# Patient Record
Sex: FEMALE | Race: WHITE | NOT HISPANIC OR LATINO | Employment: OTHER | URBAN - METROPOLITAN AREA
[De-identification: names, ages, dates, MRNs, and addresses within clinical notes are randomized per-mention and may not be internally consistent; named-entity substitution may affect disease eponyms.]

---

## 2017-01-20 DIAGNOSIS — I10 BENIGN ESSENTIAL HYPERTENSION: Primary | ICD-10-CM

## 2017-01-20 DIAGNOSIS — E78.1 HIGH TRIGLYCERIDES: ICD-10-CM

## 2017-01-20 RX ORDER — ROSUVASTATIN CALCIUM 10 MG/1
10 TABLET, COATED ORAL DAILY
Qty: 1 TABLET | Refills: 0 | Status: SHIPPED | OUTPATIENT
Start: 2017-01-20 | End: 2017-12-12 | Stop reason: DRUGHIGH

## 2017-01-20 RX ORDER — HYDROCHLOROTHIAZIDE 25 MG/1
25 TABLET ORAL EVERY MORNING
Qty: 1 TABLET | Refills: 0 | Status: SHIPPED | OUTPATIENT
Start: 2017-01-20 | End: 2017-10-16

## 2017-04-01 DIAGNOSIS — I10 BENIGN ESSENTIAL HYPERTENSION: ICD-10-CM

## 2017-04-03 RX ORDER — HYDROCHLOROTHIAZIDE 25 MG/1
TABLET ORAL
Qty: 90 TABLET | Refills: 1 | Status: SHIPPED | OUTPATIENT
Start: 2017-04-03 | End: 2017-10-13

## 2017-04-03 RX ORDER — LISINOPRIL 10 MG/1
TABLET ORAL
Qty: 90 TABLET | Refills: 1 | Status: SHIPPED | OUTPATIENT
Start: 2017-04-03 | End: 2017-10-13

## 2017-06-08 ENCOUNTER — OFFICE VISIT (OUTPATIENT)
Dept: FAMILY MEDICINE | Facility: CLINIC | Age: 67
End: 2017-06-08
Payer: MEDICARE

## 2017-06-08 VITALS
WEIGHT: 180 LBS | DIASTOLIC BLOOD PRESSURE: 61 MMHG | SYSTOLIC BLOOD PRESSURE: 115 MMHG | HEART RATE: 78 BPM | TEMPERATURE: 97 F | BODY MASS INDEX: 31.39 KG/M2 | OXYGEN SATURATION: 97 %

## 2017-06-08 DIAGNOSIS — E78.00 HIGH BLOOD CHOLESTEROL: ICD-10-CM

## 2017-06-08 DIAGNOSIS — I10 BENIGN ESSENTIAL HYPERTENSION: ICD-10-CM

## 2017-06-08 DIAGNOSIS — F33.0 MAJOR DEPRESSIVE DISORDER, RECURRENT EPISODE, MILD (H): ICD-10-CM

## 2017-06-08 DIAGNOSIS — F41.1 ANXIETY STATE: ICD-10-CM

## 2017-06-08 DIAGNOSIS — K92.1 BLOOD IN STOOL: Primary | ICD-10-CM

## 2017-06-08 DIAGNOSIS — E78.1 HIGH TRIGLYCERIDES: ICD-10-CM

## 2017-06-08 DIAGNOSIS — E03.9 HYPOTHYROIDISM, UNSPECIFIED TYPE: ICD-10-CM

## 2017-06-08 LAB
BASOPHILS # BLD AUTO: 0 10E9/L (ref 0–0.2)
BASOPHILS NFR BLD AUTO: 0.2 %
DIFFERENTIAL METHOD BLD: ABNORMAL
EOSINOPHIL # BLD AUTO: 0.2 10E9/L (ref 0–0.7)
EOSINOPHIL NFR BLD AUTO: 1.7 %
ERYTHROCYTE [DISTWIDTH] IN BLOOD BY AUTOMATED COUNT: 14.9 % (ref 10–15)
HCT VFR BLD AUTO: 40.2 % (ref 35–47)
HGB BLD-MCNC: 13.2 G/DL (ref 11.7–15.7)
LYMPHOCYTES # BLD AUTO: 2.8 10E9/L (ref 0.8–5.3)
LYMPHOCYTES NFR BLD AUTO: 20.7 %
MCH RBC QN AUTO: 27.7 PG (ref 26.5–33)
MCHC RBC AUTO-ENTMCNC: 32.8 G/DL (ref 31.5–36.5)
MCV RBC AUTO: 85 FL (ref 78–100)
MONOCYTES # BLD AUTO: 1.1 10E9/L (ref 0–1.3)
MONOCYTES NFR BLD AUTO: 8.2 %
NEUTROPHILS # BLD AUTO: 9.2 10E9/L (ref 1.6–8.3)
NEUTROPHILS NFR BLD AUTO: 69.2 %
PLATELET # BLD AUTO: 302 10E9/L (ref 150–450)
RBC # BLD AUTO: 4.76 10E12/L (ref 3.8–5.2)
WBC # BLD AUTO: 13.4 10E9/L (ref 4–11)

## 2017-06-08 PROCEDURE — 99213 OFFICE O/P EST LOW 20 MIN: CPT | Performed by: PHYSICIAN ASSISTANT

## 2017-06-08 PROCEDURE — 36415 COLL VENOUS BLD VENIPUNCTURE: CPT | Performed by: PHYSICIAN ASSISTANT

## 2017-06-08 PROCEDURE — 85025 COMPLETE CBC W/AUTO DIFF WBC: CPT | Performed by: PHYSICIAN ASSISTANT

## 2017-06-08 PROCEDURE — 80048 BASIC METABOLIC PNL TOTAL CA: CPT | Performed by: PHYSICIAN ASSISTANT

## 2017-06-08 RX ORDER — BRIMONIDINE TARTRATE, TIMOLOL MALEATE 2; 5 MG/ML; MG/ML
1 SOLUTION/ DROPS OPHTHALMIC 2 TIMES DAILY
COMMUNITY
Start: 2017-06-05 | End: 2018-07-19

## 2017-06-08 RX ORDER — LATANOPROST 50 UG/ML
1 SOLUTION/ DROPS OPHTHALMIC DAILY
COMMUNITY
Start: 2016-12-06 | End: 2017-12-12

## 2017-06-08 NOTE — Clinical Note
Please abstract the following data from this visit with this patient into the appropriate field in Epic:  Colonoscopy done on this date: 2015 (approximately), by this group: [facility], results were -fibroids were found, repeat in 5 years .

## 2017-06-08 NOTE — NURSING NOTE
"Chief Complaint   Patient presents with     Rectal Problem       Initial /61  Pulse 78  Temp 97  F (36.1  C) (Oral)  Wt 180 lb (81.6 kg)  SpO2 97%  BMI 31.39 kg/m2 Estimated body mass index is 31.39 kg/(m^2) as calculated from the following:    Height as of 7/8/15: 5' 3.5\" (1.613 m).    Weight as of this encounter: 180 lb (81.6 kg).  Medication Reconciliation: complete  Ave Monge CMA    "

## 2017-06-08 NOTE — PROGRESS NOTES
SUBJECTIVE:                                                    Molly Ospina is a 66 year old female who presents to clinic today for the following health issues:    Gastrointestinal symptoms      Duration: 2 -3 days    Description:           BLEEDING - bright red blood in the stool - running out of rectum per pt. Has happened with every bowel movement except for the last two    Intensity:  moderate    Accompanying signs and symptoms:  Light headed, abdominal cramps    History  Previous similar problem: no   Previous evaluation:  none    Aggravating factors: unsure    Alleviating factors: nothing    Other Therapies tried: None   Pt had colonoscopy at [facility] in 2015 - was found to have fibroids and repeat in 5 years  Was just in [place] visiting family. Diet changes while gone.     Hypertension Follow-up      Outpatient blood pressures are not being checked.    Low Salt Diet: no added salt        Problem list and histories reviewed & adjusted, as indicated.  Additional history: as documented    Patient Active Problem List   Diagnosis     High triglycerides     Anxiety state     Mild major depression (H)     CARDIOVASCULAR SCREENING; LDL GOAL LESS THAN 130     Hyperhidrosis of face     Advanced directives, counseling/discussion     Obesity     AK (actinic keratosis)     Hypothyroidism, unspecified type     Benign essential hypertension     High blood cholesterol     Major depressive disorder, recurrent episode, mild (H)     Past Surgical History:   Procedure Laterality Date     HYSTERECTOMY, SEGUNDO       LASIK       TONSILLECTOMY & ADENOIDECTOMY         Social History   Substance Use Topics     Smoking status: Former Smoker     Quit date: 1/1/1985     Smokeless tobacco: Former User     Alcohol use Yes      Comment: OCCASIONAL     Family History   Problem Relation Age of Onset     Breast Cancer Mother      HEART DISEASE Mother      Lipids Mother      OSTEOPOROSIS Mother      Hypertension Mother      C.A.D. Mother       CANCER Mother      melanoma on the leg     HEART DISEASE Father      Lipids Father      Thyroid Disease Father      Hypertension Father      C.A.D. Father      Cardiovascular Father      CANCER Father      arm skin cancer     Respiratory Father      pulmonary fibrosis - on O2     Breast Cancer Maternal Grandmother      Alzheimer Disease Maternal Grandmother      DIABETES Maternal Grandmother      HEART DISEASE Maternal Grandfather      Cancer - colorectal Paternal Grandmother      HEART DISEASE Paternal Grandfather      Lipids Son      Lipids Son      DIABETES Brother          Current Outpatient Prescriptions   Medication Sig Dispense Refill     latanoprost (XALATAN) 0.005 % ophthalmic solution Place 1 drop into both eyes daily        COMBIGAN 0.2-0.5 % ophthalmic solution Place 1 drop into both eyes 2 times daily        lisinopril (PRINIVIL/ZESTRIL) 10 MG tablet TAKE 1 TABLET DAILY 90 tablet 1     hydrochlorothiazide (HYDRODIURIL) 25 MG tablet TAKE 1 TABLET EVERY MORNING 90 tablet 1     levothyroxine (SYNTHROID/LEVOTHROID) 75 MCG tablet Take 1 tablet (75 mcg) by mouth daily 90 tablet 2     hydrochlorothiazide (HYDRODIURIL) 25 MG tablet Take 1 tablet (25 mg) by mouth every morning Disregard dispense one  You should have refill available from 10-13-16 1 tablet 0     rosuvastatin (CRESTOR) 10 MG tablet Take 1 tablet (10 mg) by mouth daily Disregard dispense one  You should have refill available from 10-13-16 1 tablet 0     metoprolol (TOPROL XL) 50 MG 24 hr tablet Take 1 tablet (50 mg) by mouth daily 90 tablet 1     venlafaxine (EFFEXOR XR) 75 MG 24 hr capsule Take 1 capsule (75 mg) by mouth daily 90 capsule 1     Multiple Vitamins-Minerals (MULTIVITAMIN PO)        acetaminophen (TYLENOL) 500 MG tablet Take 1-2 tablets by mouth every 6 hours as needed.       cycloSPORINE (RESTASIS) 0.05 % ophthalmic emulsion 1 drop every 12 hours.       aspirin 325 MG EC tablet Take 325 mg by mouth daily.       FISH OIL OR 1 tablet  daily       No Known Allergies  BP Readings from Last 3 Encounters:   06/08/17 115/61   10/13/16 114/59   12/18/15 136/62    Wt Readings from Last 3 Encounters:   06/08/17 180 lb (81.6 kg)   10/13/16 179 lb 12.8 oz (81.6 kg)   07/17/15 178 lb (80.7 kg)                  Labs reviewed in EPIC    ROS:  Constitutional, HEENT, cardiovascular, pulmonary, gi and gu systems are negative, except as otherwise noted.    OBJECTIVE:                                                    /61  Pulse 78  Temp 97  F (36.1  C) (Oral)  Wt 180 lb (81.6 kg)  SpO2 97%  BMI 31.39 kg/m2  Body mass index is 31.39 kg/(m^2).  GENERAL: healthy, alert and no distress  RESP: lungs clear to auscultation - no rales, rhonchi or wheezes  CV: regular rate and rhythm, normal S1 S2, no S3 or S4, no murmur, click or rub, no peripheral edema and peripheral pulses strong  ABDOMEN: soft, nontender, no hepatosplenomegaly, no masses and bowel sounds normal  RECTAL (female): deferred    Diagnostic Test Results:  none      ASSESSMENT/PLAN:                                                        ICD-10-CM    1. Blood in stool K92.1 CBC with platelets differential   2. Benign essential hypertension I10 Basic metabolic panel   3. Hypothyroidism, unspecified type E03.9    4. Major depressive disorder, recurrent episode, mild (H) F33.0    5. Anxiety state F41.1    6. High triglycerides E78.1    7. High blood cholesterol E78.00    1. Patient reassurance. Due to symptoms improving, may just wait and tx conservatively. warning signs discussed.  2. Labs pending. Recheck blood pressure in 6 months.     Gil Dumas PA-C  [facility]

## 2017-06-08 NOTE — MR AVS SNAPSHOT
After Visit Summary   6/8/2017    Molly Ospina    MRN: [id number]           Patient Information     Date Of Birth          1950        Visit Information        Provider Department      6/8/2017 5:30 PM Gil Dumas PA-C [facility]        Today's Diagnoses     Blood in stool    -  1    Benign essential hypertension        Hypothyroidism, unspecified type        Major depressive disorder, recurrent episode, mild (H)        Anxiety state        High triglycerides        High blood cholesterol           Follow-ups after your visit        Who to contact     If you have questions or need follow up information about today's clinic visit or your schedule please contact [facility] directly at [phone].  Normal or non-critical lab and imaging results will be communicated to you by MyChart, letter or phone within 4 business days after the clinic has received the results. If you do not hear from us within 7 days, please contact the clinic through [employer] or phone. If you have a critical or abnormal lab result, we will notify you by phone as soon as possible.  Submit refill requests through [employer] or call your pharmacy and they will forward the refill request to us. Please allow 3 business days for your refill to be completed.          Additional Information About Your Visit        MyChart Information     [employer] gives you secure access to your electronic health record. If you see a primary care provider, you can also send messages to your care team and make appointments. If you have questions, please call your primary care clinic.  If you do not have a primary care provider, please call [phone] and they will assist you.        Care EveryWhere ID     This is your Care EveryWhere ID. This could be used by other organizations to access your [facility] medical records  [id number]        Your Vitals Were     Pulse Temperature Pulse Oximetry BMI (Body Mass Index)           78 97  F (36.1  C) (Oral) 97% 31.39 kg/m2         Blood Pressure from Last 3 Encounters:   06/08/17 115/61   10/13/16 114/59   12/18/15 136/62    Weight from Last 3 Encounters:   06/08/17 180 lb (81.6 kg)   10/13/16 179 lb 12.8 oz (81.6 kg)   07/17/15 178 lb (80.7 kg)              We Performed the Following     Basic metabolic panel     CBC with platelets differential     DEPRESSION ACTION PLAN (DAP)        Primary Care Provider Office Phone # Fax #    Gil Dumas PA-C [phone]       [facility] [address] [facility] [address]        Thank you!     Thank you for choosing [facility]  for your care. Our goal is always to provide you with excellent care. Hearing back from our patients is one way we can continue to improve our services. Please take a few minutes to complete the written survey that you may receive in the mail after your visit with us. Thank you!             Your Updated Medication List - Protect others around you: Learn how to safely use, store and throw away your medicines at www.disposemymeds.org.          This list is accurate as of: 6/8/17  5:49 PM.  Always use your most recent med list.                   Brand Name Dispense Instructions for use    aspirin 325 MG EC tablet      Take 325 mg by mouth daily.       COMBIGAN 0.2-0.5 % ophthalmic solution   Generic drug:  brimonidine-timolol      Place 1 drop into both eyes 2 times daily       FISH OIL PO      1 tablet daily       * hydrochlorothiazide 25 MG tablet    HYDRODIURIL    1 tablet    Take 1 tablet (25 mg) by mouth every morning Disregard dispense one  You should have refill available from 10-13-16       * hydrochlorothiazide 25 MG tablet    HYDRODIURIL    90 tablet    TAKE 1 TABLET EVERY MORNING       latanoprost 0.005 % ophthalmic solution    XALATAN     Place 1 drop into both eyes daily       levothyroxine 75 MCG tablet    SYNTHROID/LEVOTHROID    90 tablet    Take 1 tablet (75  mcg) by mouth daily       lisinopril 10 MG tablet    PRINIVIL/ZESTRIL    90 tablet    TAKE 1 TABLET DAILY       metoprolol 50 MG 24 hr tablet    TOPROL XL    90 tablet    Take 1 tablet (50 mg) by mouth daily       MULTIVITAMIN PO          RESTASIS 0.05 % ophthalmic emulsion   Generic drug:  cycloSPORINE      1 drop every 12 hours.       rosuvastatin 10 MG tablet    CRESTOR    1 tablet    Take 1 tablet (10 mg) by mouth daily Disregard dispense one  You should have refill available from 10-13-16       TYLENOL 500 MG tablet   Generic drug:  acetaminophen      Take 1-2 tablets by mouth every 6 hours as needed.       venlafaxine 75 MG 24 hr capsule    EFFEXOR XR    90 capsule    Take 1 capsule (75 mg) by mouth daily       * Notice:  This list has 2 medication(s) that are the same as other medications prescribed for you. Read the directions carefully, and ask your doctor or other care provider to review them with you.

## 2017-06-08 NOTE — LETTER
[facility]  [address] [facility] [address] [phone]        June 9, 2017    Molly Ospina  [facility] [facility] [address]        Ms. Ospina,     Here is a copy of your labs. Lets recheck you blood pressure in 6 months.     Please contact the clinic if you have additional questions.  Thank you.     Sincerely,     Gil Dumas PA-Cks     Results for orders placed or performed in visit on 06/08/17   Basic metabolic panel   Result Value Ref Range    Sodium 133 133 - 144 mmol/L    Potassium 3.9 3.4 - 5.3 mmol/L    Chloride 96 94 - 109 mmol/L    Carbon Dioxide 29 20 - 32 mmol/L    Anion Gap 8 3 - 14 mmol/L    Glucose 111 (H) 70 - 99 mg/dL    Urea Nitrogen 12 7 - 30 mg/dL    Creatinine 0.74 0.52 - 1.04 mg/dL    GFR Estimate 79 >60 mL/min/1.7m2    GFR Estimate If Black >90   GFR Calc   >60 mL/min/1.7m2    Calcium 8.7 8.5 - 10.1 mg/dL   CBC with platelets differential   Result Value Ref Range    WBC 13.4 (H) 4.0 - 11.0 10e9/L    RBC Count 4.76 3.8 - 5.2 10e12/L    Hemoglobin 13.2 11.7 - 15.7 g/dL    Hematocrit 40.2 35.0 - 47.0 %    MCV 85 78 - 100 fl    MCH 27.7 26.5 - 33.0 pg    MCHC 32.8 31.5 - 36.5 g/dL    RDW 14.9 10.0 - 15.0 %    Platelet Count 302 150 - 450 10e9/L    Diff Method Automated Method     % Neutrophils 69.2 %    % Lymphocytes 20.7 %    % Monocytes 8.2 %    % Eosinophils 1.7 %    % Basophils 0.2 %    Absolute Neutrophil 9.2 (H) 1.6 - 8.3 10e9/L    Absolute Lymphocytes 2.8 0.8 - 5.3 10e9/L    Absolute Monocytes 1.1 0.0 - 1.3 10e9/L    Absolute Eosinophils 0.2 0.0 - 0.7 10e9/L    Absolute Basophils 0.0 0.0 - 0.2 10e9/L

## 2017-06-08 NOTE — LETTER
My Depression Action Plan  Name: Molly Ospina   Date of Birth 1950  Date: 6/8/2017    My doctor: Gil Dumas   My clinic: [facility]  [id number] [place] [address] [phone]          GREEN    ZONE   Good Control    What it looks like:     Things are going generally well. You have normal up s and down s. You may even feel depressed from time to time, but bad moods usually last less than a day.   What you need to do:  1. Continue to care for yourself (see self care plan)  2. Check your depression survival kit and update it as needed  3. Follow your physician s recommendations including any medication.  4. Do not stop taking medication unless you consult with your physician first.           YELLOW         ZONE Getting Worse    What it looks like:     Depression is starting to interfere with your life.     It may be hard to get out of bed; you may be starting to isolate yourself from others.    Symptoms of depression are starting to last most all day and this has happened for several days.     You may have suicidal thoughts but they are not constant.   What you need to do:     1. Call your care team, your response to treatment will improve if you keep your care team informed of your progress. Yellow periods are signs an adjustment may need to be made.     2. Continue your self-care, even if you have to fake it!    3. Talk to someone in your support network    4. Open up your depression survival kit           RED    ZONE Medical Alert - Get Help    What it looks like:     Depression is seriously interfering with your life.     You may experience these or other symptoms: You can t get out of bed most days, can t work or engage in other necessary activities, you have trouble taking care of basic hygiene, or basic responsibilities, thoughts of suicide or death that will not go away, self-injurious behavior.     What you need to do:  1. Call your care team and  request a same-day appointment. If they are not available (weekends or after hours) call your local crisis line, emergency room or 911.      Electronically signed by: Ave Perez, June 8, 2017    Depression Self Care Plan / Survival Kit    Self-Care for Depression  Here s the deal. Your body and mind are really not as separate as most people think.  What you do and think affects how you feel and how you feel influences what you do and think. This means if you do things that people who feel good do, it will help you feel better.  Sometimes this is all it takes.  There is also a place for medication and therapy depending on how severe your depression is, so be sure to consult with your medical provider and/ or Behavioral Health Consultant if your symptoms are worsening or not improving.     In order to better manage my stress, I will:    Exercise  Get some form of exercise, every day. This will help reduce pain and release endorphins, the  feel good  chemicals in your brain. This is almost as good as taking antidepressants!  This is not the same as joining a gym and then never going! (they count on that by the way ) It can be as simple as just going for a walk or doing some gardening, anything that will get you moving.      Hygiene   Maintain good hygiene (Get out of bed in the morning, Make your bed, Brush your teeth, Take a shower, and Get dressed like you were going to work, even if you are unemployed).  If your clothes don't fit try to get ones that do.    Diet  I will strive to eat foods that are good for me, drink plenty of water, and avoid excessive sugar, caffeine, alcohol, and other mood-altering substances.  Some foods that are helpful in depression are: complex carbohydrates, B vitamins, flaxseed, fish or fish oil, fresh fruits and vegetables.    Psychotherapy  I agree to participate in Individual Therapy (if recommended).    Medication  If prescribed medications, I agree to take them.  Missing doses  can result in serious side effects.  I understand that drinking alcohol, or other illicit drug use, may cause potential side effects.  I will not stop my medication abruptly without first discussing it with my provider.    Staying Connected With Others  I will stay in touch with my friends, family members, and my primary care provider/team.    Use your imagination  Be creative.  We all have a creative side; it doesn t matter if it s oil painting, sand castles, or mud pies! This will also kick up the endorphins.    Witness Beauty  (AKA stop and smell the roses) Take a look outside, even in mid-winter. Notice colors, textures. Watch the squirrels and birds.     Service to others  Be of service to others.  There is always someone else in need.  By helping others we can  get out of ourselves  and remember the really important things.  This also provides opportunities for practicing all the other parts of the program.    Humor  Laugh and be silly!  Adjust your TV habits for less news and crime-drama and more comedy.    Control your stress  Try breathing deep, massage therapy, biofeedback, and meditation. Find time to relax each day.     My support system    Clinic Contact:  Phone number:    Contact 1:  Phone number:    Contact 2:  Phone number:    Congregational/:  Phone number:    Therapist:  Phone number:    Local crisis center:    Phone number:    Other community support:  Phone number:

## 2017-06-09 ENCOUNTER — MYC MEDICAL ADVICE (OUTPATIENT)
Dept: FAMILY MEDICINE | Facility: CLINIC | Age: 67
End: 2017-06-09

## 2017-06-09 LAB
ANION GAP SERPL CALCULATED.3IONS-SCNC: 8 MMOL/L (ref 3–14)
BUN SERPL-MCNC: 12 MG/DL (ref 7–30)
CALCIUM SERPL-MCNC: 8.7 MG/DL (ref 8.5–10.1)
CHLORIDE SERPL-SCNC: 96 MMOL/L (ref 94–109)
CO2 SERPL-SCNC: 29 MMOL/L (ref 20–32)
CREAT SERPL-MCNC: 0.74 MG/DL (ref 0.52–1.04)
GFR SERPL CREATININE-BSD FRML MDRD: 79 ML/MIN/1.7M2
GLUCOSE SERPL-MCNC: 111 MG/DL (ref 70–99)
POTASSIUM SERPL-SCNC: 3.9 MMOL/L (ref 3.4–5.3)
SODIUM SERPL-SCNC: 133 MMOL/L (ref 133–144)

## 2017-06-09 ASSESSMENT — PATIENT HEALTH QUESTIONNAIRE - PHQ9: SUM OF ALL RESPONSES TO PHQ QUESTIONS 1-9: 5

## 2017-07-26 DIAGNOSIS — F33.0 MAJOR DEPRESSIVE DISORDER, RECURRENT EPISODE, MILD (H): ICD-10-CM

## 2017-07-26 DIAGNOSIS — I10 BENIGN ESSENTIAL HYPERTENSION: ICD-10-CM

## 2017-07-28 RX ORDER — METOPROLOL SUCCINATE 50 MG/1
TABLET, EXTENDED RELEASE ORAL
Qty: 90 TABLET | Refills: 1 | Status: SHIPPED | OUTPATIENT
Start: 2017-07-28 | End: 2017-12-12 | Stop reason: DRUGHIGH

## 2017-07-28 RX ORDER — VENLAFAXINE HYDROCHLORIDE 75 MG/1
CAPSULE, EXTENDED RELEASE ORAL
Qty: 90 CAPSULE | Refills: 1 | Status: SHIPPED | OUTPATIENT
Start: 2017-07-28 | End: 2017-12-12 | Stop reason: DRUGHIGH

## 2017-11-28 ENCOUNTER — MYC MEDICAL ADVICE (OUTPATIENT)
Dept: FAMILY MEDICINE | Facility: CLINIC | Age: 67
End: 2017-11-28

## 2017-11-28 DIAGNOSIS — I10 BENIGN ESSENTIAL HYPERTENSION: ICD-10-CM

## 2017-11-28 DIAGNOSIS — E03.9 HYPOTHYROIDISM, UNSPECIFIED TYPE: Primary | ICD-10-CM

## 2017-11-28 DIAGNOSIS — Z13.6 CARDIOVASCULAR SCREENING; LDL GOAL LESS THAN 130: ICD-10-CM

## 2017-11-30 NOTE — TELEPHONE ENCOUNTER
:;  Needs to be seen for depression, cholesterol, and thyroid.  Fasting labwork.  Amisha García RN

## 2017-11-30 NOTE — TELEPHONE ENCOUNTER
Need previsit labs-See pending orders and close encounter./Lucero Mendoza,         BP/Chol/Thyroid/Depression 12/12/17

## 2017-12-05 DIAGNOSIS — Z13.6 CARDIOVASCULAR SCREENING; LDL GOAL LESS THAN 130: ICD-10-CM

## 2017-12-05 DIAGNOSIS — I10 BENIGN ESSENTIAL HYPERTENSION: ICD-10-CM

## 2017-12-05 DIAGNOSIS — E03.9 HYPOTHYROIDISM, UNSPECIFIED TYPE: ICD-10-CM

## 2017-12-05 LAB
ANION GAP SERPL CALCULATED.3IONS-SCNC: 5 MMOL/L (ref 3–14)
BUN SERPL-MCNC: 14 MG/DL (ref 7–30)
CALCIUM SERPL-MCNC: 8.8 MG/DL (ref 8.5–10.1)
CHLORIDE SERPL-SCNC: 104 MMOL/L (ref 94–109)
CHOLEST SERPL-MCNC: 147 MG/DL
CO2 SERPL-SCNC: 29 MMOL/L (ref 20–32)
CREAT SERPL-MCNC: 0.7 MG/DL (ref 0.52–1.04)
GFR SERPL CREATININE-BSD FRML MDRD: 83 ML/MIN/1.7M2
GLUCOSE SERPL-MCNC: 109 MG/DL (ref 70–99)
HDLC SERPL-MCNC: 52 MG/DL
LDLC SERPL CALC-MCNC: 65 MG/DL
NONHDLC SERPL-MCNC: 95 MG/DL
POTASSIUM SERPL-SCNC: 4.1 MMOL/L (ref 3.4–5.3)
SODIUM SERPL-SCNC: 138 MMOL/L (ref 133–144)
TRIGL SERPL-MCNC: 151 MG/DL
TSH SERPL DL<=0.005 MIU/L-ACNC: 2.52 MU/L (ref 0.4–4)

## 2017-12-05 PROCEDURE — 84443 ASSAY THYROID STIM HORMONE: CPT | Performed by: PHYSICIAN ASSISTANT

## 2017-12-05 PROCEDURE — 80048 BASIC METABOLIC PNL TOTAL CA: CPT | Performed by: PHYSICIAN ASSISTANT

## 2017-12-05 PROCEDURE — 36415 COLL VENOUS BLD VENIPUNCTURE: CPT | Performed by: PHYSICIAN ASSISTANT

## 2017-12-05 PROCEDURE — 80061 LIPID PANEL: CPT | Performed by: PHYSICIAN ASSISTANT

## 2017-12-12 ENCOUNTER — OFFICE VISIT (OUTPATIENT)
Dept: FAMILY MEDICINE | Facility: CLINIC | Age: 67
End: 2017-12-12
Payer: MEDICARE

## 2017-12-12 VITALS
OXYGEN SATURATION: 98 % | SYSTOLIC BLOOD PRESSURE: 120 MMHG | HEART RATE: 72 BPM | BODY MASS INDEX: 31.56 KG/M2 | WEIGHT: 181 LBS | DIASTOLIC BLOOD PRESSURE: 60 MMHG

## 2017-12-12 DIAGNOSIS — E03.9 HYPOTHYROIDISM, UNSPECIFIED TYPE: ICD-10-CM

## 2017-12-12 DIAGNOSIS — E78.1 HIGH TRIGLYCERIDES: ICD-10-CM

## 2017-12-12 DIAGNOSIS — I10 BENIGN ESSENTIAL HYPERTENSION: Primary | ICD-10-CM

## 2017-12-12 DIAGNOSIS — F33.0 MAJOR DEPRESSIVE DISORDER, RECURRENT EPISODE, MILD (H): ICD-10-CM

## 2017-12-12 DIAGNOSIS — L82.1 SEBORRHEIC KERATOSES: ICD-10-CM

## 2017-12-12 PROCEDURE — 99214 OFFICE O/P EST MOD 30 MIN: CPT | Mod: 25 | Performed by: PHYSICIAN ASSISTANT

## 2017-12-12 PROCEDURE — 17000 DESTRUCT PREMALG LESION: CPT | Performed by: PHYSICIAN ASSISTANT

## 2017-12-12 RX ORDER — METOPROLOL SUCCINATE 50 MG/1
50 TABLET, EXTENDED RELEASE ORAL DAILY
Qty: 90 TABLET | Refills: 1 | Status: SHIPPED | OUTPATIENT
Start: 2017-12-12 | End: 2018-07-19

## 2017-12-12 RX ORDER — LISINOPRIL 10 MG/1
10 TABLET ORAL DAILY
Qty: 90 TABLET | Refills: 1 | Status: SHIPPED | OUTPATIENT
Start: 2017-12-12 | End: 2018-07-19

## 2017-12-12 RX ORDER — LEVOTHYROXINE SODIUM 75 UG/1
75 TABLET ORAL DAILY
Qty: 90 TABLET | Refills: 1 | Status: SHIPPED | OUTPATIENT
Start: 2017-12-12 | End: 2018-07-19

## 2017-12-12 RX ORDER — VENLAFAXINE HYDROCHLORIDE 37.5 MG/1
37.5 CAPSULE, EXTENDED RELEASE ORAL DAILY
Qty: 90 CAPSULE | Refills: 1 | Status: SHIPPED | OUTPATIENT
Start: 2017-12-12 | End: 2018-07-19

## 2017-12-12 RX ORDER — HYDROCHLOROTHIAZIDE 25 MG/1
TABLET ORAL
Qty: 90 TABLET | Refills: 1 | Status: SHIPPED | OUTPATIENT
Start: 2017-12-12 | End: 2018-07-19

## 2017-12-12 RX ORDER — ROSUVASTATIN CALCIUM 10 MG/1
10 TABLET, COATED ORAL DAILY
Qty: 90 TABLET | Refills: 3 | Status: SHIPPED | OUTPATIENT
Start: 2017-12-12 | End: 2019-01-31

## 2017-12-12 ASSESSMENT — PATIENT HEALTH QUESTIONNAIRE - PHQ9: SUM OF ALL RESPONSES TO PHQ QUESTIONS 1-9: 5

## 2017-12-12 NOTE — NURSING NOTE
"Chief Complaint   Patient presents with     Hypertension     Lipids     Depression     Anxiety     Thyroid Problem       Initial /60  Pulse 72  Wt 181 lb (82.1 kg)  SpO2 98%  BMI 31.56 kg/m2 Estimated body mass index is 31.56 kg/(m^2) as calculated from the following:    Height as of 7/8/15: 5' 3.5\" (1.613 m).    Weight as of this encounter: 181 lb (82.1 kg).  Medication Reconciliation: complete  Ave Monge CMA    "

## 2017-12-12 NOTE — PROGRESS NOTES
SUBJECTIVE:                                                    Molly Ospina is a 67 year old female who presents to clinic today for the following health issues:    Hyperlipidemia Follow-Up      Rate your low fat/cholesterol diet?: not monitoring fat    Taking statin?  Yes, no muscle aches from statin    Other lipid medications/supplements?:  none    Hypertension Follow-up      Outpatient blood pressures are being checked at home.  Results are normal/slightly high.    Low Salt Diet: not monitoring salt    Depression and Anxiety Follow-Up    Status since last visit: No change    Other associated symptoms:None    Complicating factors:     Significant life event: No     Current substance abuse: Alcohol  Has tried to get off effexor due to weight gain but was have side effects.   Is interested in getting off meds.     PHQ-9 Score and MyChart F/U Questions 10/13/2016 6/8/2017 12/12/2017   Total Score 4 5 5   Q9: Suicide Ideation Several days Several days Not at all     KRYSTYNA-7 SCORE 3/26/2013 10/13/2016   Total Score 6 -   Total Score 6 -   Total Score - 8       PHQ-9  English  PHQ-9   Any Language  GAD7  Suicide Assessment Five-step Evaluation and Treatment (SAFE-T)  Hypothyroidism Follow-up      Since last visit, patient describes the following symptoms: Weight stable, no hair loss, no skin changes, no constipation, no loose stools      Amount of exercise or physical activity: occasionally     Problems taking medications regularly: No    Medication side effects: none  Diet: regular (no restrictions)      Problem list and histories reviewed & adjusted, as indicated.  Additional history: as documented    Patient Active Problem List   Diagnosis     High triglycerides     Anxiety state     Mild major depression (H)     CARDIOVASCULAR SCREENING; LDL GOAL LESS THAN 130     Hyperhidrosis of face     Advanced directives, counseling/discussion     Obesity     AK (actinic keratosis)     Hypothyroidism, unspecified type      Benign essential hypertension     High blood cholesterol     Major depressive disorder, recurrent episode, mild (H)     Seborrheic keratoses     Past Surgical History:   Procedure Laterality Date     HYSTERECTOMY, SEGUNDO       LASIK       TONSILLECTOMY & ADENOIDECTOMY         Social History   Substance Use Topics     Smoking status: Former Smoker     Quit date: 1/1/1985     Smokeless tobacco: Former User     Alcohol use Yes      Comment: OCCASIONAL     Family History   Problem Relation Age of Onset     Breast Cancer Mother      HEART DISEASE Mother      Lipids Mother      OSTEOPOROSIS Mother      Hypertension Mother      C.A.D. Mother      CANCER Mother      melanoma on the leg     HEART DISEASE Father      Lipids Father      Thyroid Disease Father      Hypertension Father      C.A.D. Father      Cardiovascular Father      CANCER Father      arm skin cancer     Respiratory Father      pulmonary fibrosis - on O2     Breast Cancer Maternal Grandmother      Alzheimer Disease Maternal Grandmother      DIABETES Maternal Grandmother      HEART DISEASE Maternal Grandfather      Cancer - colorectal Paternal Grandmother      HEART DISEASE Paternal Grandfather      Lipids Son      Lipids Son      DIABETES Brother          Current Outpatient Prescriptions   Medication Sig Dispense Refill     NIACIN PO        lisinopril (PRINIVIL/ZESTRIL) 10 MG tablet Take 1 tablet (10 mg) by mouth daily 90 tablet 1     hydrochlorothiazide (HYDRODIURIL) 25 MG tablet TAKE 1 TABLET EVERY MORNING 90 tablet 1     levothyroxine (SYNTHROID/LEVOTHROID) 75 MCG tablet Take 1 tablet (75 mcg) by mouth daily 90 tablet 1     metoprolol (TOPROL-XL) 50 MG 24 hr tablet Take 1 tablet (50 mg) by mouth daily 90 tablet 1     rosuvastatin (CRESTOR) 10 MG tablet Take 1 tablet (10 mg) by mouth daily 90 tablet 3     venlafaxine (EFFEXOR-XR) 37.5 MG 24 hr capsule Take 1 capsule (37.5 mg) by mouth daily 90 capsule 1     COMBIGAN 0.2-0.5 % ophthalmic solution Place 1 drop  into both eyes 2 times daily        Multiple Vitamins-Minerals (MULTIVITAMIN PO)        acetaminophen (TYLENOL) 500 MG tablet Take 1-2 tablets by mouth every 6 hours as needed.       aspirin 325 MG EC tablet Take 325 mg by mouth daily.       FISH OIL OR 1 tablet daily       [DISCONTINUED] lisinopril (PRINIVIL/ZESTRIL) 10 MG tablet TAKE 1 TABLET DAILY 90 tablet 0     [DISCONTINUED] hydrochlorothiazide (HYDRODIURIL) 25 MG tablet TAKE 1 TABLET EVERY MORNING 90 tablet 0     [DISCONTINUED] rosuvastatin (CRESTOR) 10 MG tablet TAKE 1 TABLET DAILY 90 tablet 0     [DISCONTINUED] levothyroxine (SYNTHROID/LEVOTHROID) 75 MCG tablet TAKE 1 TABLET DAILY 90 tablet 0     [DISCONTINUED] metoprolol (TOPROL-XL) 50 MG 24 hr tablet TAKE 1 TABLET DAILY 90 tablet 1     [DISCONTINUED] venlafaxine (EFFEXOR-XR) 75 MG 24 hr capsule TAKE 1 CAPSULE DAILY 90 capsule 1     [DISCONTINUED] rosuvastatin (CRESTOR) 10 MG tablet Take 1 tablet (10 mg) by mouth daily Disregard dispense one  You should have refill available from 10-13-16 1 tablet 0     No Known Allergies  BP Readings from Last 3 Encounters:   12/12/17 120/60   06/08/17 115/61   10/13/16 114/59    Wt Readings from Last 3 Encounters:   12/12/17 181 lb (82.1 kg)   06/08/17 180 lb (81.6 kg)   10/13/16 179 lb 12.8 oz (81.6 kg)                  Labs reviewed in EPIC        ROS:  Constitutional, HEENT, cardiovascular, pulmonary, gi and gu systems are negative, except as otherwise noted.      OBJECTIVE:   /60  Pulse 72  Wt 181 lb (82.1 kg)  SpO2 98%  BMI 31.56 kg/m2  Body mass index is 31.56 kg/(m^2).  GENERAL: healthy, alert and no distress  EYES: Eyes grossly normal to inspection, PERRL and conjunctivae and sclerae normal  HENT: ear canals and TM's normal, nose and mouth without ulcers or lesions  NECK: no adenopathy, no asymmetry, masses, or scars and thyroid normal to palpation  RESP: lungs clear to auscultation - no rales, rhonchi or wheezes  CV: regular rate and rhythm, normal S1  S2, no S3 or S4, no murmur, click or rub, no peripheral edema and peripheral pulses strong  ABDOMEN: soft, nontender, no hepatosplenomegaly, no masses and bowel sounds normal  MS: no gross musculoskeletal defects noted, no edema  SKIN: SK on back x 8  NEURO: Normal strength and tone, mentation intact and speech normal  PSYCH: mentation appears normal, affect normal/bright    Diagnostic Test Results:  Results for orders placed or performed in visit on 12/05/17   TSH with free T4 reflex   Result Value Ref Range    TSH 2.52 0.40 - 4.00 mU/L   Lipid panel reflex to direct LDL Fasting   Result Value Ref Range    Cholesterol 147 <200 mg/dL    Triglycerides 151 (H) <150 mg/dL    HDL Cholesterol 52 >49 mg/dL    LDL Cholesterol Calculated 65 <100 mg/dL    Non HDL Cholesterol 95 <130 mg/dL   **Basic metabolic panel FUTURE anytime   Result Value Ref Range    Sodium 138 133 - 144 mmol/L    Potassium 4.1 3.4 - 5.3 mmol/L    Chloride 104 94 - 109 mmol/L    Carbon Dioxide 29 20 - 32 mmol/L    Anion Gap 5 3 - 14 mmol/L    Glucose 109 (H) 70 - 99 mg/dL    Urea Nitrogen 14 7 - 30 mg/dL    Creatinine 0.70 0.52 - 1.04 mg/dL    GFR Estimate 83 >60 mL/min/1.7m2    GFR Estimate If Black >90 >60 mL/min/1.7m2    Calcium 8.8 8.5 - 10.1 mg/dL       ASSESSMENT/PLAN:         ICD-10-CM    1. Benign essential hypertension I10 lisinopril (PRINIVIL/ZESTRIL) 10 MG tablet     hydrochlorothiazide (HYDRODIURIL) 25 MG tablet     metoprolol (TOPROL-XL) 50 MG 24 hr tablet   2. Hypothyroidism, unspecified type E03.9 levothyroxine (SYNTHROID/LEVOTHROID) 75 MCG tablet   3. Major depressive disorder, recurrent episode, mild (H) F33.0 venlafaxine (EFFEXOR-XR) 37.5 MG 24 hr capsule   4. High triglycerides E78.1 rosuvastatin (CRESTOR) 10 MG tablet   5. Seborrheic keratoses L82.1 DERMATOLOGY REFERRAL     DESTRUCT BENIGN LESION, UP TO 14   1-2 meds refilled. Work on Healthy diet and exercise. Getting heart rate elevated for 30 mins most days of week.  3. Decrease  effexor to 37.5mg daily re-eval in 6 months.   5. Use of liquid nitrogen was discussed. warning signs discussed. side effects discussed. It was used on skin lesion x 3. Recheck 4 wks as needed     Gil Dumas PA-C  [facility]

## 2017-12-12 NOTE — MR AVS SNAPSHOT
After Visit Summary   12/12/2017    Molly Ospina    MRN: [id number]           Patient Information     Date Of Birth          1950        Visit Information        Provider Department      12/12/2017 8:40 AM Gil Dumas PA-C [facility]        Today's Diagnoses     Benign essential hypertension    -  1    Hypothyroidism, unspecified type        Major depressive disorder, recurrent episode, mild (H)        High triglycerides        Seborrheic keratoses           Follow-ups after your visit        Additional Services     DERMATOLOGY REFERRAL       Your provider has referred you to: [facility]: [facility] [phone]   [email or web address]  [facility] [phone]   [email or web address]    Please be aware that coverage of these services is subject to the terms and limitations of your health insurance plan.  Call member services at your health plan with any benefit or coverage questions.      Please bring the following with you to your appointment:    (1) Any X-Rays, CTs or MRIs which have been performed.  Contact the facility where they were done to arrange for  prior to your scheduled appointment.    (2) List of current medications  (3) This referral request   (4) Any documents/labs given to you for this referral                  Who to contact     If you have questions or need follow up information about today's clinic visit or your schedule please contact [facility] directly at [phone].  Normal or non-critical lab and imaging results will be communicated to you by MyChart, letter or phone within 4 business days after the clinic has received the results. If you do not hear from us within 7 days, please contact the clinic through MyChart or phone. If you have a critical or abnormal lab result, we will notify you by phone  as soon as possible.  Submit refill requests through [employer] or call your pharmacy and they will forward the refill request to us. Please allow 3 business days for your refill to be completed.          Additional Information About Your Visit        [employer] Information     [employer] gives you secure access to your electronic health record. If you see a primary care provider, you can also send messages to your care team and make appointments. If you have questions, please call your primary care clinic.  If you do not have a primary care provider, please call [phone] and they will assist you.        Care EveryWhere ID     This is your Care EveryWhere ID. This could be used by other organizations to access your [facility] medical records  [id number]        Your Vitals Were     Pulse Pulse Oximetry BMI (Body Mass Index)             72 98% 31.56 kg/m2          Blood Pressure from Last 3 Encounters:   12/12/17 120/60   06/08/17 115/61   10/13/16 114/59    Weight from Last 3 Encounters:   12/12/17 181 lb (82.1 kg)   06/08/17 180 lb (81.6 kg)   10/13/16 179 lb 12.8 oz (81.6 kg)              We Performed the Following     DERMATOLOGY REFERRAL          Today's Medication Changes          These changes are accurate as of: 12/12/17  9:15 AM.  If you have any questions, ask your nurse or doctor.               These medicines have changed or have updated prescriptions.        Dose/Directions    hydrochlorothiazide 25 MG tablet   Commonly known as:  HYDRODIURIL   This may have changed:  See the new instructions.   Used for:  Benign essential hypertension   Changed by:  Gil Dumas PA-C        TAKE 1 TABLET EVERY MORNING   Quantity:  90 tablet   Refills:  1       levothyroxine 75 MCG tablet   Commonly known as:  SYNTHROID/LEVOTHROID   This may have changed:  See the new instructions.   Used for:  Hypothyroidism, unspecified type   Changed by:  Gil Dumas PA-C        Dose:  75 mcg   Take 1 tablet (75 mcg) by  mouth daily   Quantity:  90 tablet   Refills:  1       lisinopril 10 MG tablet   Commonly known as:  PRINIVIL/ZESTRIL   This may have changed:  See the new instructions.   Used for:  Benign essential hypertension   Changed by:  Gil Dumas PA-C        Dose:  10 mg   Take 1 tablet (10 mg) by mouth daily   Quantity:  90 tablet   Refills:  1       metoprolol 50 MG 24 hr tablet   Commonly known as:  TOPROL-XL   This may have changed:  See the new instructions.   Used for:  Benign essential hypertension   Changed by:  Gil Dumas PA-C        Dose:  50 mg   Take 1 tablet (50 mg) by mouth daily   Quantity:  90 tablet   Refills:  1       rosuvastatin 10 MG tablet   Commonly known as:  CRESTOR   This may have changed:  additional instructions   Used for:  High triglycerides   Changed by:  Gil Dumas PA-C        Dose:  10 mg   Take 1 tablet (10 mg) by mouth daily   Quantity:  90 tablet   Refills:  3       venlafaxine 37.5 MG 24 hr capsule   Commonly known as:  EFFEXOR-XR   This may have changed:  See the new instructions.   Used for:  Major depressive disorder, recurrent episode, mild (H)   Changed by:  Gil Dumas PA-C        Dose:  37.5 mg   Take 1 capsule (37.5 mg) by mouth daily   Quantity:  90 capsule   Refills:  1            Where to get your medicines      These medications were sent to [facility] - [address] [address]     Phone:  [phone]     hydrochlorothiazide 25 MG tablet    levothyroxine 75 MCG tablet    lisinopril 10 MG tablet    metoprolol 50 MG 24 hr tablet    rosuvastatin 10 MG tablet    venlafaxine 37.5 MG 24 hr capsule                Primary Care Provider Office Phone # Fax #    Gil Dumas PA-C [phone] [address]  [facility] [address]        Equal Access to Services     RASHID MONROY AH: Kee Bernstein, shonna best, qaybta  reymundo amaral merrytj edward villegas ah. Cat [facility] [phone].    ATENCIÓN: Si pasha waldrop, tiene a ruelas disposición servicios gratuitos de asistencia lingüística. Flo al [phone].    We comply with applicable federal civil rights laws and [place] laws. We do not discriminate on the basis of race, color, national origin, age, disability, sex, sexual orientation, or gender identity.            Thank you!     Thank you for choosing [facility]  [facility] your [facility]. Our goal is always to provide you with excellent care. Hearing back from our patients is one way we can continue to improve our services. Please take a few minutes to complete the written survey that you may receive in the mail after your visit with us. Thank you!             Your Updated Medication List - Protect others around you: Learn how to safely use, store and throw away your medicines at www.disposemymeds.org.          This list is accurate as of: 12/12/17  9:15 AM.  Always use your most recent med list.                   Brand Name Dispense Instructions for use Diagnosis    aspirin 325 MG EC tablet      Take 325 mg by mouth daily.        COMBIGAN 0.2-0.5 % ophthalmic solution   Generic drug:  brimonidine-timolol      Place 1 drop into both eyes 2 times daily        FISH OIL PO      1 tablet daily    HTN (hypertension)       hydrochlorothiazide 25 MG tablet    HYDRODIURIL    90 tablet    TAKE 1 TABLET EVERY MORNING    Benign essential hypertension       levothyroxine 75 MCG tablet    SYNTHROID/LEVOTHROID    90 tablet    Take 1 tablet (75 mcg) by mouth daily    Hypothyroidism, unspecified type       lisinopril 10 MG tablet    PRINIVIL/ZESTRIL    90 tablet    Take 1 tablet (10 mg) by mouth daily    Benign essential hypertension       metoprolol 50 MG 24 hr tablet    TOPROL-XL    90 tablet    Take 1 tablet (50 mg) by mouth daily    Benign essential hypertension       MULTIVITAMIN PO           NIACIN PO            rosuvastatin 10 MG tablet    CRESTOR    90 tablet    Take 1 tablet (10 mg) by mouth daily    High triglycerides       TYLENOL 500 MG tablet   Generic drug:  acetaminophen      Take 1-2 tablets by mouth every 6 hours as needed.        venlafaxine 37.5 MG 24 hr capsule    EFFEXOR-XR    90 capsule    Take 1 capsule (37.5 mg) by mouth daily    Major depressive disorder, recurrent episode, mild (H)

## 2018-01-16 NOTE — PROGRESS NOTES
SUBJECTIVE:                                                    Molly Ospina is a 67 year old female who presents to clinic today for the following health issues:    Depression and Anxiety Follow-Up    Status since last visit: Worsened, recently lowered Venlafaxine - thinks needs to go back to higher dose    Other associated symptoms:hard time staying asleep     Complicating factors:     Significant life event: Yes- mom moving into assistant living, lost her dog a few months ago      Current substance abuse:  Prescription Drugs    Pt requesting to meet with Catrina   PHQ-9 Score and Marco Antoniot F/U Questions 10/13/2016 6/8/2017 12/12/2017   Total Score 4 5 5   Q9: Suicide Ideation Several days Several days Not at all     KRYSTYNA-7 SCORE 3/26/2013 10/13/2016   Total Score 6 -   Total Score 6 -   Total Score - 8     No thoughts of hurting self or others.     PHQ-9  English  PHQ-9   Any Language  GAD7  Suicide Assessment Five-step Evaluation and Treatment (SAFE-T)      Amount of exercise or physical activity: None    Problems taking medications regularly: No    Medication side effects: none    Diet: regular (no restrictions)        Problem list and histories reviewed & adjusted, as indicated.  Additional history: as documented      Patient Active Problem List   Diagnosis     High triglycerides     Anxiety state     Mild major depression (H)     CARDIOVASCULAR SCREENING; LDL GOAL LESS THAN 130     Hyperhidrosis of face     Advanced directives, counseling/discussion     Obesity     AK (actinic keratosis)     Hypothyroidism, unspecified type     Benign essential hypertension     High blood cholesterol     Major depressive disorder, recurrent episode, mild (H)     Seborrheic keratoses     Past Surgical History:   Procedure Laterality Date     HYSTERECTOMY, SEGUNDO       LASIK       TONSILLECTOMY & ADENOIDECTOMY         Social History   Substance Use Topics     Smoking status: Former Smoker     Quit date: 1/1/1985     Smokeless  tobacco: Former User     Alcohol use Yes      Comment: OCCASIONAL     Family History   Problem Relation Age of Onset     Breast Cancer Mother      HEART DISEASE Mother      Lipids Mother      OSTEOPOROSIS Mother      Hypertension Mother      C.A.D. Mother      CANCER Mother      melanoma on the leg     HEART DISEASE Father      Lipids Father      Thyroid Disease Father      Hypertension Father      C.A.D. Father      Cardiovascular Father      CANCER Father      arm skin cancer     Respiratory Father      pulmonary fibrosis - on O2     Breast Cancer Maternal Grandmother      Alzheimer Disease Maternal Grandmother      DIABETES Maternal Grandmother      HEART DISEASE Maternal Grandfather      Cancer - colorectal Paternal Grandmother      HEART DISEASE Paternal Grandfather      Lipids Son      Lipids Son      DIABETES Brother          Current Outpatient Prescriptions   Medication Sig Dispense Refill     NIACIN PO        lisinopril (PRINIVIL/ZESTRIL) 10 MG tablet Take 1 tablet (10 mg) by mouth daily 90 tablet 1     hydrochlorothiazide (HYDRODIURIL) 25 MG tablet TAKE 1 TABLET EVERY MORNING 90 tablet 1     levothyroxine (SYNTHROID/LEVOTHROID) 75 MCG tablet Take 1 tablet (75 mcg) by mouth daily 90 tablet 1     metoprolol (TOPROL-XL) 50 MG 24 hr tablet Take 1 tablet (50 mg) by mouth daily 90 tablet 1     rosuvastatin (CRESTOR) 10 MG tablet Take 1 tablet (10 mg) by mouth daily 90 tablet 3     venlafaxine (EFFEXOR-XR) 37.5 MG 24 hr capsule Take 1 capsule (37.5 mg) by mouth daily 90 capsule 1     COMBIGAN 0.2-0.5 % ophthalmic solution Place 1 drop into both eyes 2 times daily        Multiple Vitamins-Minerals (MULTIVITAMIN PO)        acetaminophen (TYLENOL) 500 MG tablet Take 1-2 tablets by mouth every 6 hours as needed.       aspirin 325 MG EC tablet Take 325 mg by mouth daily.       FISH OIL OR 1 tablet daily       No Known Allergies      ROS:  Constitutional, HEENT, cardiovascular, pulmonary, gi and gu systems are  negative, except as otherwise noted.      OBJECTIVE:   /66  Pulse 82  Wt 182 lb (82.6 kg)  SpO2 98%  BMI 31.73 kg/m2  Body mass index is 31.73 kg/(m^2).  GENERAL: healthy, alert and no distress  PSYCH: mentation appears normal, affect normal/bright    Diagnostic Test Results:  none     ASSESSMENT/PLAN:         ICD-10-CM    1. Anxiety state F41.1    2. Mild major depression (H) F32.0        Warm clinic hand off to Catrina ([facility])   warning signs discussed.  Recheck 1-2 wks as needed     Gil Dumas PA-C  [facility]

## 2018-01-17 ENCOUNTER — OFFICE VISIT (OUTPATIENT)
Dept: BEHAVIORAL HEALTH | Facility: CLINIC | Age: 68
End: 2018-01-17
Payer: MEDICARE

## 2018-01-17 ENCOUNTER — OFFICE VISIT (OUTPATIENT)
Dept: FAMILY MEDICINE | Facility: CLINIC | Age: 68
End: 2018-01-17
Payer: MEDICARE

## 2018-01-17 VITALS
DIASTOLIC BLOOD PRESSURE: 66 MMHG | WEIGHT: 182 LBS | SYSTOLIC BLOOD PRESSURE: 129 MMHG | OXYGEN SATURATION: 98 % | HEART RATE: 82 BPM | BODY MASS INDEX: 31.73 KG/M2

## 2018-01-17 DIAGNOSIS — F41.1 ANXIETY STATE: ICD-10-CM

## 2018-01-17 DIAGNOSIS — F32.0 MILD MAJOR DEPRESSION (H): ICD-10-CM

## 2018-01-17 DIAGNOSIS — F41.9 ANXIETY: Primary | ICD-10-CM

## 2018-01-17 PROCEDURE — 99207 ZZC NO CHARGE BEHAVIORAL WARM HANDOFF: CPT | Performed by: MARRIAGE & FAMILY THERAPIST

## 2018-01-17 PROCEDURE — 99213 OFFICE O/P EST LOW 20 MIN: CPT | Performed by: PHYSICIAN ASSISTANT

## 2018-01-17 NOTE — PROGRESS NOTES
Warm-handoff    BEHAVIORAL HEALTH CLINICIAN introduced and explained integrated health model, brief therapy interventions and referral and support services for ongoing therapy interventions.   Patient reports increased anxiety with family relational dynamics, as her mother has had to be placed in nursing home, and she is oldest of 7 children, conflicted and distant interactions with one brother that is creating increased stress.  Explained to patient unable to provide therapy services due to insurance type Medicare; explained referral to [facility] and getting scheduled with community mental health therapist who is Medicare approved. Patient agreed to to plan, will contact [facility] if she needs further assistance with connecting with therapy services.

## 2018-01-17 NOTE — NURSING NOTE
"Chief Complaint   Patient presents with     Depression     Anxiety       Initial /66  Pulse 82  Wt 182 lb (82.6 kg)  SpO2 98%  BMI 31.73 kg/m2 Estimated body mass index is 31.73 kg/(m^2) as calculated from the following:    Height as of 7/8/15: 5' 3.5\" (1.613 m).    Weight as of this encounter: 182 lb (82.6 kg).  Medication Reconciliation: complete  Ave Monge CMA    "

## 2018-01-17 NOTE — MR AVS SNAPSHOT
After Visit Summary   1/17/2018    Molly Ospina    MRN: [id number]           Patient Information     Date Of Birth          1950        Visit Information        Provider Department      1/17/2018 10:14 AM Catrina Meyers [facility]        Today's Diagnoses     Anxiety    -  1    Mild major depression (H)           Follow-ups after your visit        Additional Services     MENTAL HEALTH REFERRAL  - Adult; Outpatient Treatment; Individual/Couples/Family/Group Therapy/Health Psychology; Other: Behavioral Healthcare Providers [phone]; We will contact you to schedule the appointment or please call with any questi...       All scheduling is subject to the client's specific insurance plan & benefits, provider/location availability, and provider clinical specialities.  Please arrive 15 minutes early for your first appointment and bring your completed paperwork.    Please be aware that coverage of these services is subject to the terms and limitations of your health insurance plan.  Call member services at your health plan with any benefit or coverage questions.                            Who to contact     If you have questions or need follow up information about today's clinic visit or your schedule please contact [facility] directly at [phone].  Normal or non-critical lab and imaging results will be communicated to you by MyChart, letter or phone within 4 business days after the clinic has received the results. If you do not hear from us within 7 days, please contact the clinic through MyChart or phone. If you have a critical or abnormal lab result, we will notify you by phone as soon as possible.  Submit refill requests through [employer] or call your pharmacy and they will forward the refill request to us. Please allow 3 business days for your refill to be completed.          Additional Information About Your Visit        MyChart Information      [employer] gives you secure access to your electronic health record. If you see a primary care provider, you can also send messages to your care team and make appointments. If you have questions, please call your primary care clinic.  If you do not have a primary care provider, please call [phone] and they will assist you.        Care EveryWhere ID     This is your Care EveryWhere ID. This could be used by other organizations to access your [facility] medical records  [id number]         Blood Pressure from Last 3 Encounters:   01/17/18 129/66   12/12/17 120/60   06/08/17 115/61    Weight from Last 3 Encounters:   01/17/18 82.6 kg (182 lb)   12/12/17 82.1 kg (181 lb)   06/08/17 81.6 kg (180 lb)              We Performed the Following     MENTAL HEALTH REFERRAL  - Adult; Outpatient Treatment; Individual/Couples/Family/Group Therapy/Health Psychology; Other: Behavioral Healthcare Providers [phone]; We will contact you to schedule the appointment or please call with any [facility]...        Primary Care Provider Office Phone # Fax #    Gil Dumas PA-C [phone] [address] [facility] [address]        Equal Access to Services     RASHID MONROY : Hadii dina rush hadasho Soomaali, waaxda luqadaha, qaybta kaalmada adeegyada, reymundo reyes. So [facility] [phone].    ATENCIÓN: Si habla español, tiene a ruelas disposición servicios gratuitos de asistencia lingüística. Llame al [phone].    We comply with applicable federal civil rights laws and [place] laws. We do not discriminate on the basis of race, color, national origin, age, disability, sex, sexual orientation, or gender identity.            Thank you!     Thank you for choosing [facility]  for your care. Our goal is always to provide you with excellent care. Hearing back from our patients is one way we can continue to improve our services. Please take a few minutes to complete the written  survey that you may receive in the mail after your visit with us. Thank you!             Your Updated Medication List - Protect others around you: Learn how to safely use, store and throw away your medicines at www.disposemymeds.org.          This list is accurate as of: 1/17/18 10:18 AM.  Always use your most recent med list.                   Brand Name Dispense Instructions for use Diagnosis    aspirin 325 MG EC tablet      Take 325 mg by mouth daily.        COMBIGAN 0.2-0.5 % ophthalmic solution   Generic drug:  brimonidine-timolol      Place 1 drop into both eyes 2 times daily        FISH OIL PO      1 tablet daily    HTN (hypertension)       hydrochlorothiazide 25 MG tablet    HYDRODIURIL    90 tablet    TAKE 1 TABLET EVERY MORNING    Benign essential hypertension       levothyroxine 75 MCG tablet    SYNTHROID/LEVOTHROID    90 tablet    Take 1 tablet (75 mcg) by mouth daily    Hypothyroidism, unspecified type       lisinopril 10 MG tablet    PRINIVIL/ZESTRIL    90 tablet    Take 1 tablet (10 mg) by mouth daily    Benign essential hypertension       metoprolol succinate 50 MG 24 hr tablet    TOPROL-XL    90 tablet    Take 1 tablet (50 mg) by mouth daily    Benign essential hypertension       MULTIVITAMIN PO           NIACIN PO           rosuvastatin 10 MG tablet    CRESTOR    90 tablet    Take 1 tablet (10 mg) by mouth daily    High triglycerides       TYLENOL 500 MG tablet   Generic drug:  acetaminophen      Take 1-2 tablets by mouth every 6 hours as needed.        venlafaxine 37.5 MG 24 hr capsule    EFFEXOR-XR    90 capsule    Take 1 capsule (37.5 mg) by mouth daily    Major depressive disorder, recurrent episode, mild (H)

## 2018-01-17 NOTE — MR AVS SNAPSHOT
After Visit Summary   1/17/2018    Molly Ospina    MRN: [id number]           Patient Information     Date Of Birth          1950        Visit Information        Provider Department      1/17/2018 9:40 AM Gil Dumas PA-C [facility]        Today's Diagnoses     Anxiety state        Mild major depression (H)           Follow-ups after your visit        Who to contact     If you have questions or need follow up information about today's clinic visit or your schedule please contact [facility] directly at [phone].  Normal or non-critical lab and imaging results will be communicated to you by [employer], letter or phone within 4 business days after the clinic has received the results. If you do not hear from us within 7 days, please contact the clinic through [employer] or phone. If you have a critical or abnormal lab result, we will notify you by phone as soon as possible.  Submit refill requests through [employer] or call your pharmacy and they will forward the refill request to us. Please allow 3 business days for your refill to be completed.          Additional Information About Your Visit        MyChart Information     [employer] gives you secure access to your electronic health record. If you see a primary care provider, you can also send messages to your care team and make appointments. If you have questions, please call your primary care clinic.  If you do not have a primary care provider, please call [phone] and they will assist you.        Care EveryWhere ID     This is your Care EveryWhere ID. This could be used by other organizations to access your [facility] medical records  [id number]        Your Vitals Were     Pulse Pulse Oximetry BMI (Body Mass Index)             82 98% 31.73 kg/m2          Blood Pressure from Last 3 Encounters:   01/17/18 129/66   12/12/17 120/60   06/08/17 115/61    Weight from Last 3 Encounters:   01/17/18 182 lb (82.6 kg)    12/12/17 181 lb (82.1 kg)   06/08/17 180 lb (81.6 kg)              Today, you had the following     No orders found for display       Primary Care Provider Office Phone # Fax #    Gil Dumas PA-C [phone] [address] [address] [facility] [address]        Equal Access to Services     [facility]: Hadii aad ku hadasho Soomaali, waaxda luqadaha, qaybta kaalmada adeegyada, waxay idiin hayaan adeeg kharash la'aan . So [facility] [phone].    ATENCIÓN: Si habla español, tiene a ruelas disposición servicios gratuitos de asistencia lingüística. Emiliaame al [phone].    We comply with applicable federal civil rights laws and [place] laws. We do not discriminate on the basis of race, color, national origin, age, disability, sex, sexual orientation, or gender identity.            Thank you!     Thank you for choosing [facility]  for your care. Our goal is always to provide you with excellent care. Hearing back from our patients is one way we can continue to improve our services. Please take a few minutes to complete the written survey that you may receive in the mail after your visit with us. Thank you!             Your Updated Medication List - Protect others around you: Learn how to safely use, store and throw away your medicines at www.disposemymeds.org.          This list is accurate as of: 1/17/18 10:40 AM.  Always use your most recent med list.                   Brand Name Dispense Instructions for use Diagnosis    aspirin 325 MG EC tablet      Take 325 mg by mouth daily.        COMBIGAN 0.2-0.5 % ophthalmic solution   Generic drug:  brimonidine-timolol      Place 1 drop into both eyes 2 times daily        FISH OIL PO      1 tablet daily    HTN (hypertension)       hydrochlorothiazide 25 MG tablet    HYDRODIURIL    90 tablet    TAKE 1 TABLET EVERY MORNING    Benign essential hypertension       levothyroxine 75 MCG tablet    SYNTHROID/LEVOTHROID    90 tablet    Take 1 tablet (75  mcg) by mouth daily    Hypothyroidism, unspecified type       lisinopril 10 MG tablet    PRINIVIL/ZESTRIL    90 tablet    Take 1 tablet (10 mg) by mouth daily    Benign essential hypertension       metoprolol succinate 50 MG 24 hr tablet    TOPROL-XL    90 tablet    Take 1 tablet (50 mg) by mouth daily    Benign essential hypertension       MULTIVITAMIN PO           NIACIN PO           rosuvastatin 10 MG tablet    CRESTOR    90 tablet    Take 1 tablet (10 mg) by mouth daily    High triglycerides       TYLENOL 500 MG tablet   Generic drug:  acetaminophen      Take 1-2 tablets by mouth every 6 hours as needed.        venlafaxine 37.5 MG 24 hr capsule    EFFEXOR-XR    90 capsule    Take 1 capsule (37.5 mg) by mouth daily    Major depressive disorder, recurrent episode, mild (H)

## 2018-07-10 ENCOUNTER — MYC MEDICAL ADVICE (OUTPATIENT)
Dept: FAMILY MEDICINE | Facility: CLINIC | Age: 68
End: 2018-07-10

## 2018-07-10 DIAGNOSIS — Z13.220 SCREENING CHOLESTEROL LEVEL: ICD-10-CM

## 2018-07-10 DIAGNOSIS — Z13.1 SCREENING FOR DIABETES MELLITUS: Primary | ICD-10-CM

## 2018-07-10 DIAGNOSIS — Z78.0 MENOPAUSE: ICD-10-CM

## 2018-07-10 DIAGNOSIS — Z12.31 ENCOUNTER FOR SCREENING MAMMOGRAM FOR BREAST CANCER: ICD-10-CM

## 2018-07-11 NOTE — TELEPHONE ENCOUNTER
Please order mammogram and dexa scan if needed. Please send back to pool to contact patient to schedule./Elda Miller,

## 2018-07-18 NOTE — PROGRESS NOTES
SUBJECTIVE:                                                    Molly Ospina is a 67 year old female who presents to clinic today for the following health issues:    Answers for HPI/ROS submitted by the patient on 7/16/2018   Chronic problems general questions HPI Form  Depression/Anxiety: Depression & Anxiety  Status since last visit:: Improved  Other associated symptoms of depression and anxiety:: None  Significant life event:: YES  Current substance use:: None    Problem list and histories reviewed & adjusted, as indicated.  Additional history: as documented  Hyperlipidemia Follow-Up      Rate your low fat/cholesterol diet?: good    Taking statin?  No    Other lipid medications/supplements?:  none    Hypertension Follow-up      Outpatient blood pressures are not being checked.    Low Salt Diet: no added salt    Hypothyroidism Follow-up      Since last visit, patient describes the following symptoms: Weight stable, no hair loss, no skin changes, no constipation, no loose stools      Patient Active Problem List   Diagnosis     High triglycerides     Anxiety state     Mild major depression (H)     CARDIOVASCULAR SCREENING; LDL GOAL LESS THAN 130     Hyperhidrosis of face     Advanced directives, counseling/discussion     Obesity     AK (actinic keratosis)     Hypothyroidism, unspecified type     Benign essential hypertension     High blood cholesterol     Major depressive disorder, recurrent episode, mild (H)     Seborrheic keratoses     Past Surgical History:   Procedure Laterality Date     HYSTERECTOMY, SEGUNDO       LASIK       TONSILLECTOMY & ADENOIDECTOMY         Social History   Substance Use Topics     Smoking status: Former Smoker     Quit date: 1/1/1985     Smokeless tobacco: Former User     Alcohol use Yes      Comment: OCCASIONAL     Family History   Problem Relation Age of Onset     Breast Cancer Mother      HEART DISEASE Mother      Lipids Mother      Osteoperosis Mother      Hypertension Mother       C.A.D. Mother      Cancer Mother      melanoma on the leg     HEART DISEASE Father      Lipids Father      Thyroid Disease Father      Hypertension Father      C.A.D. Father      Cardiovascular Father      Cancer Father      arm skin cancer     Respiratory Father      pulmonary fibrosis - on O2     Breast Cancer Maternal Grandmother      Alzheimer Disease Maternal Grandmother      Diabetes Maternal Grandmother      HEART DISEASE Maternal Grandfather      Cancer - colorectal Paternal Grandmother      HEART DISEASE Paternal Grandfather      Lipids Son      Lipids Son      Diabetes Brother          Current Outpatient Prescriptions   Medication Sig Dispense Refill     hydrochlorothiazide (HYDRODIURIL) 25 MG tablet TAKE 1 TABLET EVERY MORNING 90 tablet 1     levothyroxine (SYNTHROID/LEVOTHROID) 75 MCG tablet Take 1 tablet (75 mcg) by mouth daily 90 tablet 1     lisinopril (PRINIVIL/ZESTRIL) 10 MG tablet Take 1 tablet (10 mg) by mouth daily 90 tablet 1     metoprolol succinate (TOPROL-XL) 50 MG 24 hr tablet Take 1 tablet (50 mg) by mouth daily 90 tablet 1     venlafaxine (EFFEXOR-XR) 37.5 MG 24 hr capsule Take 1 capsule (37.5 mg) by mouth daily 90 capsule 1     acetaminophen (TYLENOL) 500 MG tablet Take 1-2 tablets by mouth every 6 hours as needed.       aspirin 325 MG EC tablet Take 325 mg by mouth daily.       FISH OIL OR 1 tablet daily       Multiple Vitamins-Minerals (MULTIVITAMIN PO)        NIACIN PO        rosuvastatin (CRESTOR) 10 MG tablet Take 1 tablet (10 mg) by mouth daily 90 tablet 3     timolol (TIMOPTIC-XE) 0.5 % ophthalmic gel-form        XIIDRA 5 % SOLN opthalmic solution        [DISCONTINUED] hydrochlorothiazide (HYDRODIURIL) 25 MG tablet TAKE 1 TABLET EVERY MORNING 90 tablet 1     [DISCONTINUED] levothyroxine (SYNTHROID/LEVOTHROID) 75 MCG tablet Take 1 tablet (75 mcg) by mouth daily 90 tablet 1     [DISCONTINUED] lisinopril (PRINIVIL/ZESTRIL) 10 MG tablet Take 1 tablet (10 mg) by mouth daily 90 tablet 1      [DISCONTINUED] metoprolol (TOPROL-XL) 50 MG 24 hr tablet Take 1 tablet (50 mg) by mouth daily 90 tablet 1     [DISCONTINUED] venlafaxine (EFFEXOR-XR) 37.5 MG 24 hr capsule Take 1 capsule (37.5 mg) by mouth daily 90 capsule 1     No Known Allergies    ROS:  Constitutional, HEENT, cardiovascular, pulmonary, gi and gu systems are negative, except as otherwise noted.    OBJECTIVE:     /60  Pulse 71  Temp 97.5  F (36.4  C) (Oral)  Resp 18  Wt 182 lb (82.6 kg)  SpO2 98%  BMI 31.73 kg/m2  Body mass index is 31.73 kg/(m^2).  GENERAL: healthy, alert and no distress  Head: Normocephalic, atraumatic.  Eyes: Conjunctiva clear, non icteric. PERRLA.  Ears: External ears and TMs normal BL.  Nose: Septum midline, nasal mucosa pink and moist. No discharge.  Mouth / Throat: Normal dentition.  No oral lesions. Pharynx non erythematous, tonsils without hypertrophy.  Neck: Supple, no enlarged LN, trachea midline.  Heart: S1 and S2 normal, no murmurs, clicks, gallops or rubs. Regular rate and rhythm.  Chest: Clear; no wheezes or rales.  PSYCH: mentation appears normal, affect normal/bright    Diagnostic Test Results:  none     ASSESSMENT/PLAN:         ICD-10-CM    1. Major depressive disorder, recurrent episode, mild (H) F33.0 MENTAL HEALTH REFERRAL  - Adult; Outpatient Treatment; Individual/Couples/Family/Group Therapy/Health Psychology; Other: Behavioral Healthcare Providers [phone]; We will contact you to schedule the appointment or please call with any questi...     venlafaxine (EFFEXOR-XR) 37.5 MG 24 hr capsule   2. Anxiety state F41.1 MENTAL HEALTH REFERRAL  - Adult; Outpatient Treatment; Individual/Couples/Family/Group Therapy/Health Psychology; Other: Behavioral Healthcare Providers [phone]; We will contact you to schedule the appointment or please call with any questi...   3. Benign essential hypertension I10 hydrochlorothiazide (HYDRODIURIL) 25 MG tablet     lisinopril (PRINIVIL/ZESTRIL) 10 MG  tablet     metoprolol succinate (TOPROL-XL) 50 MG 24 hr tablet   4. Hypothyroidism, unspecified type E03.9 levothyroxine (SYNTHROID/LEVOTHROID) 75 MCG tablet   5. High blood cholesterol E78.00 Lipid panel reflex to direct LDL Fasting     Comprehensive metabolic panel       meds refilled  Labs pending  Get into counseling.   Recheck 6 months    Gil Dumas PA-C  [facility]

## 2018-07-19 ENCOUNTER — OFFICE VISIT (OUTPATIENT)
Dept: FAMILY MEDICINE | Facility: CLINIC | Age: 68
End: 2018-07-19
Payer: MEDICARE

## 2018-07-19 VITALS
SYSTOLIC BLOOD PRESSURE: 109 MMHG | OXYGEN SATURATION: 98 % | HEART RATE: 71 BPM | BODY MASS INDEX: 31.73 KG/M2 | TEMPERATURE: 97.5 F | WEIGHT: 182 LBS | DIASTOLIC BLOOD PRESSURE: 60 MMHG | RESPIRATION RATE: 18 BRPM

## 2018-07-19 DIAGNOSIS — F41.1 ANXIETY STATE: ICD-10-CM

## 2018-07-19 DIAGNOSIS — F33.0 MAJOR DEPRESSIVE DISORDER, RECURRENT EPISODE, MILD (H): Primary | ICD-10-CM

## 2018-07-19 DIAGNOSIS — I10 BENIGN ESSENTIAL HYPERTENSION: ICD-10-CM

## 2018-07-19 DIAGNOSIS — E03.9 HYPOTHYROIDISM, UNSPECIFIED TYPE: ICD-10-CM

## 2018-07-19 DIAGNOSIS — E78.00 HIGH BLOOD CHOLESTEROL: ICD-10-CM

## 2018-07-19 LAB
ALBUMIN SERPL-MCNC: 4.2 G/DL (ref 3.4–5)
ALP SERPL-CCNC: 76 U/L (ref 40–150)
ALT SERPL W P-5'-P-CCNC: 31 U/L (ref 0–50)
ANION GAP SERPL CALCULATED.3IONS-SCNC: 6 MMOL/L (ref 3–14)
AST SERPL W P-5'-P-CCNC: 26 U/L (ref 0–45)
BILIRUB SERPL-MCNC: 0.8 MG/DL (ref 0.2–1.3)
BUN SERPL-MCNC: 15 MG/DL (ref 7–30)
CALCIUM SERPL-MCNC: 9.2 MG/DL (ref 8.5–10.1)
CHLORIDE SERPL-SCNC: 105 MMOL/L (ref 94–109)
CHOLEST SERPL-MCNC: 144 MG/DL
CO2 SERPL-SCNC: 28 MMOL/L (ref 20–32)
CREAT SERPL-MCNC: 0.65 MG/DL (ref 0.52–1.04)
GFR SERPL CREATININE-BSD FRML MDRD: >90 ML/MIN/1.7M2
GLUCOSE SERPL-MCNC: 113 MG/DL (ref 70–99)
HDLC SERPL-MCNC: 50 MG/DL
LDLC SERPL CALC-MCNC: 61 MG/DL
NONHDLC SERPL-MCNC: 94 MG/DL
POTASSIUM SERPL-SCNC: 4.9 MMOL/L (ref 3.4–5.3)
PROT SERPL-MCNC: 7.2 G/DL (ref 6.8–8.8)
SODIUM SERPL-SCNC: 139 MMOL/L (ref 133–144)
TRIGL SERPL-MCNC: 166 MG/DL

## 2018-07-19 PROCEDURE — 80061 LIPID PANEL: CPT | Performed by: PHYSICIAN ASSISTANT

## 2018-07-19 PROCEDURE — 36415 COLL VENOUS BLD VENIPUNCTURE: CPT | Performed by: PHYSICIAN ASSISTANT

## 2018-07-19 PROCEDURE — 99213 OFFICE O/P EST LOW 20 MIN: CPT | Performed by: PHYSICIAN ASSISTANT

## 2018-07-19 PROCEDURE — 80053 COMPREHEN METABOLIC PANEL: CPT | Performed by: PHYSICIAN ASSISTANT

## 2018-07-19 RX ORDER — LISINOPRIL 10 MG/1
10 TABLET ORAL DAILY
Qty: 90 TABLET | Refills: 1 | Status: SHIPPED | OUTPATIENT
Start: 2018-07-19 | End: 2019-04-09

## 2018-07-19 RX ORDER — TIMOLOL MALEATE 5 MG/ML
SOLUTION OPHTHALMIC
COMMUNITY
Start: 2018-07-10 | End: 2024-06-26

## 2018-07-19 RX ORDER — METOPROLOL SUCCINATE 50 MG/1
50 TABLET, EXTENDED RELEASE ORAL DAILY
Qty: 90 TABLET | Refills: 1 | Status: SHIPPED | OUTPATIENT
Start: 2018-07-19 | End: 2019-02-13

## 2018-07-19 RX ORDER — LIFITEGRAST 50 MG/ML
SOLUTION/ DROPS OPHTHALMIC
COMMUNITY
Start: 2018-05-30 | End: 2020-05-13

## 2018-07-19 RX ORDER — VENLAFAXINE HYDROCHLORIDE 37.5 MG/1
37.5 CAPSULE, EXTENDED RELEASE ORAL DAILY
Qty: 90 CAPSULE | Refills: 1 | Status: SHIPPED | OUTPATIENT
Start: 2018-07-19 | End: 2019-01-31

## 2018-07-19 RX ORDER — HYDROCHLOROTHIAZIDE 25 MG/1
TABLET ORAL
Qty: 90 TABLET | Refills: 1 | Status: SHIPPED | OUTPATIENT
Start: 2018-07-19 | End: 2019-04-09

## 2018-07-19 RX ORDER — LEVOTHYROXINE SODIUM 75 UG/1
75 TABLET ORAL DAILY
Qty: 90 TABLET | Refills: 1 | Status: SHIPPED | OUTPATIENT
Start: 2018-07-19 | End: 2019-02-06

## 2018-07-19 NOTE — NURSING NOTE
"Chief Complaint   Patient presents with     Depression     Health Maintenance     ADP, Fall Risk, PHQ9       Initial /60  Pulse 71  Temp 97.5  F (36.4  C) (Oral)  Resp 18  Wt 182 lb (82.6 kg)  SpO2 98%  BMI 31.73 kg/m2 Estimated body mass index is 31.73 kg/(m^2) as calculated from the following:    Height as of 7/8/15: 5' 3.5\" (1.613 m).    Weight as of this encounter: 182 lb (82.6 kg).  Medication Reconciliation: complete  Ave Monge CMA    "

## 2018-07-19 NOTE — LETTER
My Depression Action Plan  Name: Molly Ospina   Date of Birth 1950  Date: 7/18/2018    My doctor: Gil Dumas   My clinic: [facility]  [id number] [place] [id number] [phone]          GREEN    ZONE   Good Control    What it looks like:     Things are going generally well. You have normal up s and down s. You may even feel depressed from time to time, but bad moods usually last less than a day.   What you need to do:  1. Continue to care for yourself (see self care plan)  2. Check your depression survival kit and update it as needed  3. Follow your physician s recommendations including any medication.  4. Do not stop taking medication unless you consult with your physician first.           YELLOW         ZONE Getting Worse    What it looks like:     Depression is starting to interfere with your life.     It may be hard to get out of bed; you may be starting to isolate yourself from others.    Symptoms of depression are starting to last most all day and this has happened for several days.     You may have suicidal thoughts but they are not constant.   What you need to do:     1. Call your care team, your response to treatment will improve if you keep your care team informed of your progress. Yellow periods are signs an adjustment may need to be made.     2. Continue your self-care, even if you have to fake it!    3. Talk to someone in your support network    4. Open up your depression survival kit           RED    ZONE Medical Alert - Get Help    What it looks like:     Depression is seriously interfering with your life.     You may experience these or other symptoms: You can t get out of bed most days, can t work or engage in other necessary activities, you have trouble taking care of basic hygiene, or basic responsibilities, thoughts of suicide or death that will not go away, self-injurious behavior.     What you need to do:  1. Call your care team and  request a same-day appointment. If they are not available (weekends or after hours) call your local crisis line, emergency room or 911.            Depression Self Care Plan / Survival Kit    Self-Care for Depression  Here s the deal. Your body and mind are really not as separate as most people think.  What you do and think affects how you feel and how you feel influences what you do and think. This means if you do things that people who feel good do, it will help you feel better.  Sometimes this is all it takes.  There is also a place for medication and therapy depending on how severe your depression is, so be sure to consult with your medical provider and/ or Behavioral Health Consultant if your symptoms are worsening or not improving.     In order to better manage my stress, I will:    Exercise  Get some form of exercise, every day. This will help reduce pain and release endorphins, the  feel good  chemicals in your brain. This is almost as good as taking antidepressants!  This is not the same as joining a gym and then never going! (they count on that by the way ) It can be as simple as just going for a walk or doing some gardening, anything that will get you moving.      Hygiene   Maintain good hygiene (Get out of bed in the morning, Make your bed, Brush your teeth, Take a shower, and Get dressed like you were going to work, even if you are unemployed).  If your clothes don't fit try to get ones that do.    Diet  I will strive to eat foods that are good for me, drink plenty of water, and avoid excessive sugar, caffeine, alcohol, and other mood-altering substances.  Some foods that are helpful in depression are: complex carbohydrates, B vitamins, flaxseed, fish or fish oil, fresh fruits and vegetables.    Psychotherapy  I agree to participate in Individual Therapy (if recommended).    Medication  If prescribed medications, I agree to take them.  Missing doses can result in serious side effects.  I understand that  drinking alcohol, or other illicit drug use, may cause potential side effects.  I will not stop my medication abruptly without first discussing it with my provider.    Staying Connected With Others  I will stay in touch with my friends, family members, and my primary care provider/team.    Use your imagination  Be creative.  We all have a creative side; it doesn t matter if it s oil painting, sand castles, or mud pies! This will also kick up the endorphins.    Witness Beauty  (AKA stop and smell the roses) Take a look outside, even in mid-winter. Notice colors, textures. Watch the squirrels and birds.     Service to others  Be of service to others.  There is always someone else in need.  By helping others we can  get out of ourselves  and remember the really important things.  This also provides opportunities for practicing all the other parts of the program.    Humor  Laugh and be silly!  Adjust your TV habits for less news and crime-drama and more comedy.    Control your stress  Try breathing deep, massage therapy, biofeedback, and meditation. Find time to relax each day.     My support system    Clinic Contact:  Phone number:    Contact 1:  Phone number:    Contact 2:  Phone number:    Voodoo/:  Phone number:    Therapist:  Phone number:    Local crisis center:    Phone number:    Other community support:  Phone number:

## 2018-07-19 NOTE — PROGRESS NOTES
Ms. Ospina,    All of your labs were normal for you.  Your blood sugers are slightly elevated, similar to past labs.   Work on Healthy diet and exercise. Getting heart rate elevated for 30 mins most days of week.   Recheck again in 6 months.     Please contact the clinic if you have additional questions.  Thank you.    Sincerely,    Gil Dumas PA-C

## 2018-07-19 NOTE — MR AVS SNAPSHOT
After Visit Summary   7/19/2018    Molly Ospina    MRN: [id number]           Patient Information     Date Of Birth          1950        Visit Information        Provider Department      7/19/2018 9:20 AM Gil Dumas PA-C [facility]        Today's Diagnoses     Major depressive disorder, recurrent episode, mild (H)    -  1    Anxiety state        Benign essential hypertension        Hypothyroidism, unspecified type        High blood cholesterol           Follow-ups after your visit        Additional Services     MENTAL HEALTH REFERRAL  - Adult; Outpatient Treatment; Individual/Couples/Family/Group Therapy/Health Psychology; Other: Behavioral Healthcare Providers [phone]; We will contact you to schedule the appointment or please call with any questi...       All scheduling is subject to the client's specific insurance plan & benefits, provider/location availability, and provider clinical specialities.  Please arrive 15 minutes early for your first appointment and bring your completed paperwork.    Please be aware that coverage of these services is subject to the terms and limitations of your health insurance plan.  Call member services at your health plan with any benefit or coverage questions.                            Your next 10 appointments already scheduled     Oct 26, 2018  9:00 AM CDT   DX HIP/PELVIS/SPINE with FKDX1   [facility] ([facility]    [address] [place] [id number] [phone]           Please do not take any of the following 24 hours prior to the day of your exam: vitamins, calcium tablets, antacids.  If possible, please wear clothes without metal (snaps, zippers). A sweatsuit works well.            Oct 26, 2018  9:30 AM CDT   MA SCREENING DIGITAL BILATERAL with FKMA1   [facility] ([facility])    [address] [place] [id number] [phone]            Do not use any powder, lotion or deodorant under your arms or on your breast. If you do, we will ask you to remove it before your exam.  Wear comfortable, two-piece clothing.  If you have any allergies, tell your care team.  Bring any previous mammograms from other facilities or have them mailed to the breast center.              Who to contact     If you have questions or need follow up information about today's clinic visit or your schedule please contact [facility] directly at [phone].  Normal or non-critical lab and imaging results will be communicated to you by SilkStarthart, letter or phone within 4 business days after the clinic has received the results. If you do not hear from us within 7 days, please contact the clinic through [employer] or phone. If you have a critical or abnormal lab result, we will notify you by phone as soon as possible.  Submit refill requests through [employer] or call your pharmacy and they will forward the refill request to us. Please allow 3 business days for your refill to be completed.          Additional Information About Your Visit        SilkStartharTellWise Information     [employer] gives you secure access to your electronic health record. If you see a primary care provider, you can also send messages to your care team and make appointments. If you have questions, please call your primary care clinic.  If you do not have a primary care provider, please call [phone] and they will assist you.        Care EveryWhere ID     This is your Care EveryWhere ID. This could be used by other organizations to access your [facility] medical records  [id number]        Your Vitals Were     Pulse Temperature Respirations Pulse Oximetry BMI (Body Mass Index)       71 97.5  F (36.4  C) (Oral) 18 98% 31.73 kg/m2        Blood Pressure from Last 3 Encounters:   07/19/18 109/60   01/17/18 129/66   12/12/17 120/60    Weight from Last 3 Encounters:   07/19/18 182 lb (82.6 kg)   01/17/18 182 lb (82.6  kg)   12/12/17 181 lb (82.1 kg)              We Performed the Following     Comprehensive metabolic panel     Lipid panel reflex to direct LDL Fasting     MENTAL HEALTH REFERRAL  - Adult; Outpatient Treatment; Individual/Couples/Family/Group Therapy/Health Psychology; Other: Behavioral Healthcare Providers [phone]; We will contact you to schedule the appointment or please call with any questi...          Where to get your medicines      These medications were sent to [facility] - [address]  [address] [place] [address]     Phone:  [phone]     hydrochlorothiazide 25 MG tablet    levothyroxine 75 MCG tablet    lisinopril 10 MG tablet    metoprolol succinate 50 MG 24 hr tablet    venlafaxine 37.5 MG 24 hr capsule          Primary Care Provider Office Phone # Fax #    Gil Dumas PA-C [phone] [address] [facility] [address]        Equal Access to Services     RASHID MONROY AH: Hadii aad ku hadasho Soomaali, waaxda luqadaha, qaybta kaalmada adeegyada, waxay idiin hayaan edward villegas . So [facility] [phone].    ATENCIÓN: Si pasha espjax, tiene a ruelas disposición servicios gratuitos de asistencia lingüística. Flo al [phone].    We comply with applicable federal civil rights laws and [place] laws. We do not discriminate on the basis of race, color, national origin, age, disability, sex, sexual orientation, or gender identity.            Thank you!     Thank you for choosing [facility]  for your care. Our goal is always to provide you with excellent care. Hearing back from our patients is one way we can continue to improve our services. Please take a few minutes to complete the written survey that you may receive in the mail after your visit with us. Thank you!             Your Updated Medication List - Protect others around you: Learn how to safely use, store and throw away your  medicines at www.disposemymeds.org.          This list is accurate as of 7/19/18  9:38 AM.  Always use your most recent med list.                   Brand Name Dispense Instructions for use Diagnosis    aspirin 325 MG EC tablet      Take 325 mg by mouth daily.        FISH OIL PO      1 tablet daily    HTN (hypertension)       hydrochlorothiazide 25 MG tablet    HYDRODIURIL    90 tablet    TAKE 1 TABLET EVERY MORNING    Benign essential hypertension       levothyroxine 75 MCG tablet    SYNTHROID/LEVOTHROID    90 tablet    Take 1 tablet (75 mcg) by mouth daily    Hypothyroidism, unspecified type       lisinopril 10 MG tablet    PRINIVIL/ZESTRIL    90 tablet    Take 1 tablet (10 mg) by mouth daily    Benign essential hypertension       metoprolol succinate 50 MG 24 hr tablet    TOPROL-XL    90 tablet    Take 1 tablet (50 mg) by mouth daily    Benign essential hypertension       MULTIVITAMIN PO           NIACIN PO           rosuvastatin 10 MG tablet    CRESTOR    90 tablet    Take 1 tablet (10 mg) by mouth daily    High triglycerides       timolol 0.5 % ophthalmic gel-form    TIMOPTIC-XE          TYLENOL 500 MG tablet   Generic drug:  acetaminophen      Take 1-2 tablets by mouth every 6 hours as needed.        venlafaxine 37.5 MG 24 hr capsule    EFFEXOR-XR    90 capsule    Take 1 capsule (37.5 mg) by mouth daily    Major depressive disorder, recurrent episode, mild (H)       XIIDRA 5 % Soln opthalmic solution   Generic drug:  Lifitegrast

## 2018-07-20 ASSESSMENT — PATIENT HEALTH QUESTIONNAIRE - PHQ9: SUM OF ALL RESPONSES TO PHQ QUESTIONS 1-9: 9

## 2018-10-26 ENCOUNTER — RADIANT APPOINTMENT (OUTPATIENT)
Dept: BONE DENSITY | Facility: CLINIC | Age: 68
End: 2018-10-26
Attending: PHYSICIAN ASSISTANT
Payer: MEDICARE

## 2018-10-26 ENCOUNTER — RADIANT APPOINTMENT (OUTPATIENT)
Dept: MAMMOGRAPHY | Facility: CLINIC | Age: 68
End: 2018-10-26
Attending: PHYSICIAN ASSISTANT
Payer: MEDICARE

## 2018-10-26 DIAGNOSIS — Z12.31 VISIT FOR SCREENING MAMMOGRAM: ICD-10-CM

## 2018-10-26 DIAGNOSIS — Z78.0 MENOPAUSE: ICD-10-CM

## 2018-10-26 PROCEDURE — 77067 SCR MAMMO BI INCL CAD: CPT | Mod: TC

## 2018-10-26 PROCEDURE — 77080 DXA BONE DENSITY AXIAL: CPT | Performed by: INTERNAL MEDICINE

## 2018-10-30 NOTE — PROGRESS NOTES
Ms. Ospina,    All of your labs were normal for you.    Please contact the clinic if you have additional questions.  Thank you.    Sincerely,    Gil Dumas PA-C

## 2018-11-06 ENCOUNTER — OFFICE VISIT (OUTPATIENT)
Dept: FAMILY MEDICINE | Facility: CLINIC | Age: 68
End: 2018-11-06
Payer: MEDICARE

## 2018-11-06 VITALS
DIASTOLIC BLOOD PRESSURE: 74 MMHG | HEART RATE: 98 BPM | RESPIRATION RATE: 16 BRPM | TEMPERATURE: 97.5 F | WEIGHT: 183 LBS | OXYGEN SATURATION: 97 % | SYSTOLIC BLOOD PRESSURE: 138 MMHG | BODY MASS INDEX: 31.91 KG/M2

## 2018-11-06 DIAGNOSIS — I10 BENIGN ESSENTIAL HYPERTENSION: ICD-10-CM

## 2018-11-06 DIAGNOSIS — R09.81 NASAL CONGESTION: ICD-10-CM

## 2018-11-06 DIAGNOSIS — R22.0 LIP SWELLING: Primary | ICD-10-CM

## 2018-11-06 PROCEDURE — 99214 OFFICE O/P EST MOD 30 MIN: CPT | Performed by: INTERNAL MEDICINE

## 2018-11-06 RX ORDER — EPINEPHRINE 0.3 MG/.3ML
0.3 INJECTION SUBCUTANEOUS PRN
Qty: 0.6 ML | Refills: 0 | Status: SHIPPED | OUTPATIENT
Start: 2018-11-06 | End: 2021-04-28

## 2018-11-06 ASSESSMENT — PAIN SCALES - GENERAL: PAINLEVEL: NO PAIN (0)

## 2018-11-06 NOTE — NURSING NOTE
"Chief Complaint   Patient presents with     Allergic Reaction     pt c/o possible allerci reaction, c/o abdominal cramping, diarrhea, faction swelling and increased BP. Pt states this has happened 3 times in the past 3 months lasting about 15 mins.        Initial /74  Pulse 98  Temp 97.5  F (36.4  C) (Oral)  Resp 16  Wt 183 lb (83 kg)  SpO2 97%  Breastfeeding? No  BMI 31.91 kg/m2 Estimated body mass index is 31.91 kg/(m^2) as calculated from the following:    Height as of 7/8/15: 5' 3.5\" (1.613 m).    Weight as of this encounter: 183 lb (83 kg).  Medication Reconciliation: complete  John Dover MA    "

## 2018-11-06 NOTE — MR AVS SNAPSHOT
After Visit Summary   11/6/2018    Molly Ospina    MRN: [id number]           Patient Information     Date Of Birth          1950        Visit Information        Provider Department      11/6/2018 1:40 PM Brittany Dent MD [facility]        Today's Diagnoses     Lip swelling    -  1    Nasal congestion           Follow-ups after your visit        Additional Services     ALLERGY/ASTHMA ADULT REFERRAL       Your provider has referred you to: G: [facility]  [phone] [email or web address]    Please be aware that coverage of these services is subject to the terms and limitations of your health insurance plan.  Call member services at your health plan with any benefit or coverage questions.      Please bring the following with you to your appointment:    (1) Any X-Rays, CTs or MRIs which have been performed.  Contact the facility where they were done to arrange for  prior to your scheduled appointment.    (2) List of current medications  (3) This referral request   (4) Any documents/labs given to you for this referral                  Follow-up notes from your care team     Return in about 1 week (around 11/13/2018) for allergiest.      Who to contact     If you have questions or need follow up information about today's clinic visit or your schedule please contact [facility] directly at [phone].  Normal or non-critical lab and imaging results will be communicated to you by MyChart, letter or phone within 4 business days after the clinic has received the results. If you do not hear from us within 7 days, please contact the clinic through MyChart or phone. If you have a critical or abnormal lab result, we will notify you by phone as soon as possible.  Submit refill requests through [employer] or call your pharmacy and they will forward the refill request to us. Please allow 3 business days for your refill to be  completed.          Additional Information About Your Visit        [employer] Information     "[employer]" gives you secure access to your electronic health record. If you see a primary care provider, you can also send messages to your care team and make appointments. If you have questions, please call your primary care clinic.  If you do not have a primary care provider, please call [phone] and they will assist you.        Care EveryWhere ID     This is your Care EveryWhere ID. This could be used by other organizations to access your [facility] medical records  [id number]        Your Vitals Were     Pulse Temperature Respirations Pulse Oximetry Breastfeeding? BMI (Body Mass Index)    98 97.5  F (36.4  C) (Oral) 16 97% No 31.91 kg/m2       Blood Pressure from Last 3 Encounters:   11/06/18 138/74   07/19/18 109/60   01/17/18 129/66    Weight from Last 3 Encounters:   11/06/18 183 lb (83 kg)   07/19/18 182 lb (82.6 kg)   01/17/18 182 lb (82.6 kg)              We Performed the Following     ALLERGY/ASTHMA ADULT REFERRAL          Today's Medication Changes          These changes are accurate as of 11/6/18  2:22 PM.  If you have any questions, ask your nurse or doctor.               Start taking these medicines.        Dose/Directions    EPINEPHrine 0.3 MG/0.3ML injection 2-pack   Commonly known as:  EPIPEN/ADRENACLICK/or ANY BX GENERIC EQUIV   Used for:  Lip swelling   Started by:  Brittany Dent MD        Dose:  0.3 mg   Inject 0.3 mLs (0.3 mg) into the muscle as needed for anaphylaxis   Quantity:  0.6 mL   Refills:  0            Where to get your medicines      These medications were sent to [place] [address] [facility] [place] - [address] [place], [place] [address] [place] [address]     Phone:  [phone]     EPINEPHrine 0.3 MG/0.3ML injection 2-pack                Primary Care Provider Office Phone # Fax #    Gil Dumas PA-C [phone] [address] [address]  [facility] [address]         Equal Access to Services     [facility] Hospital[facility]HAELA : Hadii aad ku hadalbertbushra Jamariali, wapjda luqadaha, qaybta kaimtiazreymundo lin. So [facility] [phone].    ATENCIÓN: Si habla palma, tiene a ruelas disposición servicios gratuitos de asistencia lingüística. Flo al [phone].    We comply with applicable federal civil rights laws and [place] laws. We do not discriminate on the basis of race, color, national origin, age, disability, sex, sexual orientation, or gender identity.            Thank you!     Thank you for choosing [facility]  [facility] your [facility]. Our goal is always to provide you with excellent care. Hearing back from our patients is one way we can continue to improve our services. Please take a few minutes to complete the written survey that you may receive in the mail after your visit with us. Thank you!             Your Updated Medication List - Protect others around you: Learn how to safely use, store and throw away your medicines at www.disposemymeds.org.          This list is accurate as of 11/6/18  2:22 PM.  Always use your most recent med list.                   Brand Name Dispense Instructions for use Diagnosis    aspirin 325 MG EC tablet      Take 325 mg by mouth daily.        EPINEPHrine 0.3 MG/0.3ML injection 2-pack    EPIPEN/ADRENACLICK/or ANY BX GENERIC EQUIV    0.6 mL    Inject 0.3 mLs (0.3 mg) into the muscle as needed for anaphylaxis    Lip swelling       FISH OIL PO      1 tablet daily    HTN (hypertension)       hydrochlorothiazide 25 MG tablet    HYDRODIURIL    90 tablet    TAKE 1 TABLET EVERY MORNING    Benign essential hypertension       levothyroxine 75 MCG tablet    SYNTHROID/LEVOTHROID    90 tablet    Take 1 tablet (75 mcg) by mouth daily    Hypothyroidism, unspecified type       lisinopril 10 MG tablet    PRINIVIL/ZESTRIL    90 tablet    Take 1 tablet (10 mg) by mouth daily    Benign essential hypertension       metoprolol succinate 50 MG  24 hr tablet    TOPROL-XL    90 tablet    Take 1 tablet (50 mg) by mouth daily    Benign essential hypertension       MULTIVITAMIN PO           NIACIN PO           rosuvastatin 10 MG tablet    CRESTOR    90 tablet    Take 1 tablet (10 mg) by mouth daily    High triglycerides       timolol 0.5 % ophthalmic gel-form    TIMOPTIC-XE          TYLENOL 500 MG tablet   Generic drug:  acetaminophen      Take 1-2 tablets by mouth every 6 hours as needed.        venlafaxine 37.5 MG 24 hr capsule    EFFEXOR-XR    90 capsule    Take 1 capsule (37.5 mg) by mouth daily    Major depressive disorder, recurrent episode, mild (H)       XIIDRA 5 % opthalmic solution   Generic drug:  lifitegrast

## 2018-11-06 NOTE — PROGRESS NOTES
SUBJECTIVE:  Molly Ospina is an 67 year old female who presents for episode of abdominal cramping followed by watery diarrhea.  Felt mouth was dry and thick after this and felt like her head was hot.  When looked in mirror last night with this episode she thinks her lips looked puffy.   No lightheadedness or dizziness.  No cp or shortness of breath.  No numbness or tingling in hands or feet.  Felt flushed and thinks she looked a little red.  Today has mild frontal headache which she thinks is due to some sinus congestion, she saw chiro yesterday for sinuses.  Had a couple other episodes of this over the past couple months, about a month apart, which lasted about 20 minutes and then went away, and she thought they were due to something she ate.  No h/o food allergies or foods that upset her stomach.  No new meds or changes to her meds over past few months.  Some itching of eyes.      PMH:   has a past medical history of Actinic keratosis; AK (actinic keratosis) (10/10/2012); Anxiety state, unspecified; High triglycerides; HTN; Mild major depression (H); PFO (patent foramen ovale); and Unspecified hypothyroidism.  Patient Active Problem List   Diagnosis     High triglycerides     Anxiety state     Mild major depression (H)     CARDIOVASCULAR SCREENING; LDL GOAL LESS THAN 130     Hyperhidrosis of face     Advanced directives, counseling/discussion     Obesity     AK (actinic keratosis)     Hypothyroidism, unspecified type     Benign essential hypertension     High blood cholesterol     Major depressive disorder, recurrent episode, mild (H)     Seborrheic keratoses     Social History     Social History     Marital status:      Spouse name: N/A     Number of children: N/A     Years of education: N/A     Occupational History      Retired     Social History Main Topics     Smoking status: Former Smoker     Quit date: 1/1/1985     Smokeless tobacco: Former User     Alcohol use Yes      Comment: OCCASIONAL      Drug use: No     Sexual activity: Yes     Partners: Male     Other Topics Concern      Service No     Blood Transfusions No     Caffeine Concern No     Occupational Exposure No     Hobby Hazards No     Sleep Concern No     Stress Concern No     Weight Concern No     Special Diet No     Back Care No     Exercise No     Bike Helmet No     Seat Belt No     Self-Exams No     Social History Narrative     Family History   Problem Relation Age of Onset     Breast Cancer Mother      HEART DISEASE Mother      Lipids Mother      Osteoporosis Mother      Hypertension Mother      C.A.D. Mother      Cancer Mother      melanoma on the leg     HEART DISEASE Father      Lipids Father      Thyroid Disease Father      Hypertension Father      C.A.D. Father      Cardiovascular Father      Cancer Father      arm skin cancer     Respiratory Father      pulmonary fibrosis - on O2     Breast Cancer Maternal Grandmother      Alzheimer Disease Maternal Grandmother      Diabetes Maternal Grandmother      HEART DISEASE Maternal Grandfather      Cancer - colorectal Paternal Grandmother      HEART DISEASE Paternal Grandfather      Lipids Son      Lipids Son      Diabetes Brother        ALLERGIES:  Review of patient's allergies indicates no known allergies.    Current Outpatient Prescriptions   Medication     acetaminophen (TYLENOL) 500 MG tablet     aspirin 325 MG EC tablet     FISH OIL OR     hydrochlorothiazide (HYDRODIURIL) 25 MG tablet     levothyroxine (SYNTHROID/LEVOTHROID) 75 MCG tablet     lisinopril (PRINIVIL/ZESTRIL) 10 MG tablet     metoprolol succinate (TOPROL-XL) 50 MG 24 hr tablet     Multiple Vitamins-Minerals (MULTIVITAMIN PO)     NIACIN PO     rosuvastatin (CRESTOR) 10 MG tablet     timolol (TIMOPTIC-XE) 0.5 % ophthalmic gel-form     venlafaxine (EFFEXOR-XR) 37.5 MG 24 hr capsule     XIIDRA 5 % SOLN opthalmic solution     No current facility-administered medications for this visit.          ROS:  ROS is done and is  negative for general/constitutional, eye, ENT, Respiratory, cardiovascular, GI, , Skin, musculoskeletal except as noted elsewhere.  All other review of systems negative except as noted elsewhere.      OBJECTIVE:  /74  Pulse 98  Temp 97.5  F (36.4  C) (Oral)  Resp 16  Wt 183 lb (83 kg)  SpO2 97%  Breastfeeding? No  BMI 31.91 kg/m2  GENERAL APPEARANCE: Alert, in no acute distress  EYES: normal  EARS: External ears normal. Canals clear. TM's normal.  NOSE:mild clear discharge and pale  OROPHARYNX:normal  SINUSES: non-tender  NECK:No adenopathy,masses or thyromegaly  RESP: normal and clear to auscultation  CV:regular rate and rhythm and no murmurs, clicks, or gallops  ABDOMEN: Abdomen soft, non-tender. BS normal. No masses, organomegaly  SKIN: no ulcers, lesions or rash  MUSCULOSKELETAL:Musculoskeletal normal      RESULTS  .  No results found for this or any previous visit (from the past 48 hour(s)).    ASSESSMENT/PLAN:    ASSESSMENT / PLAN:  (R22.0) Lip swelling  (primary encounter diagnosis)  Comment: resolved.  Has had a couple episodes of abd cramping with diarrhea in past, but never with lip swelling like this before.  Has resolved, but raises question of allergic reaction.  No new meds or dosage changes.  Plan: ALLERGY/ASTHMA ADULT REFERRAL, EPINEPHrine         (EPIPEN/ADRENACLICK/OR ANY BX GENERIC EQUIV)         0.3 MG/0.3ML injection 2-pack        Will rx epipen to use if needed for severe swelling or any compromise of breathing.  Referred to allergist for further work up.   I reviewed supportive care, expected course, and signs of concern.   Reviewed red flag symptoms and is to call 911 and go to the ER if experiences any of these.    (R09.81) Nasal congestion  Comment: suspect allergies  Plan: flonase otc prn. Reviewed medication instructions and side effects. Follow up if experiences side effects.. I reviewed supportive care, expected course, and signs of concern.  Follow up as needed or if she  does not improve within 7 day(s) or if worsens in any way.  Reviewed red flag symptoms and is to go to the ER if experiences any of these.    (I10) Benign essential hypertension  Comment: pt reports BP was 160 systolic when lips were swelling, which she attributes to the episode and feeling worried about what was happening.  BP acceptable toady  Plan: continue meds and routine f/u with pcp.    See [facility] for orders, medications, letters, patient instructions    Brittany Dent M.D.

## 2018-11-16 ENCOUNTER — OFFICE VISIT (OUTPATIENT)
Dept: ALLERGY | Facility: CLINIC | Age: 68
End: 2018-11-16
Payer: MEDICARE

## 2018-11-16 VITALS
BODY MASS INDEX: 31.58 KG/M2 | RESPIRATION RATE: 14 BRPM | OXYGEN SATURATION: 99 % | HEIGHT: 64 IN | HEART RATE: 64 BPM | TEMPERATURE: 97.2 F | WEIGHT: 185 LBS | SYSTOLIC BLOOD PRESSURE: 147 MMHG | DIASTOLIC BLOOD PRESSURE: 70 MMHG

## 2018-11-16 DIAGNOSIS — R63.5 WEIGHT GAIN: ICD-10-CM

## 2018-11-16 DIAGNOSIS — J30.89 ALLERGIC RHINITIS DUE TO OTHER ALLERGIC TRIGGER, UNSPECIFIED SEASONALITY: ICD-10-CM

## 2018-11-16 DIAGNOSIS — I10 BENIGN ESSENTIAL HYPERTENSION: ICD-10-CM

## 2018-11-16 DIAGNOSIS — T78.2XXD IDIOPATHIC ANAPHYLACTIC REACTION, SUBSEQUENT ENCOUNTER: Primary | ICD-10-CM

## 2018-11-16 LAB
BASOPHILS # BLD AUTO: 0.1 10E9/L (ref 0–0.2)
BASOPHILS NFR BLD AUTO: 0.8 %
DIFFERENTIAL METHOD BLD: NORMAL
EOSINOPHIL # BLD AUTO: 0.2 10E9/L (ref 0–0.7)
EOSINOPHIL NFR BLD AUTO: 2.9 %
ERYTHROCYTE [DISTWIDTH] IN BLOOD BY AUTOMATED COUNT: 13.1 % (ref 10–15)
HCT VFR BLD AUTO: 39.9 % (ref 35–47)
HGB BLD-MCNC: 13.5 G/DL (ref 11.7–15.7)
LYMPHOCYTES # BLD AUTO: 2.8 10E9/L (ref 0.8–5.3)
LYMPHOCYTES NFR BLD AUTO: 35.3 %
MCH RBC QN AUTO: 32.2 PG (ref 26.5–33)
MCHC RBC AUTO-ENTMCNC: 33.8 G/DL (ref 31.5–36.5)
MCV RBC AUTO: 95 FL (ref 78–100)
MONOCYTES # BLD AUTO: 0.8 10E9/L (ref 0–1.3)
MONOCYTES NFR BLD AUTO: 9.9 %
NEUTROPHILS # BLD AUTO: 4.1 10E9/L (ref 1.6–8.3)
NEUTROPHILS NFR BLD AUTO: 51.1 %
PLATELET # BLD AUTO: 298 10E9/L (ref 150–450)
RBC # BLD AUTO: 4.19 10E12/L (ref 3.8–5.2)
TSH SERPL DL<=0.005 MIU/L-ACNC: 3.45 MU/L (ref 0.4–4)
WBC # BLD AUTO: 8 10E9/L (ref 4–11)

## 2018-11-16 PROCEDURE — 86003 ALLG SPEC IGE CRUDE XTRC EA: CPT | Performed by: ALLERGY & IMMUNOLOGY

## 2018-11-16 PROCEDURE — 84443 ASSAY THYROID STIM HORMONE: CPT | Performed by: ALLERGY & IMMUNOLOGY

## 2018-11-16 PROCEDURE — 86003 ALLG SPEC IGE CRUDE XTRC EA: CPT | Mod: 90 | Performed by: ALLERGY & IMMUNOLOGY

## 2018-11-16 PROCEDURE — 99000 SPECIMEN HANDLING OFFICE-LAB: CPT | Performed by: ALLERGY & IMMUNOLOGY

## 2018-11-16 PROCEDURE — 36415 COLL VENOUS BLD VENIPUNCTURE: CPT | Performed by: ALLERGY & IMMUNOLOGY

## 2018-11-16 PROCEDURE — 83520 IMMUNOASSAY QUANT NOS NONAB: CPT | Performed by: ALLERGY & IMMUNOLOGY

## 2018-11-16 PROCEDURE — 99204 OFFICE O/P NEW MOD 45 MIN: CPT | Performed by: ALLERGY & IMMUNOLOGY

## 2018-11-16 PROCEDURE — 85025 COMPLETE CBC W/AUTO DIFF WBC: CPT | Performed by: ALLERGY & IMMUNOLOGY

## 2018-11-16 NOTE — LETTER
11/16/2018         RE: Molly Ospina  [facility] [id number]        Dear Colleague,    Thank you for referring your patient, Molly Ospina, to the [facility]. Please see a copy of my visit note below.    Molly Ospina is a 68 year old White female with previous medical history significant for hypothyroidism, hypertension, hyperlipidemia. Molly Ospina is being seen today for evaluation of seasonal allergies and anaphylaxis. The patient is being seen in consultation at the request of Dr. Brittany Dent MD.     The patient over the course of the last year has had 4 episodes in which she has developed systemic symptoms.  Most recent episode occurred in the evening after leaving the chiropractor's office.  She had consumed a wintergreen candy and 30 minutes later she was at home in her basement and developed abdominal cramping, diarrhea, increased blood pressure, hot, itchy hands, face and chest flushing.  She additionally developed swelling of her upper and lower lip and a dry throat.  Symptoms persisted 15-20 minutes and then began to improve.  She was back to normal within 30 minutes.  She did not eat anything else prior to this.  The other 3 episodes have occurred after consuming a grapefruit beer and fried rice at a nursing home.  She had another episode after eating a meal but she does not recall the meal.  She does not recall any food allergies to common allergens such as milk, wheat, egg, peanuts, tree nuts, shellfish or fish.  She is on numerous medications but there have been no medication changes or increase in dosage.  She does not remember taking NSAIDs prior to these episodes.  No association with insect stings.  She denies any new soaps, shampoos, lotions.  She now has injectable epinephrine but has not had to use.  She has had recent weight gain.  She has hypothyroidism.  She is on thyroid hormone replacement.  Patient has perennial ocular itching and  redness.  She has ocular watering.  She has glaucoma.  She is on timolol.  She has fall postnasal drainage, rhinorrhea and ocular itching.  Oral antihistamine has been somewhat beneficial.    The patient has no history of asthma, eczema, food allergies, medications allergies or hives.     ENVIRONMENTAL HISTORY: [place] The home is heated with a forced air. They do have central air conditioning. The patient's bedroom is furnished with hard inocencio in bedroom.  Pets inside the house include 0 . There is not history of cockroach or mice infestation. There is/are 0 smokers in the house.  The house does not have a damp basement.     Past Medical History:   Diagnosis Date     Actinic keratosis      AK (actinic keratosis) 10/10/2012     Anxiety state, unspecified      High triglycerides      HTN      Mild major depression (H)      PFO (patent foramen ovale)      Unspecified hypothyroidism      Family History   Problem Relation Age of Onset     Breast Cancer Mother      HEART DISEASE Mother      Lipids Mother      Osteoporosis Mother      Hypertension Mother      C.A.D. Mother      Cancer Mother      melanoma on the leg     HEART DISEASE Father      Lipids Father      Thyroid Disease Father      Hypertension Father      C.A.D. Father      Cardiovascular Father      Cancer Father      arm skin cancer     Respiratory Father      pulmonary fibrosis - on O2     Breast Cancer Maternal Grandmother      Alzheimer Disease Maternal Grandmother      Diabetes Maternal Grandmother      HEART DISEASE Maternal Grandfather      Cancer - colorectal Paternal Grandmother      HEART DISEASE Paternal Grandfather      Lipids Son      Lipids Son      Diabetes Brother      Past Surgical History:   Procedure Laterality Date     HYSTERECTOMY, SEGUNDO       LASIK       TONSILLECTOMY & ADENOIDECTOMY         REVIEW OF SYSTEMS:  General: negative for weight gain. negative for weight loss. negative for  changes in sleep.   Ears: negative for fullness. negative for hearing loss. negative for dizziness.   Nose: negative for snoring.negative for changes in smell. positive  for drainage.   Eyes: positive  for eye watering. positive  for eye itching. negative for vision changes. positive  for eye redness.  Throat: negative for hoarseness. negative for sore throat. negative for trouble swallowing.   Lungs: negative for shortness of breath.negative for wheezing. negative for sputum production.   Cardiovascular: negative for chest pain. negative for swelling of ankles. negative for fast or irregular heartbeat.   Gastrointestinal: negative for nausea. negative for heartburn. negative for acid reflux.   Musculoskeletal: negative for joint pain. negative for joint stiffness. negative for joint swelling.   Neurologic: negative for seizures. negative for fainting. negative for weakness.   Psychiatric: negative for changes in mood. positive  for anxiety.   Endocrine: negative for cold intolerance. negative for heat intolerance. negative for tremors.   Lymphatic: negative for lower extremity swelling. negative for lymph node swelling.   Hematologic: negative for easy bruising. negative for easy bleeding.  Integumentary: negative for rash. negative for scaling. negative for nail changes.       Current Outpatient Prescriptions:      acetaminophen (TYLENOL) 500 MG tablet, Take 1-2 tablets by mouth every 6 hours as needed., Disp: , Rfl:      aspirin 325 MG EC tablet, Take 325 mg by mouth daily., Disp: , Rfl:      EPINEPHrine (EPIPEN/ADRENACLICK/OR ANY BX GENERIC EQUIV) 0.3 MG/0.3ML injection 2-pack, Inject 0.3 mLs (0.3 mg) into the muscle as needed for anaphylaxis, Disp: 0.6 mL, Rfl: 0     FISH OIL OR, 1 tablet daily, Disp: , Rfl:      hydrochlorothiazide (HYDRODIURIL) 25 MG tablet, TAKE 1 TABLET EVERY MORNING, Disp: 90 tablet, Rfl: 1     levothyroxine (SYNTHROID/LEVOTHROID) 75 MCG tablet, Take 1 tablet (75 mcg) by mouth daily, Disp:  90 tablet, Rfl: 1     lisinopril (PRINIVIL/ZESTRIL) 10 MG tablet, Take 1 tablet (10 mg) by mouth daily, Disp: 90 tablet, Rfl: 1     metoprolol succinate (TOPROL-XL) 50 MG 24 hr tablet, Take 1 tablet (50 mg) by mouth daily, Disp: 90 tablet, Rfl: 1     Multiple Vitamins-Minerals (MULTIVITAMIN PO), , Disp: , Rfl:      NIACIN PO, , Disp: , Rfl:      rosuvastatin (CRESTOR) 10 MG tablet, Take 1 tablet (10 mg) by mouth daily, Disp: 90 tablet, Rfl: 3     timolol (TIMOPTIC-XE) 0.5 % ophthalmic gel-form, , Disp: , Rfl:      venlafaxine (EFFEXOR-XR) 37.5 MG 24 hr capsule, Take 1 capsule (37.5 mg) by mouth daily, Disp: 90 capsule, Rfl: 1     XIIDRA 5 % SOLN opthalmic solution, , Disp: , Rfl:   Immunization History   Administered Date(s) Administered     TD (ADULT, 7+) 01/11/1999     TDAP Vaccine (Adacel) 03/16/2011     Zoster vaccine recombinant adjuvanted (SHINGRIX) 07/30/2018, 10/10/2018     No Known Allergies      EXAM:   Constitutional:  Appears well-developed and well-nourished. No distress.   HEENT:   Head: Normocephalic.   Mouth/Throat:   No cobblestoning of posterior oropharynx.   Nasal tissue pink and normal appearing.  No rhinorrhea noted.    Eyes: Conjunctivae are erythematous. Ocular watering noted.   No maxillary or frontal sinus tenderness to palpation.   Cardiovascular: Normal rate, regular rhythm and normal heart sounds. Exam reveals no gallop and no friction rub.   No murmur heard.  Respiratory: Effort normal and breath sounds normal. No respiratory distress. No wheezes. No rales.   Musculoskeletal: Normal range of motion.   Lymphadenopathy:   No cervical adenopathy.   No lower extremity edema.   Neuro: Oriented to person, place, and time.  Skin: Skin is warm and dry. No rash noted.   Psychiatric: Normal mood and affect.     Nursing note and vitals reviewed.    ASSESSMENT/PLAN:  Problem List Items Addressed This Visit        Respiratory    Allergic rhinitis due to other allergic trigger, unspecified seasonality      Perennial ocular redness and watering.  Fall rhinorrhea, postnasal drainage and ocular itching.  Oral antihistamines have been beneficial.    - Serum IgE for environmental allergens.  -Zyrtec 10 mg by mouth daily.         Relevant Orders    Allergen cat epithellium IgE (Completed)    Allergen dog epithelium IgE (Completed)    Allergen Dandre grass IgE (Completed)    Allergen noelle IgE (Completed)    Allergen D pteronyssinus IgE (Completed)    Allergen D farinae IgE (Completed)    Allergen aspergillus fumigatus IgE (Completed)    Allergen cladosporium herbarum IgE (Completed)    Allergen alternaria alternata IgE (Completed)    Allergen Epicoccum purpurascens IgE (Completed)    Allergen penicillium notatum IgE (Completed)    Allergen albert white IgE (Completed)    Allergen Mascotte IgE (Completed)    Allergen cottonwood IgE (Completed)    Allergen elm IgE (Completed)    Allergen maple box elder IgE (Completed)    Allergen oak white IgE (Completed)    Allergen white pine IgE (Completed)    Allergen [place] Tree (Completed)    Allergen Tree White [place] IgE (Completed)    Allergen silver  birch IgE (Completed)    Allergen Red [place] IgE (Completed)    Allergen Sagebrush [place] IgE (Completed)    Allergen Sheep Sorrel IgE (Completed)    Allergen thistle Russian IgE (Completed)    Allergen Weed Nettle IgE (Completed)    Allergen, Kochia/Firebush (Completed)    Allergen English plantain IgE (Completed)    Allergen giant ragweed IgE (Completed)    Allergen lamb's quarter IgE (Completed)    Allergen Mugwort IgE (Completed)    Allergen ragweed short IgE (Completed)       Circulatory    Benign essential hypertension     Patient to follow up with Primary Care provider regarding elevated blood pressure.              Other    Idiopathic anaphylactic reaction, subsequent encounter - Primary     Patient over the last year has had 4 episodes in which she has developed systemic symptoms.  Most recent episode occurred after  visiting the chiropractor and consuming a wintergreen mints.  Symptoms generally last 15-30 minutes and resolved without treatment.  Symptoms generally include diarrhea, itchy hands, flushing of face and chest and lip swelling.  Other episodes have occurred after drinking beer, fried rice and unknown.  She now has injectable epinephrine.    -Discussed with patient that unclear etiology of symptoms.  I doubt this is secondary to food allergies as each episode has occurred after completely unrelated food.   could be secondary to a spice or preservative.  Regardless she does not have any ingredient list for the foods that she has reacted to.  She will investigate this and provide this to us.  Additionally if she has any further episodes she will keep diary of potential causes.  She has tolerated all major food allergens without allergic reactions.  Presumed idiopathic at this point.  -Check serum tryptase.  -Anaphylaxis action plan was provided and reviewed with the patient.  Reviewed how and when to use injectable epinephrine.  -CBC with differential.  -TSH  -Start Zyrtec 10 mg by mouth daily.         Relevant Orders    Tryptase (Completed)    CBC with platelets differential (Completed)      Other Visit Diagnoses     Weight gain        Relevant Orders    TSH with free T4 reflex (Completed)        Return to clinic in 3 months.    Chart documentation with Dragon Voice recognition Software. Although reviewed after completion, some words and grammatical errors may remain.    Peter Voss,    Allergy/Immunology  [facility] [place] [facility]      Again, thank you for allowing me to participate in the care of your patient.        Sincerely,        Peter Voss, DO

## 2018-11-16 NOTE — ASSESSMENT & PLAN NOTE
Patient over the last year has had 4 episodes in which she has developed systemic symptoms.  Most recent episode occurred after visiting the chiropractor and consuming a wintergreen mints.  Symptoms generally last 15-30 minutes and resolved without treatment.  Symptoms generally include diarrhea, itchy hands, flushing of face and chest and lip swelling.  Other episodes have occurred after drinking beer, fried rice and unknown.  She now has injectable epinephrine.    -Discussed with patient that unclear etiology of symptoms.  I doubt this is secondary to food allergies as each episode has occurred after completely unrelated food.   could be secondary to a spice or preservative.  Regardless she does not have any ingredient list for the foods that she has reacted to.  She will investigate this and provide this to us.  Additionally if she has any further episodes she will keep diary of potential causes.  She has tolerated all major food allergens without allergic reactions.  Presumed idiopathic at this point.  -Check serum tryptase.  -Anaphylaxis action plan was provided and reviewed with the patient.  Reviewed how and when to use injectable epinephrine.  -CBC with differential.  -TSH  -Start Zyrtec 10 mg by mouth daily.

## 2018-11-16 NOTE — NURSING NOTE
RN reviewed Anaphylaxis Action Plan with patient. Educated on the symptoms and treatment of anaphylaxis. Went through the different ways that a reaction can present, and the body systems that it can affect. Patient verbalized understanding.     Corinne Newby RN

## 2018-11-16 NOTE — LETTER
[facility]                   FOOD ALLERGY & ANAPHYLAXIS EMERGENCY CARE PLAN  Food Allergy Research & Education         Name: Molly Quilesstas SORIANO.:  [id number]    Allergy to: Idiopathic  Weight: 185 lbs 0 oz lbs.  Asthma:  No    -NOTE: Do not depend on antihistamines or inhalers (bronchodilators) to treat a severe reaction. USE EPINEPHRINE.     MEDICATIONS/DOSES  Epinephrine Brand: EpiPen  Epinephrine Dose: 0.3 mg IM  Antihistamine Brand or Generic: Zyrtec (Cetirizine)  Antihistamine Dose: 10mg         FARE                   FOOD ALLERGY & ANAPHYLAXIS EMERGENCY CARE PLAN   Food Allergy Research [employer] Education           EMERGENCY CONTACTS - CALL 911  DOCTOR:  Peter Voss DO   PHONE: [phone]  PARENT/GUARDIAN:              PHONE:  OTHER EMERGENCY CONTACTS  NAME/RELATIONSHIP:   PHONE:   NAME/RELATIONSHIP:    PHONE:           PARENT/GUARDIAN AUTHORIZATION SIGNATURE     DATE              PHYSICIAN/H CP AUTHORIZATION SIGNATURE         DATE  FORM PROVIDED [employer] OF FOOD ALLERGY RESEARCH & EDUCATION (FARE) ([email or web address]) 2014

## 2018-11-16 NOTE — PROGRESS NOTES
Molly Ospina is a 68 year old White female with previous medical history significant for hypothyroidism, hypertension, hyperlipidemia. Molly Ospina is being seen today for evaluation of seasonal allergies and anaphylaxis. The patient is being seen in consultation at the request of Dr. Brittany Dent MD.     The patient over the course of the last year has had 4 episodes in which she has developed systemic symptoms.  Most recent episode occurred in the evening after leaving the chiropractor's office.  She had consumed a wintergreen candy and 30 minutes later she was at home in her basement and developed abdominal cramping, diarrhea, increased blood pressure, hot, itchy hands, face and chest flushing.  She additionally developed swelling of her upper and lower lip and a dry throat.  Symptoms persisted 15-20 minutes and then began to improve.  She was back to normal within 30 minutes.  She did not eat anything else prior to this.  The other 3 episodes have occurred after consuming a grapefruit beer and fried rice at a nursing home.  She had another episode after eating a meal but she does not recall the meal.  She does not recall any food allergies to common allergens such as milk, wheat, egg, peanuts, tree nuts, shellfish or fish.  She is on numerous medications but there have been no medication changes or increase in dosage.  She does not remember taking NSAIDs prior to these episodes.  No association with insect stings.  She denies any new soaps, shampoos, lotions.  She now has injectable epinephrine but has not had to use.  She has had recent weight gain.  She has hypothyroidism.  She is on thyroid hormone replacement.  Patient has perennial ocular itching and redness.  She has ocular watering.  She has glaucoma.  She is on timolol.  She has fall postnasal drainage, rhinorrhea and ocular itching.  Oral antihistamine has been somewhat beneficial.    The patient has no history of asthma, eczema, food allergies,  medications allergies or hives.     ENVIRONMENTAL HISTORY: [place] The home is heated with a forced air. They do have central air conditioning. The patient's bedroom is furnished with hard inocencio in bedroom.  Pets inside the house include 0 . There is not history of cockroach or mice infestation. There is/are 0 smokers in the house.  The house does not have a damp basement.     Past Medical History:   Diagnosis Date     Actinic keratosis      AK (actinic keratosis) 10/10/2012     Anxiety state, unspecified      High triglycerides      HTN      Mild major depression (H)      PFO (patent foramen ovale)      Unspecified hypothyroidism      Family History   Problem Relation Age of Onset     Breast Cancer Mother      HEART DISEASE Mother      Lipids Mother      Osteoporosis Mother      Hypertension Mother      C.A.D. Mother      Cancer Mother      melanoma on the leg     HEART DISEASE Father      Lipids Father      Thyroid Disease Father      Hypertension Father      C.A.D. Father      Cardiovascular Father      Cancer Father      arm skin cancer     Respiratory Father      pulmonary fibrosis - on O2     Breast Cancer Maternal Grandmother      Alzheimer Disease Maternal Grandmother      Diabetes Maternal Grandmother      HEART DISEASE Maternal Grandfather      Cancer - colorectal Paternal Grandmother      HEART DISEASE Paternal Grandfather      Lipids Son      Lipids Son      Diabetes Brother      Past Surgical History:   Procedure Laterality Date     HYSTERECTOMY, SEGUNDO       LASIK       TONSILLECTOMY & ADENOIDECTOMY         REVIEW OF SYSTEMS:  General: negative for weight gain. negative for weight loss. negative for changes in sleep.   Ears: negative for fullness. negative for hearing loss. negative for dizziness.   Nose: negative for snoring.negative for changes in smell. positive  for drainage.   Eyes: positive  for eye watering. positive  for eye itching. negative for  vision changes. positive  for eye redness.  Throat: negative for hoarseness. negative for sore throat. negative for trouble swallowing.   Lungs: negative for shortness of breath.negative for wheezing. negative for sputum production.   Cardiovascular: negative for chest pain. negative for swelling of ankles. negative for fast or irregular heartbeat.   Gastrointestinal: negative for nausea. negative for heartburn. negative for acid reflux.   Musculoskeletal: negative for joint pain. negative for joint stiffness. negative for joint swelling.   Neurologic: negative for seizures. negative for fainting. negative for weakness.   Psychiatric: negative for changes in mood. positive  for anxiety.   Endocrine: negative for cold intolerance. negative for heat intolerance. negative for tremors.   Lymphatic: negative for lower extremity swelling. negative for lymph node swelling.   Hematologic: negative for easy bruising. negative for easy bleeding.  Integumentary: negative for rash. negative for scaling. negative for nail changes.       Current Outpatient Prescriptions:      acetaminophen (TYLENOL) 500 MG tablet, Take 1-2 tablets by mouth every 6 hours as needed., Disp: , Rfl:      aspirin 325 MG EC tablet, Take 325 mg by mouth daily., Disp: , Rfl:      EPINEPHrine (EPIPEN/ADRENACLICK/OR ANY BX GENERIC EQUIV) 0.3 MG/0.3ML injection 2-pack, Inject 0.3 mLs (0.3 mg) into the muscle as needed for anaphylaxis, Disp: 0.6 mL, Rfl: 0     FISH OIL OR, 1 tablet daily, Disp: , Rfl:      hydrochlorothiazide (HYDRODIURIL) 25 MG tablet, TAKE 1 TABLET EVERY MORNING, Disp: 90 tablet, Rfl: 1     levothyroxine (SYNTHROID/LEVOTHROID) 75 MCG tablet, Take 1 tablet (75 mcg) by mouth daily, Disp: 90 tablet, Rfl: 1     lisinopril (PRINIVIL/ZESTRIL) 10 MG tablet, Take 1 tablet (10 mg) by mouth daily, Disp: 90 tablet, Rfl: 1     metoprolol succinate (TOPROL-XL) 50 MG 24 hr tablet, Take 1 tablet (50 mg) by mouth daily, Disp: 90 tablet, Rfl: 1     Multiple  Vitamins-Minerals (MULTIVITAMIN PO), , Disp: , Rfl:      NIACIN PO, , Disp: , Rfl:      rosuvastatin (CRESTOR) 10 MG tablet, Take 1 tablet (10 mg) by mouth daily, Disp: 90 tablet, Rfl: 3     timolol (TIMOPTIC-XE) 0.5 % ophthalmic gel-form, , Disp: , Rfl:      venlafaxine (EFFEXOR-XR) 37.5 MG 24 hr capsule, Take 1 capsule (37.5 mg) by mouth daily, Disp: 90 capsule, Rfl: 1     XIIDRA 5 % SOLN opthalmic solution, , Disp: , Rfl:   Immunization History   Administered Date(s) Administered     TD (ADULT, 7+) 01/11/1999     TDAP Vaccine (Adacel) 03/16/2011     Zoster vaccine recombinant adjuvanted (SHINGRIX) 07/30/2018, 10/10/2018     No Known Allergies      EXAM:   Constitutional:  Appears well-developed and well-nourished. No distress.   HEENT:   Head: Normocephalic.   Mouth/Throat:   No cobblestoning of posterior oropharynx.   Nasal tissue pink and normal appearing.  No rhinorrhea noted.    Eyes: Conjunctivae are erythematous. Ocular watering noted.   No maxillary or frontal sinus tenderness to palpation.   Cardiovascular: Normal rate, regular rhythm and normal heart sounds. Exam reveals no gallop and no friction rub.   No murmur heard.  Respiratory: Effort normal and breath sounds normal. No respiratory distress. No wheezes. No rales.   Musculoskeletal: Normal range of motion.   Lymphadenopathy:   No cervical adenopathy.   No lower extremity edema.   Neuro: Oriented to person, place, and time.  Skin: Skin is warm and dry. No rash noted.   Psychiatric: Normal mood and affect.     Nursing note and vitals reviewed.    ASSESSMENT/PLAN:  Problem List Items Addressed This Visit        Respiratory    Allergic rhinitis due to other allergic trigger, unspecified seasonality     Perennial ocular redness and watering.  Fall rhinorrhea, postnasal drainage and ocular itching.  Oral antihistamines have been beneficial.    - Serum IgE for environmental allergens.  -Zyrtec 10 mg by mouth daily.         Relevant Orders    Allergen cat  epithellium IgE (Completed)    Allergen dog epithelium IgE (Completed)    Allergen Dandre grass IgE (Completed)    Allergen noelle IgE (Completed)    Allergen D pteronyssinus IgE (Completed)    Allergen D farinae IgE (Completed)    Allergen aspergillus fumigatus IgE (Completed)    Allergen cladosporium herbarum IgE (Completed)    Allergen alternaria alternata IgE (Completed)    Allergen Epicoccum purpurascens IgE (Completed)    Allergen penicillium notatum IgE (Completed)    Allergen albert white IgE (Completed)    Allergen [place] IgE (Completed)    Allergen cottonwood IgE (Completed)    Allergen elm IgE (Completed)    Allergen maple box elder IgE (Completed)    Allergen oak white IgE (Completed)    Allergen white pine IgE (Completed)    Allergen [place] Tree (Completed)    Allergen Tree White [place] IgE (Completed)    Allergen silver  birch IgE (Completed)    Allergen Red [place] IgE (Completed)    Allergen Sagebrush Wormwood IgE (Completed)    Allergen Sheep Sorrel IgE (Completed)    Allergen thistle Russian IgE (Completed)    Allergen Weed Nettle IgE (Completed)    Allergen, Kochia/Firebush (Completed)    Allergen English plantain IgE (Completed)    Allergen giant ragweed IgE (Completed)    Allergen lamb's quarter IgE (Completed)    Allergen Mugwort IgE (Completed)    Allergen ragweed short IgE (Completed)       Circulatory    Benign essential hypertension     Patient to follow up with Primary Care provider regarding elevated blood pressure.              Other    Idiopathic anaphylactic reaction, subsequent encounter - Primary     Patient over the last year has had 4 episodes in which she has developed systemic symptoms.  Most recent episode occurred after visiting the chiropractor and consuming a wintergreen mints.  Symptoms generally last 15-30 minutes and resolved without treatment.  Symptoms generally include diarrhea, itchy hands, flushing of face and chest and lip swelling.  Other episodes have occurred  after drinking beer, fried rice and unknown.  She now has injectable epinephrine.    -Discussed with patient that unclear etiology of symptoms.  I doubt this is secondary to food allergies as each episode has occurred after completely unrelated food.   could be secondary to a spice or preservative.  Regardless she does not have any ingredient list for the foods that she has reacted to.  She will investigate this and provide this to us.  Additionally if she has any further episodes she will keep diary of potential causes.  She has tolerated all major food allergens without allergic reactions.  Presumed idiopathic at this point.  -Check serum tryptase.  -Anaphylaxis action plan was provided and reviewed with the patient.  Reviewed how and when to use injectable epinephrine.  -CBC with differential.  -TSH  -Start Zyrtec 10 mg by mouth daily.         Relevant Orders    Tryptase (Completed)    CBC with platelets differential (Completed)      Other Visit Diagnoses     Weight gain        Relevant Orders    TSH with free T4 reflex (Completed)        Return to clinic in 3 months.    Chart documentation with Dragon Voice recognition Software. Although reviewed after completion, some words and grammatical errors may remain.    Peter Voss DO   Allergy/Immunology  [facility], [place] and [place], [place]

## 2018-11-16 NOTE — PATIENT INSTRUCTIONS
Allergy Staff Appt Hours Shot Hours Locations    Physician     Peter Voss, DO       Support Staff     Corinne NUNEZ RN      Jenny NUNEZ, [facility]  Monday:                      [place] 8-7     Tuesday:         [place] 8-5     Wednesday:        [place]: 7-5     Friday:        [place] 7-5   [place]        Monday: 9-5:50        Wednesday: 2-5:50        Friday: 7-12:50     [place]        Tuesday: 7-10:50        Thursday: 1:30-6:30     [place] Monday: 7:10-4:50        Tuesday: 12:30-6:30        Thursday: 7-11:50 [facility]  [address] [place] [address]  Appt Line: [phone]  Allergy RN (Monday):  [phone]    [facility]  [address] [place] [address]  Appt Line: [phone]  Allergy RN (Tues & Wed):  [phone]    [facility]  [address] [place] [address]  Appt Line: [phone]  Allergy RN (Friday):  [phone]       Important Scheduling Information  Aspirin Desensitization: Appt will last 2 clinic days. Please call the Allergy RN line for your clinic to schedule. Discontinue antihistamines 7 days prior to the appointment.     Food Challenges: Appt will last 3-4 hours. Please call the Allergy RN line for your clinic to schedule. Discontinue antihistamines 7 days prior to the appointment.     Penicillin Testing: Appt will last 2-3 hours. Please call the Allergy RN line for your clinic to schedule. Discontinue antihistamines 7 days prior to the appointment.     Skin Testing: Appt will about 40 minutes. Call the appointment line for your clinic to schedule. Discontinue antihistamines 7 days prior to the appointment.     Venom Testing: Appt will last 2-3 hours. Please call the Allergy RN line for your clinic to schedule. Discontinue antihistamines 7 days prior to the appointment.     Thank you for trusting us with your Allergy, Asthma, and Immunology care. Please feel free to contact us with any questions or concerns you may have.      - Keep diary of  potential causes for anaphylaxis.   - Blood work today.   - See anaphylaxis action plan.   - Start Zyrtec 10mg by mouth daily.   - Return in 3 months. If you have another reaction please contact our office.

## 2018-11-16 NOTE — MR AVS SNAPSHOT
After Visit Summary   11/16/2018    Molly Ospina    MRN: [id number]           Patient Information     Date Of Birth          1950        Visit Information        Provider Department      11/16/2018 10:00 AM Peter Voss DO [facility]        Today's Diagnoses     Idiopathic anaphylactic reaction, subsequent encounter    -  1    Allergic rhinitis due to other allergic trigger, unspecified seasonality        Weight gain        Benign essential hypertension          Care Instructions    Allergy Staff Appt Hours Shot Hours Locations    Physician     Peter Voss DO       Support Staff     JENNIFER Macdonald, [facility]  Monday:                      [place] 8-7     Tuesday:         [place] 8-5     Wednesday:        [place]: 7-5     Friday:        [place] 7-5   [place]        Monday: 9-5:50        Wednesday: 2-5:50        Friday: 7-12:50     [place]        Tuesday: 7-10:50        Thursday: 1:30-6:30     [place] Monday: 7:10-4:50        Tuesday: 12:30-6:30        Thursday: 7-11:50 [facility]  [address] [place] [address]  Appt Line: [phone]  Allergy RN (Monday):  [phone]    [facility]  [address] [place] [address]  Appt Line: [phone]  Allergy RN (Tues & Wed):  [phone]    [facility]  [address] [place] [address]  Appt Line: [phone]  Allergy RN (Friday):  [phone]       Important Scheduling Information  Aspirin Desensitization: Appt will last 2 clinic days. Please call the Allergy RN line for your clinic to schedule. Discontinue antihistamines 7 days prior to the appointment.     Food Challenges: Appt will last 3-4 hours. Please call the Allergy RN line for your clinic to schedule. Discontinue antihistamines 7 days prior to the appointment.     Penicillin Testing: Appt will last 2-3 hours. Please call the Allergy RN line for your clinic to schedule. Discontinue antihistamines 7  days prior to the appointment.     Skin Testing: Appt will about 40 minutes. Call the appointment line for your clinic to schedule. Discontinue antihistamines 7 days prior to the appointment.     Venom Testing: Appt will last 2-3 hours. Please call the Allergy RN line for your clinic to schedule. Discontinue antihistamines 7 days prior to the appointment.     Thank you for trusting us with your Allergy, Asthma, and Immunology care. Please feel free to contact us with any questions or concerns you may have.      - Keep diary of potential causes for anaphylaxis.   - Blood work today.   - See anaphylaxis action plan.   - Start Zyrtec 10mg by mouth daily.   - Return in 3 months. If you have another reaction please contact our office.           Follow-ups after your visit        Who to contact     If you have questions or need follow up information about today's clinic visit or your schedule please contact [facility] directly at [phone].  Normal or non-critical lab and imaging results will be communicated to you by Spredfashionhart, letter or phone within 4 business days after the clinic has received the results. If you do not hear from us within 7 days, please contact the clinic through Spredfashionhart or phone. If you have a critical or abnormal lab result, we will notify you by phone as soon as possible.  Submit refill requests through [facility] or call your pharmacy and they will forward the refill request to us. Please allow 3 business days for your refill to be completed.          Additional Information About Your Visit        [facility] Information     [facility] gives you secure access to your electronic health record. If you see a primary care provider, you can also send messages to your care team and make appointments. If you have questions, please call your primary care clinic.  If you do not have a primary care provider, please call [phone] and they will assist you.        Care EveryWhere ID     This is your  "Care EveryWhere ID. This could be used by other organizations to access your [place] medical records  [id number]        Your Vitals Were     Pulse Temperature Respirations Height Pulse Oximetry BMI (Body Mass Index)    64 97.2  F (36.2  C) (Oral) 14 1.613 m (5' 3.5\") 99% 32.26 kg/m2       Blood Pressure from Last 3 Encounters:   11/16/18 147/70   11/06/18 138/74   07/19/18 109/60    Weight from Last 3 Encounters:   11/16/18 83.9 kg (185 lb)   11/06/18 83 kg (183 lb)   07/19/18 82.6 kg (182 lb)              We Performed the Following     Allergen alternaria alternata IgE     Allergen albert white IgE     Allergen aspergillus fumigatus IgE     Allergen cat epithellium IgE     Allergen Cedar IgE     Allergen cladosporium herbarum IgE     Allergen cottonwood IgE     Allergen D farinae IgE     Allergen D pteronyssinus IgE     Allergen dog epithelium IgE     Allergen elm IgE     Allergen English plantain IgE     Allergen Epicoccum purpurascens IgE     Allergen giant ragweed IgE     Allergen Dandre grass IgE     Allergen lamb's quarter IgE     Allergen maple box elder IgE     Allergen Mugwort IgE     Allergen oak white IgE     Allergen penicillium notatum IgE     Allergen ragweed short IgE     Allergen Red [place] IgE     Allergen Sagebrush Wormwood IgE     Allergen Sheep Sorrel IgE     Allergen silver  birch IgE     Allergen thistle Russian IgE     Allergen noelle IgE     Allergen Tree White [place] IgE     Allergen [place] Tree     Allergen Weed Nettle IgE     Allergen white pine IgE     Allergen, Kochia/Firebush     CBC with platelets differential     Tryptase     TSH with free T4 reflex        Primary Care Provider Office Phone # Fax #    Gil Dumas PA-C [phone] [address] [facility] [address]        Equal Access to Services     RASHID MONROY AH: Hadii dina ku hadasho Soomaali, waaxda luqadaha, qaybta kaalmada adeegyada, reymundo villegas ah. So wa " [phone].    ATENCIÓN: Si pasha waldrop, tiene a ruelas disposición servicios gratuitos de asistencia lingüística. Flo funk [phone].    We comply with applicable federal civil rights laws and [place] laws. We do not discriminate on the basis of race, color, national origin, age, disability, sex, sexual orientation, or gender identity.            Thank you!     Thank you for choosing [facility]  for your care. Our goal is always to provide you with excellent care. Hearing back from our patients is one way we can continue to improve our services. Please take a few minutes to complete the written survey that you may receive in the mail after your visit with us. Thank you!             Your Updated Medication List - Protect others around you: Learn how to safely use, store and throw away your medicines at www.disposemymeds.org.          This list is accurate as of 11/16/18 10:33 AM.  Always use your most recent med list.                   Brand Name Dispense Instructions for use Diagnosis    aspirin 325 MG EC tablet      Take 325 mg by mouth daily.        EPINEPHrine 0.3 MG/0.3ML injection 2-pack    EPIPEN/ADRENACLICK/or ANY BX GENERIC EQUIV    0.6 mL    Inject 0.3 mLs (0.3 mg) into the muscle as needed for anaphylaxis    Lip swelling       FISH OIL PO      1 tablet daily    HTN (hypertension)       hydrochlorothiazide 25 MG tablet    HYDRODIURIL    90 tablet    TAKE 1 TABLET EVERY MORNING    Benign essential hypertension       levothyroxine 75 MCG tablet    SYNTHROID/LEVOTHROID    90 tablet    Take 1 tablet (75 mcg) by mouth daily    Hypothyroidism, unspecified type       lisinopril 10 MG tablet    PRINIVIL/ZESTRIL    90 tablet    Take 1 tablet (10 mg) by mouth daily    Benign essential hypertension       metoprolol succinate 50 MG 24 hr tablet    TOPROL-XL    90 tablet    Take 1 tablet (50 mg) by mouth daily    Benign essential hypertension       MULTIVITAMIN PO           NIACIN PO            rosuvastatin 10 MG tablet    CRESTOR    90 tablet    Take 1 tablet (10 mg) by mouth daily    High triglycerides       timolol 0.5 % ophthalmic gel-form    TIMOPTIC-XE          TYLENOL 500 MG tablet   Generic drug:  acetaminophen      Take 1-2 tablets by mouth every 6 hours as needed.        venlafaxine 37.5 MG 24 hr capsule    EFFEXOR-XR    90 capsule    Take 1 capsule (37.5 mg) by mouth daily    Major depressive disorder, recurrent episode, mild (H)       XIIDRA 5 % opthalmic solution   Generic drug:  lifitegrast

## 2018-11-16 NOTE — ASSESSMENT & PLAN NOTE
Perennial ocular redness and watering.  Fall rhinorrhea, postnasal drainage and ocular itching.  Oral antihistamines have been beneficial.    - Serum IgE for environmental allergens.  -Zyrtec 10 mg by mouth daily.

## 2018-11-19 LAB
A FUMIGATUS IGE QN: <0.1 KU(A)/L
C HERBARUM IGE QN: <0.1 KU(A)/L
CALIF WALNUT IGE QN: <0.1 KU/L
CAT DANDER IGG QN: <0.1 KU(A)/L
CEDAR IGE QN: <0.1 KU(A)/L
COMMON RAGWEED IGE QN: 0.2 KU(A)/L
D FARINAE IGE QN: <0.1 KU(A)/L
D PTERONYSS IGE QN: <0.1 KU(A)/L
DEPRECATED MISC ALLERGEN IGE RAST QL: NORMAL
DOG DANDER+EPITH IGE QN: <0.1 KU(A)/L
E PURPURASCENS IGE QN: <0.1 KU(A)/L
EAST WHITE PINE IGE QN: <0.1 KU(A)/L
FIREBUSH IGE QN: <0.1 KU(A)/L
GIANT RAGWEED IGE QN: <0.1 KU(A)/L
JOHNSON GRASS IGE QN: <0.1 KU(A)/L
MAPLE IGE QN: 0.15 KU(A)/L
MUGWORT IGE QN: <0.1 KU(A)/L
SALTWORT IGE QN: <0.1 KU(A)/L
SHEEP SORREL IGE QN: <0.1 KU(A)/L
WHITE ASH IGE QN: <0.1 KU(A)/L
WHITE MULBERRY IGE QN: <0.1
WHITE OAK IGE QN: <0.1 KU(A)/L
WORMWOOD IGE QN: <0.1 KU(A)/L

## 2018-11-20 LAB
A ALTERNATA IGE QN: <0.1 KU(A)/L
COTTONWOOD IGE QN: <0.1 KU(A)/L
ENGL PLANTAIN IGE QN: <0.1 KU(A)/L
GOOSEFOOT IGE QN: <0.1 KU(A)/L
NETTLE IGE QN: <0.1 KU(A)/L
P NOTATUM IGE QN: <0.1 KU(A)/L
RED MULBERRY IGE QN: <0.1 KU(A)/L
SILVER BIRCH IGE QN: <0.1 KU(A)/L
TIMOTHY IGE QN: <0.1 KU(A)/L
TRYPTASE SERPL-MCNC: 9 UG/L
WHITE ELM IGE QN: <0.1 KU(A)/L

## 2018-11-20 NOTE — PROGRESS NOTES
Good afternoon, Testing positive for one tree and one weed. Could cause spring and fall nasal or chest symptoms. All other testing was normal. Continue treatment as discussed. See allergen avoidance measures below only for pollen. Thanks.     Dr. Voss    AEROALLERGEN AVOIDANCE INSTRUCTIONS  MOLD  Indoors, mold season is year round. Outdoors, most mold prefer seasons with high humidity. Mold prefers damp, dark, warm places. Here are some tips on how to avoid mold exposure.   Keep humidity inside between 35-50% with air conditioning or dehumidifier. The humidity level can be checked with a meter from a hardware store.    Clean surfaces where mold grows and dry wet areas.   Avoid steam cleaning carpets and discard moldy belongings.   Wear a mask when doing yard work and refrain from walking through uncut fields or playing in leaves.   Minimize use of potted plants and do not keep them indoors.   Consider an allergy cover for the pillow and mattress.  POLLEN  Pollens are the tiny airborne particles given off by trees, weeds, and grasses. They can be the cause of seasonal allergic rhinitis or hay fever symptoms, which include stuffy, itchy, runny nose, redness, swelling and itching of the eyes, and itching of the ears and throat. Here are some tips on how to avoid pollen exposure.  .Keep windows closed and use the air conditioner when possible.   Avoid outside exposure in the early morning as pollen counts are highest at that time.   Take a shower and wash hair each night.   Consider wearing a mask when working in the yard and/or garden.   Clean furnace filter monthly with HEPA filters. Consider a HEPA filter vacuum  which will prevent pollen from being reintroduced into the air.   DUST MITES  Dust mites can never be entirely eliminated in the house no matter how clean your house is. Dust mites are attracted to warm, moist areas and feed on dead skin flakes. Here are tips to minimize dust mites in your home.    Encase pillows and mattress/box springs in zippered allergy covers.   Wash bedding in hot water (at least 130 F) every 7-14 days.   Avoid curtains, carpet, and upholstered furniture if possible.   Use HEPA air filters and a HEPA filter vacuum . Change filters monthly. Vacuum weekly.   Keep bedroom simple, avoiding clutter, so it can quickly be dusted.   Cover heating vents with vent filters.   Keep stuffed toys in a closed container and wash or freeze regularly.   Keep clothing in the closet with the door closed.  PETS  Pets present many problems for people with allergies. Dander from pets is very difficult to remove and also is a food source for dust mites.   If possible, find the pet a new home.   If not possible, keep the pet outdoors. Never allow the pet into the bedroom.   Wash pet weekly in warm water.   Encase mattresses, pillows, and box springs in allergen-proof covers.   Use HEPA air filters and a HEPA filter vacuum . Change filters monthly.

## 2018-11-29 ENCOUNTER — MYC MEDICAL ADVICE (OUTPATIENT)
Dept: FAMILY MEDICINE | Facility: CLINIC | Age: 68
End: 2018-11-29

## 2018-11-29 DIAGNOSIS — E03.9 HYPOTHYROIDISM, UNSPECIFIED TYPE: ICD-10-CM

## 2018-11-29 DIAGNOSIS — Z13.6 CARDIOVASCULAR SCREENING; LDL GOAL LESS THAN 130: Primary | ICD-10-CM

## 2018-11-29 DIAGNOSIS — E78.00 HIGH BLOOD CHOLESTEROL: ICD-10-CM

## 2018-11-30 NOTE — TELEPHONE ENCOUNTER
Please review lab orders sign and close encounter. Sonali UNDERWOOD      Labs for cholesterol & Pre-Op 1/10/19    Pre-Op 1/10/19

## 2019-01-09 NOTE — PROGRESS NOTES
[facility]  [address] [facility] [address] [phone]  Dept: [phone]    PRE-OP EVALUATION:  Today's date: 1/10/2019    Molly Ospina (: 1950) presents for pre-operative evaluation assessment as requested by Dr. Riedel.  She requires evaluation and anesthesia risk assessment prior to undergoing surgery/procedure for treatment of cataract .    Fax number for surgical facility: [phone]  Primary Physician: Gil Dumas  Type of Anesthesia Anticipated: Local    Patient has a Health Care Directive or Living Will:  YES     Preop Questions 1/10/2019   Who is doing your surgery? [facility] riedel   What are you having done? bilateral catarat   Date of Surgery/Procedure:  and    1.  Do you have a history of Heart attack, stroke, stent, coronary bypass surgery, or other heart surgery? No   2.  Do you ever have any pain or discomfort in your chest? No   3.  Do you have a history of  Heart Failure? No   4.   Are you troubled by shortness of breath when:  walking on a level surface, or up a slight hill, or at night? No   5.  Do you currently have a cold, bronchitis or other respiratory infection? No   6.  Do you have a cough, shortness of breath, or wheezing? No   7.  Do you sometimes get pains in the calves of your legs when you walk? No   8. Do you or anyone in your family have previous history of blood clots? No   9.  Do you or does anyone in your family have a serious bleeding problem such as prolonged bleeding following surgeries or cuts? No   10. Have you ever had problems with anemia or been told to take iron pills? No   11. Have you had any abnormal blood loss such as black, tarry or bloody stools, or abnormal vaginal bleeding? No   12. Have you ever had a blood transfusion? No   13. Have you or any of your relatives ever had problems with anesthesia? No   14. Do you have sleep apnea, excessive snoring or daytime drowsiness? No   15. Do you have any  prosthetic heart valves? No   16. Do you have prosthetic joints? No   17. Is there any chance that you may be pregnant? No         HPI:     HPI related to upcoming procedure: vision changes over the last 6 months. Diagnosis with cataract suzy.       See problem list for active medical problems.  Problems all longstanding and stable, except as noted/documented.  See ROS for pertinent symptoms related to these conditions.                                                                                                                                                          .    MEDICAL HISTORY:     Patient Active Problem List    Diagnosis Date Noted     Idiopathic anaphylactic reaction, subsequent encounter 11/16/2018     Priority: Medium     Allergic rhinitis due to other allergic trigger, unspecified seasonality 11/16/2018     Priority: Medium     Advanced directives, counseling/discussion 07/19/2018     Priority: Medium     Advance Directive Problem List Overview:   Name Relationship Phone    Primary Health Care Agent            Alternative Health Care Agent          Discussed advance care planning with patient; information given to patient to review. 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 Provider to review       High  triglycerides      Priority: Medium      Past Medical History:   Diagnosis Date     Actinic keratosis      AK (actinic keratosis) 10/10/2012     Anxiety state, unspecified      High triglycerides      HTN      Mild major depression (H)      PFO (patent foramen ovale)      Unspecified hypothyroidism      Past Surgical History:   Procedure Laterality Date     HYSTERECTOMY, SEGUNDO       LASIK       TONSILLECTOMY & ADENOIDECTOMY       Current Outpatient Medications   Medication Sig Dispense Refill     acetaminophen (TYLENOL) 500 MG tablet Take 1-2 tablets by mouth every 6 hours as needed.       aspirin 325 MG EC tablet Take 325 mg by mouth daily.       cetirizine (ZYRTEC) 10 MG tablet Take 10 mg by mouth daily       EPINEPHrine (EPIPEN/ADRENACLICK/OR ANY BX GENERIC EQUIV) 0.3 MG/0.3ML injection 2-pack Inject 0.3 mLs (0.3 mg) into the muscle as needed for anaphylaxis 0.6 mL 0     FISH OIL OR 2 tablet daily       hydrochlorothiazide (HYDRODIURIL) 25 MG tablet TAKE 1 TABLET EVERY MORNING 90 tablet 1     levothyroxine (SYNTHROID/LEVOTHROID) 75 MCG tablet Take 1 tablet (75 mcg) by mouth daily 90 tablet 1     lisinopril (PRINIVIL/ZESTRIL) 10 MG tablet Take 1 tablet (10 mg) by mouth daily 90 tablet 1     metoprolol succinate (TOPROL-XL) 50 MG 24 hr tablet Take 1 tablet (50 mg) by mouth daily 90 tablet 1     Multiple Vitamins-Minerals (MULTIVITAMIN PO)        rosuvastatin (CRESTOR) 10 MG tablet Take 1 tablet (10 mg) by mouth daily 90 tablet 3     timolol (TIMOPTIC-XE) 0.5 % ophthalmic gel-form        venlafaxine (EFFEXOR-XR) 37.5 MG 24 hr capsule Take 1 capsule (37.5 mg) by mouth daily 90 capsule 1     XIIDRA 5 % SOLN opthalmic solution        OTC products: None, except as noted above    No Known Allergies   Latex Allergy: NO    Social History     Tobacco Use     Smoking status: Former Smoker     Last attempt to quit: 1985     Years since quittin.0     Smokeless tobacco: Former User   Substance Use Topics     Alcohol use:  "Yes     Comment: OCCASIONAL     History   Drug Use No       REVIEW OF SYSTEMS:   CONSTITUTIONAL: NEGATIVE for fever, chills, change in weight  INTEGUMENTARY/SKIN: NEGATIVE for worrisome rashes, moles or lesions  EYES: NEGATIVE for vision changes or irritation  ENT/MOUTH: NEGATIVE for ear, mouth and throat problems  RESP: NEGATIVE for significant cough or SOB  CV: NEGATIVE for chest pain, palpitations or peripheral edema  GI: NEGATIVE for nausea, abdominal pain, heartburn, or change in bowel habits  : NEGATIVE for frequency, dysuria, or hematuria  MUSCULOSKELETAL: NEGATIVE for significant arthralgias or myalgia  NEURO: NEGATIVE for weakness, dizziness or paresthesias  ENDOCRINE: NEGATIVE for temperature intolerance, skin/hair changes  HEME: NEGATIVE for bleeding problems  PSYCHIATRIC: NEGATIVE for changes in mood or affect    EXAM:   /68   Pulse 76   Temp 97.7  F (36.5  C) (Oral)   Resp 16   Ht 1.613 m (5' 3.5\")   Wt 82.8 kg (182 lb 8 oz)   SpO2 97%   BMI 31.82 kg/m      GENERAL APPEARANCE: healthy, alert and no distress     EYES: EOMI, PERRL     HENT: ear canals and TM's normal and nose and mouth without ulcers or lesions     NECK: no adenopathy, no asymmetry, masses, or scars and thyroid normal to palpation     RESP: lungs clear to auscultation - no rales, rhonchi or wheezes     CV: regular rates and rhythm, normal S1 S2, no S3 or S4 and no murmur, click or rub     ABDOMEN:  soft, nontender, no HSM or masses and bowel sounds normal     MS: extremities normal- no gross deformities noted, no evidence of inflammation in joints, FROM in all extremities.     SKIN: no suspicious lesions or rashes     NEURO: Normal strength and tone, sensory exam grossly normal, mentation intact and speech normal     PSYCH: mentation appears normal. and affect normal/bright     LYMPHATICS: No cervical adenopathy    DIAGNOSTICS:     Labs Drawn and in Process:   Unresulted Labs Ordered in the Past 30 Days of this Admission  "    No orders found from 11/11/2018 to 1/11/2019.        Results for orders placed or performed in visit on 01/10/19   Lipid panel reflex to direct LDL Fasting   Result Value Ref Range    Cholesterol 160 <200 mg/dL    Triglycerides 224 (H) <150 mg/dL    HDL Cholesterol 46 (L) >49 mg/dL    LDL Cholesterol Calculated 69 <100 mg/dL    Non HDL Cholesterol 114 <130 mg/dL   **TSH with free T4 reflex FUTURE anytime   Result Value Ref Range    TSH 2.84 0.40 - 4.00 mU/L   Basic metabolic panel  (Ca, Cl, CO2, Creat, Gluc, K, Na, BUN)   Result Value Ref Range    Sodium 140 133 - 144 mmol/L    Potassium 4.2 3.4 - 5.3 mmol/L    Chloride 104 94 - 109 mmol/L    Carbon Dioxide 31 20 - 32 mmol/L    Anion Gap 5 3 - 14 mmol/L    Glucose 119 (H) 70 - 99 mg/dL    Urea Nitrogen 13 7 - 30 mg/dL    Creatinine 0.64 0.52 - 1.04 mg/dL    GFR Estimate >90 >60 mL/min/[1.73_m2]    GFR Estimate If Black >90 >60 mL/min/[1.73_m2]    Calcium 9.2 8.5 - 10.1 mg/dL        Recent Labs   Lab Test 11/16/18  1045 07/19/18  0953 12/05/17  0854 06/08/17  1751   HGB 13.5  --   --  13.2     --   --  302   NA  --  139 138 133   POTASSIUM  --  4.9 4.1 3.9   CR  --  0.65 0.70 0.74        IMPRESSION:   Reason for surgery/procedure: treatment of cataract     The proposed surgical procedure is considered LOW risk.    REVISED CARDIAC RISK INDEX  The patient has the following serious cardiovascular risks for perioperative complications such as (MI, PE, VFib and 3  AV Block):  No serious cardiac risks  INTERPRETATION: 0 risks: Class I (very low risk - 0.4% complication rate)    The patient has the following additional risks for perioperative complications:  No identified additional risks      ICD-10-CM    1. Preop general physical exam Z01.818    2. Cataract of both eyes, unspecified cataract type H26.9        RECOMMENDATIONS:       APPROVAL GIVEN to proceed with proposed procedure, without further diagnostic evaluation       Signed Electronically by: Gil  Dany Dumas PA-C  Chart reviewed, agree with evaluation and recommendations above.  Crissy Zavala M.D.     Copy of this evaluation report is provided to requesting physician.    [facility] Preop Guidelines    Revised Cardiac Risk Index

## 2019-01-10 ENCOUNTER — OFFICE VISIT (OUTPATIENT)
Dept: FAMILY MEDICINE | Facility: CLINIC | Age: 69
End: 2019-01-10
Payer: MEDICARE

## 2019-01-10 VITALS
HEART RATE: 76 BPM | SYSTOLIC BLOOD PRESSURE: 107 MMHG | DIASTOLIC BLOOD PRESSURE: 68 MMHG | WEIGHT: 182.5 LBS | TEMPERATURE: 97.7 F | HEIGHT: 64 IN | BODY MASS INDEX: 31.16 KG/M2 | OXYGEN SATURATION: 97 % | RESPIRATION RATE: 16 BRPM

## 2019-01-10 DIAGNOSIS — Z01.818 PREOP GENERAL PHYSICAL EXAM: Primary | ICD-10-CM

## 2019-01-10 DIAGNOSIS — H26.9 CATARACT OF BOTH EYES, UNSPECIFIED CATARACT TYPE: ICD-10-CM

## 2019-01-10 DIAGNOSIS — Z13.6 CARDIOVASCULAR SCREENING; LDL GOAL LESS THAN 130: ICD-10-CM

## 2019-01-10 DIAGNOSIS — E03.9 HYPOTHYROIDISM, UNSPECIFIED TYPE: ICD-10-CM

## 2019-01-10 DIAGNOSIS — E78.00 HIGH BLOOD CHOLESTEROL: ICD-10-CM

## 2019-01-10 LAB
ANION GAP SERPL CALCULATED.3IONS-SCNC: 5 MMOL/L (ref 3–14)
BUN SERPL-MCNC: 13 MG/DL (ref 7–30)
CALCIUM SERPL-MCNC: 9.2 MG/DL (ref 8.5–10.1)
CHLORIDE SERPL-SCNC: 104 MMOL/L (ref 94–109)
CHOLEST SERPL-MCNC: 160 MG/DL
CO2 SERPL-SCNC: 31 MMOL/L (ref 20–32)
CREAT SERPL-MCNC: 0.64 MG/DL (ref 0.52–1.04)
GFR SERPL CREATININE-BSD FRML MDRD: >90 ML/MIN/{1.73_M2}
GLUCOSE SERPL-MCNC: 119 MG/DL (ref 70–99)
HDLC SERPL-MCNC: 46 MG/DL
LDLC SERPL CALC-MCNC: 69 MG/DL
NONHDLC SERPL-MCNC: 114 MG/DL
POTASSIUM SERPL-SCNC: 4.2 MMOL/L (ref 3.4–5.3)
SODIUM SERPL-SCNC: 140 MMOL/L (ref 133–144)
TRIGL SERPL-MCNC: 224 MG/DL
TSH SERPL DL<=0.005 MIU/L-ACNC: 2.84 MU/L (ref 0.4–4)

## 2019-01-10 PROCEDURE — 36415 COLL VENOUS BLD VENIPUNCTURE: CPT | Performed by: PHYSICIAN ASSISTANT

## 2019-01-10 PROCEDURE — 80061 LIPID PANEL: CPT | Performed by: PHYSICIAN ASSISTANT

## 2019-01-10 PROCEDURE — 84443 ASSAY THYROID STIM HORMONE: CPT | Performed by: PHYSICIAN ASSISTANT

## 2019-01-10 PROCEDURE — 80048 BASIC METABOLIC PNL TOTAL CA: CPT | Performed by: PHYSICIAN ASSISTANT

## 2019-01-10 PROCEDURE — 99214 OFFICE O/P EST MOD 30 MIN: CPT | Performed by: PHYSICIAN ASSISTANT

## 2019-01-10 RX ORDER — CETIRIZINE HYDROCHLORIDE 10 MG/1
10 TABLET ORAL DAILY
COMMUNITY
End: 2020-05-13

## 2019-01-10 ASSESSMENT — MIFFLIN-ST. JEOR: SCORE: 1334.87

## 2019-01-10 ASSESSMENT — PATIENT HEALTH QUESTIONNAIRE - PHQ9: SUM OF ALL RESPONSES TO PHQ QUESTIONS 1-9: 3

## 2019-01-11 NOTE — RESULT ENCOUNTER NOTE
Ms. Ospina,    All of your labs were normal/ stable for you.  Work on Healthy diet and exercise. Getting heart rate elevated for 30 mins most days of week.  Your blood sugers are slightly elevated. We should check that again in 6 months.     Please contact the clinic if you have additional questions.  Thank you.    Sincerely,    Gil Dumas PA-C

## 2019-01-31 DIAGNOSIS — E78.1 HIGH TRIGLYCERIDES: ICD-10-CM

## 2019-01-31 DIAGNOSIS — F33.0 MAJOR DEPRESSIVE DISORDER, RECURRENT EPISODE, MILD (H): ICD-10-CM

## 2019-01-31 RX ORDER — ROSUVASTATIN CALCIUM 10 MG/1
TABLET, COATED ORAL
Qty: 90 TABLET | Refills: 3 | Status: SHIPPED | OUTPATIENT
Start: 2019-01-31 | End: 2019-08-07

## 2019-01-31 RX ORDER — VENLAFAXINE HYDROCHLORIDE 37.5 MG/1
CAPSULE, EXTENDED RELEASE ORAL
Qty: 90 CAPSULE | Refills: 1 | Status: SHIPPED | OUTPATIENT
Start: 2019-01-31 | End: 2019-07-27

## 2019-02-06 ENCOUNTER — MYC REFILL (OUTPATIENT)
Dept: FAMILY MEDICINE | Facility: CLINIC | Age: 69
End: 2019-02-06

## 2019-02-06 DIAGNOSIS — E03.9 HYPOTHYROIDISM, UNSPECIFIED TYPE: ICD-10-CM

## 2019-02-06 RX ORDER — LEVOTHYROXINE SODIUM 75 UG/1
75 TABLET ORAL DAILY
Qty: 90 TABLET | Refills: 3 | Status: SHIPPED | OUTPATIENT
Start: 2019-02-06 | End: 2019-08-07

## 2019-02-13 DIAGNOSIS — I10 BENIGN ESSENTIAL HYPERTENSION: ICD-10-CM

## 2019-02-13 RX ORDER — METOPROLOL SUCCINATE 50 MG/1
TABLET, EXTENDED RELEASE ORAL
Qty: 90 TABLET | Refills: 3 | Status: SHIPPED | OUTPATIENT
Start: 2019-02-13 | End: 2019-08-07

## 2019-02-18 ENCOUNTER — OFFICE VISIT (OUTPATIENT)
Dept: ALLERGY | Facility: CLINIC | Age: 69
End: 2019-02-18
Payer: MEDICARE

## 2019-02-18 VITALS
OXYGEN SATURATION: 97 % | HEART RATE: 69 BPM | HEIGHT: 64 IN | TEMPERATURE: 97.4 F | RESPIRATION RATE: 14 BRPM | BODY MASS INDEX: 31.86 KG/M2 | DIASTOLIC BLOOD PRESSURE: 75 MMHG | SYSTOLIC BLOOD PRESSURE: 134 MMHG | WEIGHT: 186.6 LBS

## 2019-02-18 DIAGNOSIS — T78.2XXD IDIOPATHIC ANAPHYLACTIC REACTION, SUBSEQUENT ENCOUNTER: ICD-10-CM

## 2019-02-18 DIAGNOSIS — H10.89 OTHER CONJUNCTIVITIS OF BOTH EYES: ICD-10-CM

## 2019-02-18 DIAGNOSIS — J30.1 SEASONAL ALLERGIC RHINITIS DUE TO POLLEN: ICD-10-CM

## 2019-02-18 DIAGNOSIS — J30.0 VASOMOTOR RHINITIS: Primary | ICD-10-CM

## 2019-02-18 PROCEDURE — 99214 OFFICE O/P EST MOD 30 MIN: CPT | Performed by: ALLERGY & IMMUNOLOGY

## 2019-02-18 RX ORDER — IPRATROPIUM BROMIDE 42 UG/1
2 SPRAY, METERED NASAL 4 TIMES DAILY
Qty: 63 ML | Refills: 3 | Status: SHIPPED | OUTPATIENT
Start: 2019-02-18 | End: 2019-08-07

## 2019-02-18 ASSESSMENT — MIFFLIN-ST. JEOR: SCORE: 1353.47

## 2019-02-18 NOTE — PROGRESS NOTES
Molly Ospina is a 68 year old White female with previous medical history significant for allergic rhinitis, hyperlipidemia, hypertension, hypothyroidism and idiopathic anaphylaxis who returns for a follow up visit.    Patient was last seen in November 2018.  Since that time she has been taking Zyrtec 10 mg by mouth daily.  She has had no further episodes of anaphylaxis.  Blood testing including CBC, tryptase and TSH was normal.  She carries injectable epinephrine.  She underwent allergy blood testing which showed positivity for trees and weeds.  She has ocular watering that is present every day.  Status post eye surgery fixing cataracts recently.  History of Lasix.  She is on Xiidra and follows with an ophthalmologist.  She additionally has persistent clear rhinorrhea.  This is not made worse with any triggers.  This is present every day.  She does not take any nasal sprays.        Past Medical History:   Diagnosis Date     Actinic keratosis      AK (actinic keratosis) 10/10/2012     Anxiety state, unspecified      High triglycerides      HTN      Mild major depression (H)      PFO (patent foramen ovale)      Unspecified hypothyroidism      Family History   Problem Relation Age of Onset     Breast Cancer Mother      Heart Disease Mother      Lipids Mother      Osteoporosis Mother      Hypertension Mother      C.A.D. Mother      Cancer Mother         melanoma on the leg     Heart Disease Father      Lipids Father      Thyroid Disease Father      Hypertension Father      C.A.D. Father      Cardiovascular Father      Cancer Father         arm skin cancer     Respiratory Father         pulmonary fibrosis - on O2     Breast Cancer Maternal Grandmother      Alzheimer Disease Maternal Grandmother      Diabetes Maternal Grandmother      Heart Disease Maternal Grandfather      Cancer - colorectal Paternal Grandmother      Heart Disease Paternal Grandfather      Lipids Son      Lipids Son      Diabetes Brother      Past  Surgical History:   Procedure Laterality Date     HYSTERECTOMY, SEGUNDO       LASIK       TONSILLECTOMY & ADENOIDECTOMY         REVIEW OF SYSTEMS:  General: negative for weight gain. negative for weight loss. negative for changes in sleep.   Ears: negative for fullness. negative for hearing loss. negative for dizziness.   Nose: negative for snoring.negative for changes in smell. positive  for drainage.   Eyes: positive  for eye watering. positive  for eye itching. positive  for vision changes. negative for eye redness.  Throat: negative for hoarseness. negative for sore throat. negative for trouble swallowing.   Lungs: negative for shortness of breath.negative for wheezing. negative for sputum production.   Cardiovascular: negative for chest pain. negative for swelling of ankles. negative for fast or irregular heartbeat.   Gastrointestinal: negative for nausea. negative for heartburn. negative for acid reflux.   Musculoskeletal: negative for joint pain. negative for joint stiffness. negative for joint swelling.   Neurologic: negative for seizures. negative for fainting. negative for weakness.   Psychiatric: negative for changes in mood. negative for anxiety.   Endocrine: negative for cold intolerance. negative for heat intolerance. negative for tremors.   Lymphatic: negative for lower extremity swelling. negative for lymph node swelling.   Hematologic: negative for easy bruising. negative for easy bleeding.  Integumentary: negative for rash. negative for scaling. negative for nail changes.       Current Outpatient Medications:      acetaminophen (TYLENOL) 500 MG tablet, Take 1-2 tablets by mouth every 6 hours as needed., Disp: , Rfl:      aspirin 325 MG EC tablet, Take 325 mg by mouth daily., Disp: , Rfl:      cetirizine (ZYRTEC) 10 MG tablet, Take 10 mg by mouth daily, Disp: , Rfl:      EPINEPHrine (EPIPEN/ADRENACLICK/OR ANY BX GENERIC EQUIV) 0.3 MG/0.3ML injection 2-pack, Inject 0.3 mLs (0.3 mg) into the muscle as  needed for anaphylaxis, Disp: 0.6 mL, Rfl: 0     FISH OIL OR, 2 tablet daily, Disp: , Rfl:      hydrochlorothiazide (HYDRODIURIL) 25 MG tablet, TAKE 1 TABLET EVERY MORNING, Disp: 90 tablet, Rfl: 1     ipratropium (ATROVENT) 0.06 % nasal spray, Spray 2 sprays into both nostrils 4 times daily, Disp: 63 mL, Rfl: 3     levothyroxine (SYNTHROID/LEVOTHROID) 75 MCG tablet, Take 1 tablet (75 mcg) by mouth daily, Disp: 90 tablet, Rfl: 3     lisinopril (PRINIVIL/ZESTRIL) 10 MG tablet, Take 1 tablet (10 mg) by mouth daily, Disp: 90 tablet, Rfl: 1     metoprolol succinate ER (TOPROL-XL) 50 MG 24 hr tablet, TAKE 1 TABLET DAILY, Disp: 90 tablet, Rfl: 3     Multiple Vitamins-Minerals (MULTIVITAMIN PO), , Disp: , Rfl:      rosuvastatin (CRESTOR) 10 MG tablet, TAKE 1 TABLET DAILY, Disp: 90 tablet, Rfl: 3     timolol (TIMOPTIC-XE) 0.5 % ophthalmic gel-form, , Disp: , Rfl:      venlafaxine (EFFEXOR-XR) 37.5 MG 24 hr capsule, TAKE 1 CAPSULE DAILY, Disp: 90 capsule, Rfl: 1     XIIDRA 5 % SOLN opthalmic solution, , Disp: , Rfl:   Immunization History   Administered Date(s) Administered     TD (ADULT, 7+) 01/11/1999     TDAP Vaccine (Adacel) 03/16/2011     Zoster vaccine recombinant adjuvanted (SHINGRIX) 07/30/2018, 10/10/2018     No Known Allergies      EXAM:   Constitutional:  Appears well-developed and well-nourished. No distress.   HEENT:   Head: Normocephalic.   Mouth/Throat: No oropharyngeal exudate present.   No cobblestoning of posterior oropharynx.   Nasal tissue pink and normal appearing.  No rhinorrhea noted.    Eyes: Conjunctivae are erythematous and ocular watering noted.   Cardiovascular: Normal rate, regular rhythm and normal heart sounds. Exam reveals no gallop and no friction rub.   No murmur heard.  Respiratory: Effort normal and breath sounds normal. No respiratory distress. No wheezes. No rales.   Musculoskeletal: Normal range of motion.   Neuro: Oriented to person, place, and time.  Skin: Skin is warm and dry. No rash  noted.   Psychiatric: Normal mood and affect.     Nursing note and vitals reviewed.      ASSESSMENT/PLAN:  Problem List Items Addressed This Visit        Respiratory    Seasonal allergic rhinitis due to pollen     Perennial ocular redness and watering. History of Lasix. Status post Cataract surgery. Allergy testing positive for trees and weeds. Not cause of current symptom. Perennial drip of nose. Oral antihistamine not helpful.      -Continue to follow allergen avoidance measures.   -Zyrtec 10 mg by mouth daily.  -Continue to follow with ophthalmologist for ocular watering given recent history of cataract repair and she is already on Xiidra.          Relevant Medications    ipratropium (ATROVENT) 0.06 % nasal spray    Vasomotor rhinitis - Primary     History of clear rhinorrhea that is perennial.  No clear triggers.  Oral antihistamine not beneficial.    -Trial of Atrovent nasal spray. Ipratropium 2 sprays/nostril four times daily as needed.            Relevant Medications    ipratropium (ATROVENT) 0.06 % nasal spray       Infectious/Inflammatory    Other conjunctivitis of both eyes       Other    Idiopathic anaphylactic reaction, subsequent encounter     Patient over the last year has had 4 episodes in which she has developed systemic symptoms.  Most recent episode occurred after visiting the chiropractor and consuming a wintergreen mints.  Symptoms generally last 15-30 minutes and resolved without treatment.  Symptoms generally include diarrhea, itchy hands, flushing of face and chest and lip swelling.  Other episodes have occurred after drinking beer, fried rice and unknown.  She has injectable epinephrine. Normal serum tryptase, cbc and tsh. No further episodes. On Zyrtec 10mg PO daily.      -Discussed with patient that unclear etiology of symptoms. Idiopathic.   -Continue to follow anaphylaxis action plan  -Zyrtec 10 mg by mouth daily.         Relevant Medications    ipratropium (ATROVENT) 0.06 % nasal spray         Return to clinic in 6 months or sooner if needed.    Chart documentation with Dragon Voice recognition Software. Although reviewed after completion, some words and grammatical errors may remain.    Peter Voss,    Allergy/Immunology  [facility] [place] [facility]

## 2019-02-18 NOTE — ASSESSMENT & PLAN NOTE
Perennial ocular redness and watering. History of Lasix. Status post Cataract surgery. Allergy testing positive for trees and weeds. Not cause of current symptom. Perennial drip of nose. Oral antihistamine not helpful.      -Continue to follow allergen avoidance measures.   -Zyrtec 10 mg by mouth daily.  -Continue to follow with ophthalmologist for ocular watering given recent history of cataract repair and she is already on Xiidra.

## 2019-02-18 NOTE — LETTER
2/18/2019         RE: Molly Ospina  [facility] [facility] [id number]        Dear Colleague,    Thank you for referring your patient, Molly Ospina, to the [facility]. Please see a copy of my visit note below.    Molly Ospina is a 68 year old White female with previous medical history significant for allergic rhinitis, hyperlipidemia, hypertension, hypothyroidism and idiopathic anaphylaxis who returns for a follow up visit.    Patient was last seen in November 2018.  Since that time she has been taking Zyrtec 10 mg by mouth daily.  She has had no further episodes of anaphylaxis.  Blood testing including CBC, tryptase and TSH was normal.  She carries injectable epinephrine.  She underwent allergy blood testing which showed positivity for trees and weeds.  She has ocular watering that is present every day.  Status post eye surgery fixing cataracts recently.  History of Lasix.  She is on Xiidra and follows with an ophthalmologist.  She additionally has persistent clear rhinorrhea.  This is not made worse with any triggers.  This is present every day.  She does not take any nasal sprays.        Past Medical History:   Diagnosis Date     Actinic keratosis      AK (actinic keratosis) 10/10/2012     Anxiety state, unspecified      High triglycerides      HTN      Mild major depression (H)      PFO (patent foramen ovale)      Unspecified hypothyroidism      Family History   Problem Relation Age of Onset     Breast Cancer Mother      Heart Disease Mother      Lipids Mother      Osteoporosis Mother      Hypertension Mother      C.A.D. Mother      Cancer Mother         melanoma on the leg     Heart Disease Father      Lipids Father      Thyroid Disease Father      Hypertension Father      C.A.D. Father      Cardiovascular Father      Cancer Father         arm skin cancer     Respiratory Father         pulmonary fibrosis - on O2     Breast Cancer Maternal Grandmother      Alzheimer  Disease Maternal Grandmother      Diabetes Maternal Grandmother      Heart Disease Maternal Grandfather      Cancer - colorectal Paternal Grandmother      Heart Disease Paternal Grandfather      Lipids Son      Lipids Son      Diabetes Brother      Past Surgical History:   Procedure Laterality Date     HYSTERECTOMY, SEGUNDO       LASIK       TONSILLECTOMY & ADENOIDECTOMY         REVIEW OF SYSTEMS:  General: negative for weight gain. negative for weight loss. negative for changes in sleep.   Ears: negative for fullness. negative for hearing loss. negative for dizziness.   Nose: negative for snoring.negative for changes in smell. positive  for drainage.   Eyes: positive  for eye watering. positive  for eye itching. positive  for vision changes. negative for eye redness.  Throat: negative for hoarseness. negative for sore throat. negative for trouble swallowing.   Lungs: negative for shortness of breath.negative for wheezing. negative for sputum production.   Cardiovascular: negative for chest pain. negative for swelling of ankles. negative for fast or irregular heartbeat.   Gastrointestinal: negative for nausea. negative for heartburn. negative for acid reflux.   Musculoskeletal: negative for joint pain. negative for joint stiffness. negative for joint swelling.   Neurologic: negative for seizures. negative for fainting. negative for weakness.   Psychiatric: negative for changes in mood. negative for anxiety.   Endocrine: negative for cold intolerance. negative for heat intolerance. negative for tremors.   Lymphatic: negative for lower extremity swelling. negative for lymph node swelling.   Hematologic: negative for easy bruising. negative for easy bleeding.  Integumentary: negative for rash. negative for scaling. negative for nail changes.       Current Outpatient Medications:      acetaminophen (TYLENOL) 500 MG tablet, Take 1-2 tablets by mouth every 6 hours as needed., Disp: , Rfl:      aspirin 325 MG EC tablet, Take  325 mg by mouth daily., Disp: , Rfl:      cetirizine (ZYRTEC) 10 MG tablet, Take 10 mg by mouth daily, Disp: , Rfl:      EPINEPHrine (EPIPEN/ADRENACLICK/OR ANY BX GENERIC EQUIV) 0.3 MG/0.3ML injection 2-pack, Inject 0.3 mLs (0.3 mg) into the muscle as needed for anaphylaxis, Disp: 0.6 mL, Rfl: 0     FISH OIL OR, 2 tablet daily, Disp: , Rfl:      hydrochlorothiazide (HYDRODIURIL) 25 MG tablet, TAKE 1 TABLET EVERY MORNING, Disp: 90 tablet, Rfl: 1     ipratropium (ATROVENT) 0.06 % nasal spray, Spray 2 sprays into both nostrils 4 times daily, Disp: 63 mL, Rfl: 3     levothyroxine (SYNTHROID/LEVOTHROID) 75 MCG tablet, Take 1 tablet (75 mcg) by mouth daily, Disp: 90 tablet, Rfl: 3     lisinopril (PRINIVIL/ZESTRIL) 10 MG tablet, Take 1 tablet (10 mg) by mouth daily, Disp: 90 tablet, Rfl: 1     metoprolol succinate ER (TOPROL-XL) 50 MG 24 hr tablet, TAKE 1 TABLET DAILY, Disp: 90 tablet, Rfl: 3     Multiple Vitamins-Minerals (MULTIVITAMIN PO), , Disp: , Rfl:      rosuvastatin (CRESTOR) 10 MG tablet, TAKE 1 TABLET DAILY, Disp: 90 tablet, Rfl: 3     timolol (TIMOPTIC-XE) 0.5 % ophthalmic gel-form, , Disp: , Rfl:      venlafaxine (EFFEXOR-XR) 37.5 MG 24 hr capsule, TAKE 1 CAPSULE DAILY, Disp: 90 capsule, Rfl: 1     XIIDRA 5 % SOLN opthalmic solution, , Disp: , Rfl:   Immunization History   Administered Date(s) Administered     TD (ADULT, 7+) 01/11/1999     TDAP Vaccine (Adacel) 03/16/2011     Zoster vaccine recombinant adjuvanted (SHINGRIX) 07/30/2018, 10/10/2018     No Known Allergies      EXAM:   Constitutional:  Appears well-developed and well-nourished. No distress.   HEENT:   Head: Normocephalic.   Mouth/Throat: No oropharyngeal exudate present.   No cobblestoning of posterior oropharynx.   Nasal tissue pink and normal appearing.  No rhinorrhea noted.    Eyes: Conjunctivae are erythematous and ocular watering noted.   Cardiovascular: Normal rate, regular rhythm and normal heart sounds. Exam reveals no gallop and no  friction rub.   No murmur heard.  Respiratory: Effort normal and breath sounds normal. No respiratory distress. No wheezes. No rales.   Musculoskeletal: Normal range of motion.   Neuro: Oriented to person, place, and time.  Skin: Skin is warm and dry. No rash noted.   Psychiatric: Normal mood and affect.     Nursing note and vitals reviewed.      ASSESSMENT/PLAN:  Problem List Items Addressed This Visit        Respiratory    Seasonal allergic rhinitis due to pollen     Perennial ocular redness and watering. History of Lasix. Status post Cataract surgery. Allergy testing positive for trees and weeds. Not cause of current symptom. Perennial drip of nose. Oral antihistamine not helpful.      -Continue to follow allergen avoidance measures.   -Zyrtec 10 mg by mouth daily.  -Continue to follow with ophthalmologist for ocular watering given recent history of cataract repair and she is already on Xiidra.          Relevant Medications    ipratropium (ATROVENT) 0.06 % nasal spray    Vasomotor rhinitis - Primary     History of clear rhinorrhea that is perennial.  No clear triggers.  Oral antihistamine not beneficial.    -Trial of Atrovent nasal spray. Ipratropium 2 sprays/nostril four times daily as needed.            Relevant Medications    ipratropium (ATROVENT) 0.06 % nasal spray       Infectious/Inflammatory    Other conjunctivitis of both eyes       Other    Idiopathic anaphylactic reaction, subsequent encounter     Patient over the last year has had 4 episodes in which she has developed systemic symptoms.  Most recent episode occurred after visiting the chiropractor and consuming a wintergreen mints.  Symptoms generally last 15-30 minutes and resolved without treatment.  Symptoms generally include diarrhea, itchy hands, flushing of face and chest and lip swelling.  Other episodes have occurred after drinking beer, fried rice and unknown.  She has injectable epinephrine. Normal serum tryptase, cbc and tsh. No further  episodes. On Zyrtec 10mg PO daily.      -Discussed with patient that unclear etiology of symptoms. Idiopathic.   -Continue to follow anaphylaxis action plan  -Zyrtec 10 mg by mouth daily.         Relevant Medications    ipratropium (ATROVENT) 0.06 % nasal spray        Return to clinic in 6 months or sooner if needed.    Chart documentation with Dragon Voice recognition Software. Although reviewed after completion, some words and grammatical errors may remain.    Peter Voss,    Allergy/Immunology  [facility], [place] and [place], [place]      Again, thank you for allowing me to participate in the care of your patient.        Sincerely,        Peter Voss, DO

## 2019-02-18 NOTE — ASSESSMENT & PLAN NOTE
Patient over the last year has had 4 episodes in which she has developed systemic symptoms.  Most recent episode occurred after visiting the chiropractor and consuming a wintergreen mints.  Symptoms generally last 15-30 minutes and resolved without treatment.  Symptoms generally include diarrhea, itchy hands, flushing of face and chest and lip swelling.  Other episodes have occurred after drinking beer, fried rice and unknown.  She has injectable epinephrine. Normal serum tryptase, cbc and tsh. No further episodes. On Zyrtec 10mg PO daily.      -Discussed with patient that unclear etiology of symptoms. Idiopathic.   -Continue to follow anaphylaxis action plan  -Zyrtec 10 mg by mouth daily.

## 2019-02-18 NOTE — ASSESSMENT & PLAN NOTE
History of clear rhinorrhea that is perennial.  No clear triggers.  Oral antihistamine not beneficial.    -Trial of Atrovent nasal spray. Ipratropium 2 sprays/nostril four times daily as needed.

## 2019-02-18 NOTE — PATIENT INSTRUCTIONS
Allergy Staff Appt Hours Shot Hours Locations    Physician     Peter Voss DO       Support Staff     Corinne NUNEZ RN      Ying CURRY CMA  Monday:                      [place] 8-7     Tuesday:         [place] 8-5     Wednesday:        [place]: 7-5     Friday:        [place] 7-5   [place]        Monday: 9-5:50        Wednesday: 2-5:50        Friday: 7-12:50     [place]        Tuesday: 7-10:50        Thursday: 1:30-6:30     [place] Monday: 7:10-4:50        Tuesday: 12:30-6:30        Thursday: 7-11:50 [facility]  [address] [place] [address]  Appt Line: [phone]  Allergy RN (Monday):  [phone]    [facility]  [address] [place] [address]  Appt Line: [phone]  Allergy RN (Tues & Wed):  [phone]    [facility]  [address] [place] [address]  Appt Line: [phone]  Allergy RN (Friday):  [phone]       Important Scheduling Information  Aspirin Desensitization: Appt will last 2 clinic days. Please call the Allergy RN line for your clinic to schedule. Discontinue antihistamines 7 days prior to the appointment.     Food Challenges: Appt will last 3-4 hours. Please call the Allergy RN line for your clinic to schedule. Discontinue antihistamines 7 days prior to the appointment.     Penicillin Testing: Appt will last 2-3 hours. Please call the Allergy RN line for your clinic to schedule. Discontinue antihistamines 7 days prior to the appointment.     Skin Testing: Appt will about 40 minutes. Call the appointment line for your clinic to schedule. Discontinue antihistamines 7 days prior to the appointment.     Venom Testing: Appt will last 2-3 hours. Please call the Allergy RN line for your clinic to schedule. Discontinue antihistamines 7 days prior to the appointment.     Thank you for trusting us with your Allergy, Asthma, and Immunology care. Please feel free to contact us with any questions or concerns you may have.      - Continue Zyrtec 10mg  daily.   - Ipratropium 2 sprays/nostril four times daily as needed.   - Continue to follow anaphylaxis action plan.   - Discuss watery eye with eye doctor and continued use of Xiidra.

## 2019-08-06 NOTE — PROGRESS NOTES
Subjective     Molly Ospina is a 68 year old female seen today who  has a past medical history of Actinic keratosis, AK (actinic keratosis) (10/10/2012), Anxiety state, unspecified, High triglycerides, HTN, Mild major depression (H), PFO (patent foramen ovale), and Unspecified hypothyroidism. She also has no past medical history of Acne, Allergies, Basal cell carcinoma, Eczema, Heart valve disorder, Malignant melanoma nos, Pacemaker, Photosensitive contact dermatitis, Psoriasis, Squamous cell carcinoma, Type II or unspecified type diabetes mellitus without mention of complication, not stated as uncontrolled, Unspecified asthma(493.90), or Urticaria.   who presents to clinic today for the following health issues:       History of Present Illness        Mental Health Follow-up:  Patient presents to follow-up on Depression & Anxiety.Patient's depression since last visit has been:  Good  The patient is not having other symptoms associated with depression.  Patient's anxiety since last visit has been:  Better  The patient is not having other symptoms associated with anxiety.  Any significant life events: grief or loss  Patient is not feeling anxious or having panic attacks.  Patient has no concerns about alcohol or drug use.     Social History  Tobacco Use    Smoking status: Former Smoker      Quit date: 1985      Years since quittin.6    Smokeless tobacco: Former User  Alcohol use: Yes    Comment: OCCASIONAL  Drug use: No      Today's PHQ-9         PHQ-9 Total Score:         PHQ-9 Q9 Thoughts of better off dead/self-harm past 2 weeks :       Thoughts of suicide or self harm:      Self-harm Plan:        Self-harm Action:          Safety concerns for self or others:         Hyperlipidemia Follow-Up      Are you having any of the following symptoms? (Select all that apply)  No complaints of shortness of breath, chest pain or pressure.  No increased sweating or nausea with activity.  No left-sided neck or arm  "pain.  No complaints of pain in calves when walking 1-2 blocks.    Are you regularly taking any medication or supplement to lower your cholesterol?   Yes- Crestor    Are you having muscle aches or other side effects that you think could be caused by your cholesterol lowering medication?  No      Hypertension Follow-up      Do you check your blood pressure regularly outside of the clinic? No     Are you following a low salt diet? No    Are your blood pressures ever more than 140 on the top number (systolic) OR more   than 90 on the bottom number (diastolic), for example 140/90? Unknown         Social History     Tobacco Use     Smoking status: Former Smoker     Last attempt to quit: 1985     Years since quittin.6     Smokeless tobacco: Former User   Substance Use Topics     Alcohol use: Yes     Comment: OCCASIONAL     Drug use: No     PHQ 2018 1/10/2019 2019   PHQ-9 Total Score 9 3 3   Q9: Thoughts of better off dead/self-harm past 2 weeks Several days Not at all Not at all     KRYSTYNA-7 SCORE 3/26/2013 10/13/2016   Total Score 6 -   Total Score 6 -   Total Score - 8     No flowsheet data found.  No flowsheet data found.  In the past two weeks have you had thoughts of suicide or self-harm?  No.    Do you have concerns about your personal safety or the safety of others?   No    Suicide Assessment Five-step Evaluation and Treatment (SAFE-T)    Annual Wellness Visit    Are you in the first 12 months of your Medicare Part B coverage?  No    Physical Health:    In general, how would you rate your overall physical health? good    If you drink alcohol do you typically have >3 drinks per day or >7 drinks per week? No    Do you usually eat at least 4 servings of fruit and vegetables a day, include whole grains & fiber and avoid regularly eating high fat or \"junk\" foods? NO    Needs assistance for the following daily activities: no assistance needed    Which of the following safety concerns are present in your " home?  none identified     Hearing impairment: No    In the past 6 months, have you been bothered by leaking of urine? no    Mental Health:    In general, how would you rate your overall mental or emotional health? good  PHQ-2 Score:      Do you feel safe in your environment? Yes    Do you have a Health Care Directive? No: Advance care planning reviewed with patient; information given to patient to review.    Fall risk:  Fallen 2 or more times in the past year?: No  Any fall with injury in the past year?: No    Cognitive Screenin) Repeat 3 items (Leader, Season, Table)    2) Clock draw: NORMAL  3) 3 item recall: Recalls 3 objects  Results: 3 items recalled: COGNITIVE IMPAIRMENT LESS LIKELY    Mini-CogTM Copyright S Ilya. Licensed by the author for use in [employer]; reprinted with permission (tommie@.[facility]). All rights reserved.      Current providers sharing in care for this patient include:   Patient Care Team:  Gil Dumas PA-C as PCP - General (Family Practice)  Gil Dumas PA-C as Assigned PCP        Do you have sleep apnea, excessive snoring or daytime drowsiness?: no    Patient Active Problem List   Diagnosis     High triglycerides     Anxiety state     Mild major depression (H)     CARDIOVASCULAR SCREENING; LDL GOAL LESS THAN 130     Hyperhidrosis of face     Advanced directives, counseling/discussion     Obesity     AK (actinic keratosis)     Hypothyroidism, unspecified type     Benign essential hypertension     High blood cholesterol     Major depressive disorder, recurrent episode, mild (H)     Seborrheic keratoses     Idiopathic anaphylactic reaction, subsequent encounter     Seasonal allergic rhinitis due to pollen     Vasomotor rhinitis     Other conjunctivitis of both eyes     Past Surgical History:   Procedure Laterality Date     HYSTERECTOMY, SEGUNDO       LASIK       TONSILLECTOMY & ADENOIDECTOMY         Social History     Tobacco Use     Smoking status: Former  Smoker     Last attempt to quit: 1985     Years since quittin.6     Smokeless tobacco: Former User   Substance Use Topics     Alcohol use: Yes     Comment: OCCASIONAL     Family History   Problem Relation Age of Onset     Breast Cancer Mother      Heart Disease Mother      Lipids Mother      Osteoporosis Mother      Hypertension Mother      C.A.D. Mother      Cancer Mother         melanoma on the leg     Heart Disease Father      Lipids Father      Thyroid Disease Father      Hypertension Father      C.A.D. Father      Cardiovascular Father      Cancer Father         arm skin cancer     Respiratory Father         pulmonary fibrosis - on O2     Breast Cancer Maternal Grandmother      Alzheimer Disease Maternal Grandmother      Diabetes Maternal Grandmother      Heart Disease Maternal Grandfather      Cancer - colorectal Paternal Grandmother      Heart Disease Paternal Grandfather      Lipids Son      Colon Cancer Son      Lipids Son      Diabetes Brother          Current Outpatient Medications   Medication Sig Dispense Refill     acetaminophen (TYLENOL) 500 MG tablet Take 1-2 tablets by mouth every 6 hours as needed.       aspirin 325 MG EC tablet Take 325 mg by mouth daily.       cetirizine (ZYRTEC) 10 MG tablet Take 10 mg by mouth daily       EPINEPHrine (EPIPEN/ADRENACLICK/OR ANY BX GENERIC EQUIV) 0.3 MG/0.3ML injection 2-pack Inject 0.3 mLs (0.3 mg) into the muscle as needed for anaphylaxis 0.6 mL 0     hydrochlorothiazide (HYDRODIURIL) 25 MG tablet Take 1 tablet (25 mg) by mouth daily 90 tablet 1     levothyroxine (SYNTHROID/LEVOTHROID) 75 MCG tablet Take 1 tablet (75 mcg) by mouth daily 90 tablet 3     lisinopril (PRINIVIL/ZESTRIL) 10 MG tablet Take 1 tablet (10 mg) by mouth daily 90 tablet 1     metoprolol succinate ER (TOPROL-XL) 50 MG 24 hr tablet Take 1 tablet (50 mg) by mouth daily 90 tablet 1     rosuvastatin (CRESTOR) 10 MG tablet Take 1 tablet (10 mg) by mouth daily 90 tablet 1     timolol  "(TIMOPTIC-XE) 0.5 % ophthalmic gel-form        venlafaxine (EFFEXOR-XR) 37.5 MG 24 hr capsule Take 1 capsule (37.5 mg) by mouth daily 90 capsule 1     XIIDRA 5 % SOLN opthalmic solution        No Known Allergies  Recent Labs   Lab Test 01/10/19  0933 11/16/18  1045 07/19/18  0953 12/05/17  0854  10/15/14  0735 10/11/13  0742   LDL 69  --  61 65   < > 73 58   HDL 46*  --  50 52   < > 51 48*   TRIG 224*  --  166* 151*   < > 135 122   ALT  --   --  31  --   --  39 28   CR 0.64  --  0.65 0.70   < > 0.76 0.69   GFRESTIMATED >90  --  >90 83   < > 76 86   GFRESTBLACK >90  --  >90 >90   < > >90   GFR Calc   >90   POTASSIUM 4.2  --  4.9 4.1   < > 4.2 4.0   TSH 2.84 3.45  --  2.52   < > 6.04* 3.25    < > = values in this interval not displayed.      BP Readings from Last 3 Encounters:   08/07/19 124/75   02/18/19 134/75   01/10/19 107/68    Wt Readings from Last 3 Encounters:   08/07/19 83.9 kg (185 lb)   02/18/19 84.6 kg (186 lb 9.6 oz)   01/10/19 82.8 kg (182 lb 8 oz)         Reviewed and updated as needed this visit by Provider         Review of Systems   ROS COMP: Constitutional, HEENT, cardiovascular, pulmonary, gi and gu systems are negative, except as otherwise noted.      Objective    /75   Pulse 71   Resp 16   Ht 1.613 m (5' 3.5\")   Wt 83.9 kg (185 lb)   SpO2 98%   BMI 32.26 kg/m    Body mass index is 32.26 kg/m .  Physical Exam   GENERAL: healthy, alert and no distress  NECK: no adenopathy, no asymmetry, masses, or scars and thyroid normal to palpation  RESP: lungs clear to auscultation - no rales, rhonchi or wheezes  CV: regular rate and rhythm, normal S1 S2, no S3 or S4, no murmur, click or rub, no peripheral edema and peripheral pulses strong  ABDOMEN: soft, nontender, no hepatosplenomegaly, no masses and bowel sounds normal  MS: no gross musculoskeletal defects noted, no edema    Diagnostic Test Results:  Labs reviewed in Epic  No results found for this or any previous visit (from the " past 24 hour(s)).  Results for orders placed or performed in visit on 01/10/19   Lipid panel reflex to direct LDL Fasting   Result Value Ref Range    Cholesterol 160 <200 mg/dL    Triglycerides 224 (H) <150 mg/dL    HDL Cholesterol 46 (L) >49 mg/dL    LDL Cholesterol Calculated 69 <100 mg/dL    Non HDL Cholesterol 114 <130 mg/dL   **TSH with free T4 reflex FUTURE anytime   Result Value Ref Range    TSH 2.84 0.40 - 4.00 mU/L   Basic metabolic panel  (Ca, Cl, CO2, Creat, Gluc, K, Na, BUN)   Result Value Ref Range    Sodium 140 133 - 144 mmol/L    Potassium 4.2 3.4 - 5.3 mmol/L    Chloride 104 94 - 109 mmol/L    Carbon Dioxide 31 20 - 32 mmol/L    Anion Gap 5 3 - 14 mmol/L    Glucose 119 (H) 70 - 99 mg/dL    Urea Nitrogen 13 7 - 30 mg/dL    Creatinine 0.64 0.52 - 1.04 mg/dL    GFR Estimate >90 >60 mL/min/[1.73_m2]    GFR Estimate If Black >90 >60 mL/min/[1.73_m2]    Calcium 9.2 8.5 - 10.1 mg/dL           Assessment & Plan       ICD-10-CM    1. Benign essential hypertension I10 hydrochlorothiazide (HYDRODIURIL) 25 MG tablet     lisinopril (PRINIVIL/ZESTRIL) 10 MG tablet     metoprolol succinate ER (TOPROL-XL) 50 MG 24 hr tablet     Basic metabolic panel     OFFICE/OUTPT VISIT,EST,LEVL III   2. Hypothyroidism, unspecified type E03.9 levothyroxine (SYNTHROID/LEVOTHROID) 75 MCG tablet     OFFICE/OUTPT VISIT,EST,LEVL III   3. High triglycerides E78.1 rosuvastatin (CRESTOR) 10 MG tablet     OFFICE/OUTPT VISIT,EST,LEVL III   4. Major depressive disorder, recurrent episode, mild (H) F33.0 venlafaxine (EFFEXOR-XR) 37.5 MG 24 hr capsule     OFFICE/OUTPT VISIT,EST,LEVL III   5. Impaired fasting glucose R73.01 Hemoglobin A1c     OFFICE/OUTPT VISIT,EST,LEVL III   6. Encounter for Medicare annual wellness exam Z00.00    medical conditions are stable. meds refilled.  Work on Healthy diet and exercise. Getting heart rate elevated for 30 mins most days of week.  Recheck 6 months.        BMI:   Estimated body mass index is 32.26 kg/m   "as calculated from the following:    Height as of this encounter: 1.613 m (5' 3.5\").    Weight as of this encounter: 83.9 kg (185 lb).   Weight management plan: Discussed healthy diet and exercise guidelines    Return in about 6 months (around 2/7/2020) for BP Recheck, Depression/Anxiety, Non-fasting Lab Work.    Gil Dumas PA-C  [facility]      "

## 2019-08-07 ENCOUNTER — OFFICE VISIT (OUTPATIENT)
Dept: FAMILY MEDICINE | Facility: CLINIC | Age: 69
End: 2019-08-07
Payer: MEDICARE

## 2019-08-07 VITALS
HEIGHT: 64 IN | SYSTOLIC BLOOD PRESSURE: 124 MMHG | BODY MASS INDEX: 31.58 KG/M2 | WEIGHT: 185 LBS | HEART RATE: 71 BPM | RESPIRATION RATE: 16 BRPM | OXYGEN SATURATION: 98 % | DIASTOLIC BLOOD PRESSURE: 75 MMHG

## 2019-08-07 DIAGNOSIS — Z00.00 ENCOUNTER FOR MEDICARE ANNUAL WELLNESS EXAM: ICD-10-CM

## 2019-08-07 DIAGNOSIS — E78.1 HIGH TRIGLYCERIDES: ICD-10-CM

## 2019-08-07 DIAGNOSIS — E03.9 HYPOTHYROIDISM, UNSPECIFIED TYPE: ICD-10-CM

## 2019-08-07 DIAGNOSIS — F33.0 MAJOR DEPRESSIVE DISORDER, RECURRENT EPISODE, MILD (H): ICD-10-CM

## 2019-08-07 DIAGNOSIS — I10 BENIGN ESSENTIAL HYPERTENSION: Primary | ICD-10-CM

## 2019-08-07 DIAGNOSIS — R73.01 IMPAIRED FASTING GLUCOSE: ICD-10-CM

## 2019-08-07 LAB
ANION GAP SERPL CALCULATED.3IONS-SCNC: 8 MMOL/L (ref 3–14)
BUN SERPL-MCNC: 12 MG/DL (ref 7–30)
CALCIUM SERPL-MCNC: 9.4 MG/DL (ref 8.5–10.1)
CHLORIDE SERPL-SCNC: 104 MMOL/L (ref 94–109)
CO2 SERPL-SCNC: 29 MMOL/L (ref 20–32)
CREAT SERPL-MCNC: 0.59 MG/DL (ref 0.52–1.04)
GFR SERPL CREATININE-BSD FRML MDRD: >90 ML/MIN/{1.73_M2}
GLUCOSE SERPL-MCNC: 96 MG/DL (ref 70–99)
HBA1C MFR BLD: 4.6 % (ref 0–5.6)
POTASSIUM SERPL-SCNC: 3.9 MMOL/L (ref 3.4–5.3)
SODIUM SERPL-SCNC: 141 MMOL/L (ref 133–144)

## 2019-08-07 PROCEDURE — 99213 OFFICE O/P EST LOW 20 MIN: CPT | Performed by: PHYSICIAN ASSISTANT

## 2019-08-07 PROCEDURE — 83036 HEMOGLOBIN GLYCOSYLATED A1C: CPT | Performed by: PHYSICIAN ASSISTANT

## 2019-08-07 PROCEDURE — 36415 COLL VENOUS BLD VENIPUNCTURE: CPT | Performed by: PHYSICIAN ASSISTANT

## 2019-08-07 PROCEDURE — 80048 BASIC METABOLIC PNL TOTAL CA: CPT | Performed by: PHYSICIAN ASSISTANT

## 2019-08-07 PROCEDURE — G0439 PPPS, SUBSEQ VISIT: HCPCS | Performed by: PHYSICIAN ASSISTANT

## 2019-08-07 RX ORDER — METOPROLOL SUCCINATE 50 MG/1
50 TABLET, EXTENDED RELEASE ORAL DAILY
Qty: 90 TABLET | Refills: 1 | Status: SHIPPED | OUTPATIENT
Start: 2019-08-07 | End: 2020-05-27

## 2019-08-07 RX ORDER — LEVOTHYROXINE SODIUM 75 UG/1
75 TABLET ORAL DAILY
Qty: 90 TABLET | Refills: 3 | Status: SHIPPED | OUTPATIENT
Start: 2019-08-07 | End: 2020-08-26

## 2019-08-07 RX ORDER — HYDROCHLOROTHIAZIDE 25 MG/1
25 TABLET ORAL DAILY
Qty: 90 TABLET | Refills: 1 | Status: SHIPPED | OUTPATIENT
Start: 2019-08-07 | End: 2020-03-04

## 2019-08-07 RX ORDER — LISINOPRIL 10 MG/1
10 TABLET ORAL DAILY
Qty: 90 TABLET | Refills: 1 | Status: SHIPPED | OUTPATIENT
Start: 2019-08-07 | End: 2020-03-04

## 2019-08-07 RX ORDER — ROSUVASTATIN CALCIUM 10 MG/1
10 TABLET, COATED ORAL DAILY
Qty: 90 TABLET | Refills: 1 | Status: SHIPPED | OUTPATIENT
Start: 2019-08-07 | End: 2020-08-03

## 2019-08-07 RX ORDER — VENLAFAXINE HYDROCHLORIDE 37.5 MG/1
37.5 CAPSULE, EXTENDED RELEASE ORAL DAILY
Qty: 90 CAPSULE | Refills: 1 | Status: SHIPPED | OUTPATIENT
Start: 2019-08-07 | End: 2020-03-04

## 2019-08-07 ASSESSMENT — MIFFLIN-ST. JEOR: SCORE: 1346.21

## 2019-08-07 NOTE — PATIENT INSTRUCTIONS
Patient Education   Personalized Prevention Plan  You are due for the preventive services outlined below.  Your care team is available to assist you in scheduling these services.  If you have already completed any of these items, please share that information with your care team to update in your medical record.  Health Maintenance Due   Topic Date Due     Annual Wellness Visit  11/09/2015     Basic Metabolic Panel  07/10/2019     FALL RISK ASSESSMENT  07/19/2019     Preventive Health Recommendations    See your health care provider every year to    Review health changes.     Discuss preventive care.      Review your medicines if your doctor has prescribed any.    You no longer need a yearly Pap test unless you've had an abnormal Pap test in the past 10 years. If you have vaginal symptoms, such as bleeding or discharge, be sure to talk with your provider about a Pap test.    Every 1 to 2 years, have a mammogram.  If you are over 69, talk with your health care provider about whether or not you want to continue having screening mammograms.    Every 10 years, have a colonoscopy. Or, have a yearly FIT test (stool test). These exams will check for colon cancer.     Have a cholesterol test every 5 years, or more often if your doctor advises it.     Have a diabetes test (fasting glucose) every three years. If you are at risk for diabetes, you should have this test more often.     At age 65, have a bone density scan (DEXA) to check for osteoporosis (brittle bone disease).    Shots:    Get a flu shot each year.    Get a tetanus shot every 10 years.    Talk to your doctor about your pneumonia vaccines. There are now two you should receive - Pneumovax (PPSV 23) and Prevnar (PCV 13).    Talk to your pharmacist about the shingles vaccine.    Talk to your doctor about the hepatitis B vaccine.    Nutrition:     Eat at least 5 servings of fruits and vegetables each day.    Eat whole-grain bread, whole-wheat pasta and brown rice  instead of white grains and rice.    Get adequate Calcium and Vitamin D.     Lifestyle    Exercise at least 150 minutes a week (30 minutes a day, 5 days a week). This will help you control your weight and prevent disease.    Limit alcohol to one drink per day.    No smoking.     Wear sunscreen to prevent skin cancer.     See your dentist twice a year for an exam and cleaning.    See your eye doctor every 1 to 2 years to screen for conditions such as glaucoma, macular degeneration and cataracts.    Personalized Prevention Plan  You are due for the preventive services outlined below.  Your care team is available to assist you in scheduling these services.  If you have already completed any of these items, please share that information with your care team to update in your medical record.  Health Maintenance Due   Topic Date Due     Annual Wellness Visit  11/09/2015     Basic Metabolic Panel  07/10/2019     FALL RISK ASSESSMENT  07/19/2019

## 2020-03-02 DIAGNOSIS — I10 BENIGN ESSENTIAL HYPERTENSION: ICD-10-CM

## 2020-03-02 DIAGNOSIS — F33.0 MAJOR DEPRESSIVE DISORDER, RECURRENT EPISODE, MILD (H): ICD-10-CM

## 2020-03-03 ENCOUNTER — OFFICE VISIT (OUTPATIENT)
Dept: FAMILY MEDICINE | Facility: CLINIC | Age: 70
End: 2020-03-03
Payer: MEDICARE

## 2020-03-03 ENCOUNTER — ANCILLARY PROCEDURE (OUTPATIENT)
Dept: GENERAL RADIOLOGY | Facility: CLINIC | Age: 70
End: 2020-03-03
Attending: PHYSICIAN ASSISTANT
Payer: MEDICARE

## 2020-03-03 VITALS
OXYGEN SATURATION: 97 % | TEMPERATURE: 97.7 F | HEART RATE: 72 BPM | SYSTOLIC BLOOD PRESSURE: 150 MMHG | WEIGHT: 186 LBS | BODY MASS INDEX: 32.96 KG/M2 | HEIGHT: 63 IN | DIASTOLIC BLOOD PRESSURE: 74 MMHG

## 2020-03-03 DIAGNOSIS — M54.50 MIDLINE LOW BACK PAIN WITHOUT SCIATICA, UNSPECIFIED CHRONICITY: Primary | ICD-10-CM

## 2020-03-03 DIAGNOSIS — E78.00 HIGH BLOOD CHOLESTEROL: ICD-10-CM

## 2020-03-03 DIAGNOSIS — M54.16 LUMBAR RADICULOPATHY: ICD-10-CM

## 2020-03-03 DIAGNOSIS — E03.9 HYPOTHYROIDISM, UNSPECIFIED TYPE: ICD-10-CM

## 2020-03-03 DIAGNOSIS — I10 BENIGN ESSENTIAL HYPERTENSION: ICD-10-CM

## 2020-03-03 PROCEDURE — 99214 OFFICE O/P EST MOD 30 MIN: CPT | Performed by: PHYSICIAN ASSISTANT

## 2020-03-03 PROCEDURE — 72100 X-RAY EXAM L-S SPINE 2/3 VWS: CPT

## 2020-03-03 RX ORDER — COVID-19 ANTIGEN TEST
220 KIT MISCELLANEOUS 2 TIMES DAILY WITH MEALS
COMMUNITY
End: 2020-05-13

## 2020-03-03 ASSESSMENT — ANXIETY QUESTIONNAIRES
5. BEING SO RESTLESS THAT IT IS HARD TO SIT STILL: MORE THAN HALF THE DAYS
3. WORRYING TOO MUCH ABOUT DIFFERENT THINGS: SEVERAL DAYS
IF YOU CHECKED OFF ANY PROBLEMS ON THIS QUESTIONNAIRE, HOW DIFFICULT HAVE THESE PROBLEMS MADE IT FOR YOU TO DO YOUR WORK, TAKE CARE OF THINGS AT HOME, OR GET ALONG WITH OTHER PEOPLE: SOMEWHAT DIFFICULT
GAD7 TOTAL SCORE: 11
2. NOT BEING ABLE TO STOP OR CONTROL WORRYING: SEVERAL DAYS
1. FEELING NERVOUS, ANXIOUS, OR ON EDGE: MORE THAN HALF THE DAYS
7. FEELING AFRAID AS IF SOMETHING AWFUL MIGHT HAPPEN: SEVERAL DAYS
6. BECOMING EASILY ANNOYED OR IRRITABLE: MORE THAN HALF THE DAYS

## 2020-03-03 ASSESSMENT — PATIENT HEALTH QUESTIONNAIRE - PHQ9
5. POOR APPETITE OR OVEREATING: MORE THAN HALF THE DAYS
SUM OF ALL RESPONSES TO PHQ QUESTIONS 1-9: 6

## 2020-03-03 ASSESSMENT — MIFFLIN-ST. JEOR: SCORE: 1341.78

## 2020-03-03 NOTE — PATIENT INSTRUCTIONS
[facility]    If you have any questions regarding to your visit please contact:       Team Papi:   Clinic Hours Telephone Number   Gil Dumas PA-C 7am-7pm  Monday - Thursday   7am-5pm  Fridays  [phone] fax    Amisha/RN     After Hours Nurse Advise and Appts.   [facility] Nurse Advisors: [phone]  [facility] On Call: to make appointments anytime - [phone]    Urgent Care -   [facility] 12pm-8pm Monday-Friday  9am-5pm Saturday-Sunday      5pm-9pm Monday-Friday  9am-5pm Saturday-Sunday [phone] - [facility]        [phone] - [place]       After hours, weekend or if you need to make an appointment with your primary provider please call [phone].     If you need a medication refill please contact your pharmacy.   Please allow 3 business days for your refills to be completed.    Use [employer] (secure email communication and access to your chart) to send your primary care provider a message or make an appointment. Ask someone on your Team how to sign up for [employer]. [employer] Information [phone] (toll-free)  [employer] now has an vilma for your smart phone.  Oncare: is an Online virtual visit. You can see more about that at [email or web address] website.  If you are on [employer] we can do e-visits which is another form of virtual visit on the computer. This is an electronic visit that will go directly to your provider.  Like an in person visit, there is a charge for these, though only $45. Some medical conditions can be done through an e-visit.  You maybe receiving a survey in the mail regarding your visit today.  I would appreciate a score of 9 or 10 or it is considered a failed score. Please provide feed back if you are unable to provide a score or 9 or 10.  Recommendations of me to your friends and family would also be helpful . Thanks for your time and quick response.     Gil Dumas PA-C

## 2020-03-03 NOTE — PROGRESS NOTES
"SUBJECTIVE:   Molly Ospina is a 69 year old female seen here today for her bilateral Hip  and Leg  What happened: Dancing in October 2019      Onset: November 2019    Description:   Character: Dull ache and Cramping    Intensity: 4-5/10 current    Progression of Symptoms: same, constant     Accompanying Signs & Symptoms:  Other symptoms: radiation of pain down leg and numbness on left foot   History:   Previous similar pain: YES      Precipitating factors:   Trauma or overuse: YES- dancing     Alleviating factors:  Improved by: nothing       Therapies Tried and outcome: chiropractor with minimal help.     Unclear etiology.  She states in October she was had a fiesta and doing a lot of dancing.  She states she noticed it shortly after that.  She states she gets relief with bending forward flexion at the trunk.  She has numbness and tingling that goes down both legs.    ROS:  Constitutional, eye, ENT, skin, respiratory, cardiac, and GI are normal except as otherwise noted.    PFSH:  Past Medical, social, family histories, medications, and allergies were reviewed and updated.     OBJECTIVE:  BP (!) 150/74   Pulse 72   Temp 97.7  F (36.5  C) (Oral)   Ht 1.607 m (5' 3.25\")   Wt 84.4 kg (186 lb)   SpO2 97%   Breastfeeding No   BMI 32.69 kg/m    General:  Molly is awake, alert, and cooperative.  NAD.  Lumber/Thoracic Spine Exam: Tender:  none  Range of Motion:  full range of motion bilateral lower extremities   Strength:  5/5 bilateral lower extremities   Special tests:  negative straight leg raises    Hip Exam: Hip ROM full    X-ray lumbar: neg. pending radiology   ASSESSMENT:     ICD-10-CM    1. Midline low back pain without sciatica, unspecified chronicity M54.5 XR Lumbar Spine 2/3 Views     MR Lumbar Spine w/o Contrast   2. Lumbar radiculopathy M54.16 MR Lumbar Spine w/o Contrast   3. Benign essential hypertension I10 Basic metabolic panel   4. High blood cholesterol E78.00 Lipid panel reflex to direct LDL " Fasting   5. Hypothyroidism, unspecified type E03.9 TSH with free T4 reflex       PLAN:   ice or cold packs 20 minutes every 2-3 hrs as needed to relieve pain and swelling, for the first 2 days. Then can apply heat 20 minutes every 2-3 hrs (avoid sleeping on heating pad) there after as needed.   If you can tolerate, start non-steroidal anti-inflammatory medication like: Ibuprofen 600-800 mg three times daily or Aleve 2 tablets of over the counter strength twice a day as needed.   Tylenol can help with pain also.    Active range of motion exercises encouraged  Activity modification trying to avoid activities that cause you pain.   follow up  Dependant on MRI.   She has a follow-up with me in about 2 weeks for other med check we will check her blood pressure again at that time.      Gil Dumas PA-C

## 2020-03-03 NOTE — TELEPHONE ENCOUNTER
Refill request received within 30 days of last office visit with pcp.  Prescription is routed to the provider to please address refill.   Kim Montez RN

## 2020-03-03 NOTE — RESULT ENCOUNTER NOTE
Ms. Ospina,    Hears a copy of your report from the radiologist.  In general it was similar to what I had talked to you about in the clinic.  I will talk to you after you get your MRI done.    Please contact the clinic if you have additional questions.  Thank you.    Sincerely,    Gil Dumas PA-C

## 2020-03-04 RX ORDER — VENLAFAXINE HYDROCHLORIDE 37.5 MG/1
CAPSULE, EXTENDED RELEASE ORAL
Qty: 90 CAPSULE | Refills: 1 | Status: SHIPPED | OUTPATIENT
Start: 2020-03-04 | End: 2020-08-26

## 2020-03-04 RX ORDER — HYDROCHLOROTHIAZIDE 25 MG/1
TABLET ORAL
Qty: 90 TABLET | Refills: 1 | Status: SHIPPED | OUTPATIENT
Start: 2020-03-04 | End: 2020-08-13

## 2020-03-04 RX ORDER — LISINOPRIL 10 MG/1
TABLET ORAL
Qty: 90 TABLET | Refills: 1 | Status: SHIPPED | OUTPATIENT
Start: 2020-03-04 | End: 2020-10-16

## 2020-03-04 ASSESSMENT — ANXIETY QUESTIONNAIRES: GAD7 TOTAL SCORE: 11

## 2020-03-05 ENCOUNTER — ANCILLARY PROCEDURE (OUTPATIENT)
Dept: MRI IMAGING | Facility: CLINIC | Age: 70
End: 2020-03-05
Attending: PHYSICIAN ASSISTANT
Payer: MEDICARE

## 2020-03-05 DIAGNOSIS — M54.50 MIDLINE LOW BACK PAIN WITHOUT SCIATICA, UNSPECIFIED CHRONICITY: ICD-10-CM

## 2020-03-05 DIAGNOSIS — M54.16 LUMBAR RADICULOPATHY: ICD-10-CM

## 2020-03-05 PROCEDURE — 72148 MRI LUMBAR SPINE W/O DYE: CPT | Mod: TC

## 2020-03-11 DIAGNOSIS — E78.00 HIGH BLOOD CHOLESTEROL: ICD-10-CM

## 2020-03-11 DIAGNOSIS — I10 BENIGN ESSENTIAL HYPERTENSION: ICD-10-CM

## 2020-03-11 DIAGNOSIS — E03.9 HYPOTHYROIDISM, UNSPECIFIED TYPE: ICD-10-CM

## 2020-03-11 LAB
ANION GAP SERPL CALCULATED.3IONS-SCNC: 4 MMOL/L (ref 3–14)
BUN SERPL-MCNC: 20 MG/DL (ref 7–30)
CALCIUM SERPL-MCNC: 9.4 MG/DL (ref 8.5–10.1)
CHLORIDE SERPL-SCNC: 104 MMOL/L (ref 94–109)
CHOLEST SERPL-MCNC: 179 MG/DL
CO2 SERPL-SCNC: 29 MMOL/L (ref 20–32)
CREAT SERPL-MCNC: 0.72 MG/DL (ref 0.52–1.04)
GFR SERPL CREATININE-BSD FRML MDRD: 85 ML/MIN/{1.73_M2}
GLUCOSE SERPL-MCNC: 113 MG/DL (ref 70–99)
HDLC SERPL-MCNC: 48 MG/DL
LDLC SERPL CALC-MCNC: 87 MG/DL
NONHDLC SERPL-MCNC: 131 MG/DL
POTASSIUM SERPL-SCNC: 4.6 MMOL/L (ref 3.4–5.3)
SODIUM SERPL-SCNC: 137 MMOL/L (ref 133–144)
TRIGL SERPL-MCNC: 222 MG/DL
TSH SERPL DL<=0.005 MIU/L-ACNC: 3.91 MU/L (ref 0.4–4)

## 2020-03-11 PROCEDURE — 84443 ASSAY THYROID STIM HORMONE: CPT | Performed by: PHYSICIAN ASSISTANT

## 2020-03-11 PROCEDURE — 80048 BASIC METABOLIC PNL TOTAL CA: CPT | Performed by: PHYSICIAN ASSISTANT

## 2020-03-11 PROCEDURE — 36415 COLL VENOUS BLD VENIPUNCTURE: CPT | Performed by: PHYSICIAN ASSISTANT

## 2020-03-11 PROCEDURE — 80061 LIPID PANEL: CPT | Performed by: PHYSICIAN ASSISTANT

## 2020-03-12 ENCOUNTER — ANCILLARY PROCEDURE (OUTPATIENT)
Dept: MAMMOGRAPHY | Facility: CLINIC | Age: 70
End: 2020-03-12
Payer: MEDICARE

## 2020-03-12 DIAGNOSIS — Z12.31 VISIT FOR SCREENING MAMMOGRAM: ICD-10-CM

## 2020-03-12 PROCEDURE — 77067 SCR MAMMO BI INCL CAD: CPT | Mod: TC

## 2020-03-12 PROCEDURE — 77063 BREAST TOMOSYNTHESIS BI: CPT | Mod: TC

## 2020-03-17 ENCOUNTER — MYC MEDICAL ADVICE (OUTPATIENT)
Dept: FAMILY MEDICINE | Facility: CLINIC | Age: 70
End: 2020-03-17

## 2020-05-13 ENCOUNTER — OFFICE VISIT (OUTPATIENT)
Dept: FAMILY MEDICINE | Facility: CLINIC | Age: 70
End: 2020-05-13
Payer: MEDICARE

## 2020-05-13 VITALS
RESPIRATION RATE: 18 BRPM | DIASTOLIC BLOOD PRESSURE: 74 MMHG | BODY MASS INDEX: 33.49 KG/M2 | SYSTOLIC BLOOD PRESSURE: 132 MMHG | HEART RATE: 76 BPM | TEMPERATURE: 97 F | WEIGHT: 189 LBS | HEIGHT: 63 IN | OXYGEN SATURATION: 100 %

## 2020-05-13 DIAGNOSIS — G89.29 CHRONIC BILATERAL LOW BACK PAIN WITH LEFT-SIDED SCIATICA: ICD-10-CM

## 2020-05-13 DIAGNOSIS — Z01.818 PREOP GENERAL PHYSICAL EXAM: Primary | ICD-10-CM

## 2020-05-13 DIAGNOSIS — M54.42 CHRONIC BILATERAL LOW BACK PAIN WITH LEFT-SIDED SCIATICA: ICD-10-CM

## 2020-05-13 DIAGNOSIS — E03.9 HYPOTHYROIDISM, UNSPECIFIED TYPE: ICD-10-CM

## 2020-05-13 DIAGNOSIS — I10 BENIGN ESSENTIAL HYPERTENSION: ICD-10-CM

## 2020-05-13 LAB
ANION GAP SERPL CALCULATED.3IONS-SCNC: 7 MMOL/L (ref 3–14)
BUN SERPL-MCNC: 17 MG/DL (ref 7–30)
CALCIUM SERPL-MCNC: 9 MG/DL (ref 8.5–10.1)
CHLORIDE SERPL-SCNC: 103 MMOL/L (ref 94–109)
CO2 SERPL-SCNC: 28 MMOL/L (ref 20–32)
CREAT SERPL-MCNC: 0.62 MG/DL (ref 0.52–1.04)
GFR SERPL CREATININE-BSD FRML MDRD: >90 ML/MIN/{1.73_M2}
GLUCOSE SERPL-MCNC: 105 MG/DL (ref 70–99)
HGB BLD-MCNC: 12 G/DL (ref 11.7–15.7)
POTASSIUM SERPL-SCNC: 4.4 MMOL/L (ref 3.4–5.3)
SODIUM SERPL-SCNC: 138 MMOL/L (ref 133–144)
TSH SERPL DL<=0.005 MIU/L-ACNC: 1.66 MU/L (ref 0.4–4)

## 2020-05-13 PROCEDURE — 36415 COLL VENOUS BLD VENIPUNCTURE: CPT | Performed by: PHYSICIAN ASSISTANT

## 2020-05-13 PROCEDURE — 80048 BASIC METABOLIC PNL TOTAL CA: CPT | Performed by: PHYSICIAN ASSISTANT

## 2020-05-13 PROCEDURE — 85018 HEMOGLOBIN: CPT | Performed by: PHYSICIAN ASSISTANT

## 2020-05-13 PROCEDURE — 99214 OFFICE O/P EST MOD 30 MIN: CPT | Performed by: PHYSICIAN ASSISTANT

## 2020-05-13 PROCEDURE — 84443 ASSAY THYROID STIM HORMONE: CPT | Performed by: PHYSICIAN ASSISTANT

## 2020-05-13 PROCEDURE — 93000 ELECTROCARDIOGRAM COMPLETE: CPT | Performed by: PHYSICIAN ASSISTANT

## 2020-05-13 ASSESSMENT — MIFFLIN-ST. JEOR: SCORE: 1355.39

## 2020-05-13 NOTE — PROGRESS NOTES
"  [facility]  [address] [facility] [address] [phone]  Dept: [phone]    PRE-OP EVALUATION:  Today's date: 2020    Molly Ospina (: 1950) presents for pre-operative evaluation assessment as requested by Dr. Laurent.  She requires evaluation and anesthesia risk assessment prior to undergoing surgery/procedure for treatment of *** .    Proposed Surgery/ Procedure: Lumbar 3-5 posterior decompression with disectomy  Date of Surgery/ Procedure: 20  Time of Surgery/ Procedure: ***  Hospital/Surgical Facility: ***  Fax number for surgical facility: ***  Primary Physician: Gil Dumas  Type of Anesthesia Anticipated: {ANESTHESIA:287026}    Patient has a Health Care Directive or Living Will:  {YES/NO:513309::\"NO\"}    1. NO - Do you have a history of heart attack, stroke, stent, bypass or surgery on an artery in the head, neck, heart or legs?  2. NO - Do you ever have any pain or discomfort in your chest?  3. NO - Do you have a history of  Heart Failure?  4. NO - Are you troubled by shortness of breath when: walking on the level, up a slight hill or at night?  5. NO - Do you currently have a cold, bronchitis or other respiratory infection?  6. NO - Do you have a cough, shortness of breath or wheezing?  7. NO - Do you sometimes get pains in the calves of your legs when you walk?  8. NO - Do you or anyone in your family have previous history of blood clots?  9. NO - Do you or does anyone in your family have a serious bleeding problem such as prolonged bleeding following surgeries or cuts?  10. NO - Have you ever had problems with anemia or been told to take iron pills?  11. NO - Have you had any abnormal blood loss such as black, tarry or bloody stools, or abnormal vaginal bleeding?  12. NO - Have you ever had a blood transfusion?  13. NO - Have you or any of your relatives ever had problems with anesthesia?  14. NO - Do you have sleep apnea, excessive snoring or " daytime drowsiness?  15. NO - Do you have any prosthetic heart valves?  16. NO - Do you have prosthetic joints?  17. NO - Is there any chance that you may be pregnant?      HPI:     HPI related to upcoming procedure: ***      {. Problems:454932}    MEDICAL HISTORY:     Patient Active Problem List    Diagnosis Date Noted     Vasomotor rhinitis 02/18/2019     Priority: Medium     Other conjunctivitis of both eyes 02/18/2019     Priority: Medium     Idiopathic anaphylactic reaction, subsequent encounter 11/16/2018     Priority: Medium     Seasonal allergic rhinitis due to pollen 11/16/2018     Priority: Medium     Advanced directives, counseling/discussion 07/19/2018     Priority: Medium     Advance Directive Problem List Overview:   Name Relationship Phone    Primary Health Care Agent            Alternative Health Care Agent          Discussed advance care planning with patient; information given to patient to review. 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 Provider to review       High triglycerides      Priority: Medium      Past Medical History:   Diagnosis Date     Actinic keratosis      AK (actinic keratosis) 10/10/2012     Anxiety state, unspecified      High triglycerides      HTN      Mild major depression (H)       "PFO (patent foramen ovale)      Unspecified hypothyroidism      Past Surgical History:   Procedure Laterality Date     HYSTERECTOMY, SEGUNDO       LASIK       TONSILLECTOMY & ADENOIDECTOMY       Current Outpatient Medications   Medication Sig Dispense Refill     acetaminophen (TYLENOL) 500 MG tablet Take 1-2 tablets by mouth every 6 hours as needed.       aspirin 325 MG EC tablet Take 325 mg by mouth daily.       cetirizine (ZYRTEC) 10 MG tablet Take 10 mg by mouth daily       EPINEPHrine (EPIPEN/ADRENACLICK/OR ANY BX GENERIC EQUIV) 0.3 MG/0.3ML injection 2-pack Inject 0.3 mLs (0.3 mg) into the muscle as needed for anaphylaxis 0.6 mL 0     hydrochlorothiazide (HYDRODIURIL) 25 MG tablet TAKE 1 TABLET DAILY 90 tablet 1     levothyroxine (SYNTHROID/LEVOTHROID) 75 MCG tablet Take 1 tablet (75 mcg) by mouth daily 90 tablet 3     lisinopril (ZESTRIL) 10 MG tablet TAKE 1 TABLET DAILY 90 tablet 1     metoprolol succinate ER (TOPROL-XL) 50 MG 24 hr tablet Take 1 tablet (50 mg) by mouth daily 90 tablet 1     naproxen sodium 220 MG capsule Take 220 mg by mouth 2 times daily (with meals)       rosuvastatin (CRESTOR) 10 MG tablet Take 1 tablet (10 mg) by mouth daily 90 tablet 1     timolol (TIMOPTIC-XE) 0.5 % ophthalmic gel-form        venlafaxine (EFFEXOR-XR) 37.5 MG 24 hr capsule TAKE 1 CAPSULE DAILY 90 capsule 1     XIIDRA 5 % SOLN opthalmic solution        OTC products: {OTC ANALGESICS:756318}    No Known Allergies   Latex Allergy: {YES/NO WITH DEFAULT:112827::\"NO\"}    Social History     Tobacco Use     Smoking status: Former Smoker     Last attempt to quit: 1985     Years since quittin.3     Smokeless tobacco: Former User   Substance Use Topics     Alcohol use: Yes     Comment: OCCASIONAL     History   Drug Use No       REVIEW OF SYSTEMS:   {ROS Preop Choices:287620}    EXAM:   There were no vitals taken for this visit.  {EXAM Preop Choices:010057}    DIAGNOSTICS:   {DIAGNOSTIC FOR PREOP:120566}    Recent Labs   Lab " "Test 03/11/20  0848 08/07/19  1021  11/16/18  1045  06/08/17  1751   HGB  --   --   --  13.5  --  13.2   PLT  --   --   --  298  --  302    141   < >  --    < > 133   POTASSIUM 4.6 3.9   < >  --    < > 3.9   CR 0.72 0.59   < >  --    < > 0.74   A1C  --  4.6  --   --   --   --     < > = values in this interval not displayed.        IMPRESSION:   {PREOP REASONS:516052::\"Reason for surgery/procedure: ***\",\"Diagnosis/reason for consult: ***\"}    The proposed surgical procedure is considered {HIGH=major cardiovascular or procedures requiring prolonged anesthesia >4 hours or large fluid shifts;    INTERMEDIATE=abdominal, most orthopedic and intrathoracic surgery; LOW= endoscopy, cataract and breast surgery:126664} risk.    REVISED CARDIAC RISK INDEX  The patient has the following serious cardiovascular risks for perioperative complications such as (MI, PE, VFib and 3  AV Block):  {PREOP REVISED CARDIAC INDEX (RCI):645524:p:\"No serious cardiac risks\"}  INTERPRETATION: {REVISED CARDIAC RISK INTERPRETATION:862978}    The patient has the following additional risks for perioperative complications:  {Additional perioperative risks:984964:p:\"No identified additional risks\"}      ICD-10-CM    1. Preop general physical exam  Z01.818        RECOMMENDATIONS:     {IMPORTANT - Conditions - complete carefully!!:900822}    {IMPORTANT - Medications:131709::\"--Patient is to take all scheduled medications on the day of surgery EXCEPT for modifications listed below.\"}    {IMPORTANT - Approval:661020:p:\"APPROVAL GIVEN to proceed with proposed procedure, without further diagnostic evaluation\"}       Signed Electronically by: Gil Dumas PA-C    Copy of this evaluation report is provided to requesting physicianViral Mitchell Preop Guidelines    Revised Cardiac Risk Index  "

## 2020-05-13 NOTE — RESULT ENCOUNTER NOTE
MsViral Kbstas,    All of your labs were normal/near normal for you.    Please contact the clinic if you have additional questions.  Thank you.    Sincerely,    Gil Dumas PA-C

## 2020-05-13 NOTE — PROGRESS NOTES
[facility]  [address] [address] [facility] [address] [phone]  Dept: [phone]    PRE-OP EVALUATION:  Today's date: 2020    Molly Ospina (: 1950) presents for pre-operative evaluation assessment as requested by Dr. Laurent.  She requires evaluation and anesthesia risk assessment prior to undergoing surgery/procedure for treatment of lumbar decompression  .    Fax number for surgical facility: [phone]  Primary Physician: Gil Dumas  Type of Anesthesia Anticipated: General    Patient has a Health Care Directive or Living Will:  YES     Preop Questions 2020   Who is doing your surgery? Dr Bhaskar Laurent   What are you having done? Lumbar decompression   Date of Surgery/Procedure: 20   Facility or Hospital where procedure/surgery will be performed: [facility]   1.  Do you have a history of Heart attack, stroke, stent, coronary bypass surgery, or other heart surgery? No   2.  Do you ever have any pain or discomfort in your chest? No   3.  Do you have a history of  Heart Failure? No   4.   Are you troubled by shortness of breath when:  walking on a level surface, or up a slight hill, or at night? No   5.  Do you currently have a cold, bronchitis or other respiratory infection? No   6.  Do you have a cough, shortness of breath, or wheezing? No   7.  Do you sometimes get pains in the calves of your legs when you walk? No   8. Do you or anyone in your family have previous history of blood clots? No   9.  Do you or does anyone in your family have a serious bleeding problem such as prolonged bleeding following surgeries or cuts? No   10. Have you ever had problems with anemia or been told to take iron pills? No   11. Have you had any abnormal blood loss such as black, tarry or bloody stools, or abnormal vaginal bleeding? No   12. Have you ever had a blood transfusion? No   13. Have you or any of your relatives ever had problems with anesthesia? No   14. Do  you have sleep apnea, excessive snoring or daytime drowsiness? No   15. Do you have any prosthetic heart valves? No   16. Do you have prosthetic joints? No   17. Is there any chance that you may be pregnant? No         HPI:     HPI related to upcoming procedure: low back pain that started in march and worse in November. Tx: epidural injections and PHYSICAL THERAPY with no help.     See problem list for active medical problems.  Problems all longstanding and stable, except as noted/documented.  See ROS for pertinent symptoms related to these conditions.      MEDICAL HISTORY:     Patient Active Problem List    Diagnosis Date Noted     Chronic bilateral low back pain with left-sided sciatica 05/13/2020     Priority: Medium     Vasomotor rhinitis 02/18/2019     Priority: Medium     Other conjunctivitis of both eyes 02/18/2019     Priority: Medium     Idiopathic anaphylactic reaction, subsequent encounter 11/16/2018     Priority: Medium     Seasonal allergic rhinitis due to pollen 11/16/2018     Priority: Medium     Advanced directives, counseling/discussion 07/19/2018     Priority: Medium     Advance Directive Problem List Overview:   Name Relationship Phone    Primary Health Care Agent            Alternative Health Care Agent          Discussed advance care planning with patient; information given to patient to review. 9/13/2011          Seborrheic keratoses 12/12/2017     Priority: Medium     Major depressive disorder, recurrent episode, mild (H) 06/08/2017     Priority: Medium     Hypothyroidism, unspecified type 10/10/2016     Priority: Medium     Benign essential hypertension 10/10/2016     Priority: Medium     High blood cholesterol 10/10/2016     Priority: Medium     AK (actinic keratosis) 10/10/2012     Priority: Medium     Obesity 03/05/2012     Priority: Medium     Hyperhidrosis of face 09/13/2011     Priority: Medium     CARDIOVASCULAR SCREENING; LDL GOAL LESS THAN 130 10/31/2010     Priority: Medium     Mild  major depression (H)      Priority: Medium     Anxiety state      Priority: Medium     Problem list name updated by automated process. Provider to review       High triglycerides      Priority: Medium      Past Medical History:   Diagnosis Date     Actinic keratosis      AK (actinic keratosis) 10/10/2012     Anxiety state, unspecified      High triglycerides      HTN      Mild major depression (H)      PFO (patent foramen ovale)      Unspecified hypothyroidism      Past Surgical History:   Procedure Laterality Date     HYSTERECTOMY, SEGUNDO       LASIK       TONSILLECTOMY & ADENOIDECTOMY       Current Outpatient Medications   Medication Sig Dispense Refill     acetaminophen (TYLENOL) 500 MG tablet Take 1-2 tablets by mouth every 6 hours as needed.       EPINEPHrine (EPIPEN/ADRENACLICK/OR ANY BX GENERIC EQUIV) 0.3 MG/0.3ML injection 2-pack Inject 0.3 mLs (0.3 mg) into the muscle as needed for anaphylaxis 0.6 mL 0     hydrochlorothiazide (HYDRODIURIL) 25 MG tablet TAKE 1 TABLET DAILY 90 tablet 1     levothyroxine (SYNTHROID/LEVOTHROID) 75 MCG tablet Take 1 tablet (75 mcg) by mouth daily 90 tablet 3     lisinopril (ZESTRIL) 10 MG tablet TAKE 1 TABLET DAILY 90 tablet 1     metoprolol succinate ER (TOPROL-XL) 50 MG 24 hr tablet Take 1 tablet (50 mg) by mouth daily 90 tablet 1     rosuvastatin (CRESTOR) 10 MG tablet Take 1 tablet (10 mg) by mouth daily 90 tablet 1     timolol (TIMOPTIC-XE) 0.5 % ophthalmic gel-form        venlafaxine (EFFEXOR-XR) 37.5 MG 24 hr capsule TAKE 1 CAPSULE DAILY 90 capsule 1     aspirin 325 MG EC tablet Take 325 mg by mouth daily.       cetirizine (ZYRTEC) 10 MG tablet Take 10 mg by mouth daily       naproxen sodium 220 MG capsule Take 220 mg by mouth 2 times daily (with meals)       XIIDRA 5 % SOLN opthalmic solution        OTC products: None, except as noted above    No Known Allergies   Latex Allergy: NO    Social History     Tobacco Use     Smoking status: Former Smoker     Last attempt to quit:  "1985     Years since quittin.3     Smokeless tobacco: Former User   Substance Use Topics     Alcohol use: Yes     Comment: OCCASIONAL     History   Drug Use No       REVIEW OF SYSTEMS:   CONSTITUTIONAL: NEGATIVE for fever, chills, change in weight  INTEGUMENTARY/SKIN: NEGATIVE for worrisome rashes, moles or lesions  EYES: NEGATIVE for vision changes or irritation  ENT/MOUTH: NEGATIVE for ear, mouth and throat problems  RESP: NEGATIVE for significant cough or SOB  BREAST: NEGATIVE for masses, tenderness or discharge  CV: NEGATIVE for chest pain, palpitations or peripheral edema  GI: NEGATIVE for nausea, abdominal pain, heartburn, or change in bowel habits  : NEGATIVE for frequency, dysuria, or hematuria  MUSCULOSKELETAL: NEGATIVE for significant arthralgias or myalgia  NEURO: NEGATIVE for weakness, dizziness or paresthesias  ENDOCRINE: NEGATIVE for temperature intolerance, skin/hair changes  HEME: NEGATIVE for bleeding problems  PSYCHIATRIC: NEGATIVE for changes in mood or affect    EXAM:   /74   Pulse 76   Temp 97  F (36.1  C) (Tympanic)   Resp 18   Ht 1.607 m (5' 3.25\")   Wt 85.7 kg (189 lb)   SpO2 100%   BMI 33.22 kg/m      GENERAL APPEARANCE: healthy, alert and no distress     EYES: EOMI, PERRL     HENT: ear canals and TM's normal and nose and mouth without ulcers or lesions     NECK: no adenopathy, no asymmetry, masses, or scars and thyroid normal to palpation     RESP: lungs clear to auscultation - no rales, rhonchi or wheezes     CV: regular rates and rhythm, normal S1 S2, no S3 or S4 and no murmur, click or rub     ABDOMEN:  soft, nontender, no HSM or masses and bowel sounds normal     MS: extremities normal- no gross deformities noted, no evidence of inflammation in joints, FROM in all extremities.     SKIN: no suspicious lesions or rashes     NEURO: Normal strength and tone, sensory exam grossly normal, mentation intact and speech normal     PSYCH: mentation appears normal. and " affect normal/bright     LYMPHATICS: No cervical adenopathy    DIAGNOSTICS:     EKG: appears normal, NSR, normal axis, normal intervals, no acute ST/T changes c/w ischemia, no LVH by voltage criteria, no previous tracings to compare  Labs Drawn and in Process:   Unresulted Labs Ordered in the Past 30 Days of this Admission     Date and Time Order Name Status Description    5/13/2020 0927 TSH WITH FREE T4 REFLEX In process     5/13/2020 0927 BASIC METABOLIC PANEL In process           Recent Labs   Lab Test 03/11/20  0848 08/07/19  1021  11/16/18  1045  06/08/17  1751   HGB  --   --   --  13.5  --  13.2   PLT  --   --   --  298  --  302    141   < >  --    < > 133   POTASSIUM 4.6 3.9   < >  --    < > 3.9   CR 0.72 0.59   < >  --    < > 0.74   A1C  --  4.6  --   --   --   --     < > = values in this interval not displayed.        IMPRESSION:   Reason for surgery/procedure: lumbar decompression    The proposed surgical procedure is considered INTERMEDIATE risk.    REVISED CARDIAC RISK INDEX  The patient has the following serious cardiovascular risks for perioperative complications such as (MI, PE, VFib and 3  AV Block):  No serious cardiac risks  INTERPRETATION: 0 risks: Class I (very low risk - 0.4% complication rate)    The patient has the following additional risks for perioperative complications:  No identified additional risks      ICD-10-CM    1. Preop general physical exam  Z01.818 EKG 12-lead complete w/read - Clinics     Hemoglobin     Basic metabolic panel   2. Chronic bilateral low back pain with left-sided sciatica  M54.42     G89.29    3. Hypothyroidism, unspecified type  E03.9 TSH with free T4 reflex   4. Benign essential hypertension  I10        RECOMMENDATIONS:       --Patient is to take all scheduled medications on the day of surgery    APPROVAL GIVEN to proceed with proposed procedure, without further diagnostic evaluation       Signed Electronically by: Gil Dumas PA-C  Chart  reviewed, agree with evaluation and recommendations above.  Crissy Zavala M.D.       Copy of this evaluation report is provided to requesting physician.    [facility] Preop Guidelines    Revised Cardiac Risk Index

## 2020-05-17 ENCOUNTER — MYC MEDICAL ADVICE (OUTPATIENT)
Dept: FAMILY MEDICINE | Facility: CLINIC | Age: 70
End: 2020-05-17

## 2020-05-19 ENCOUNTER — TRANSFERRED RECORDS (OUTPATIENT)
Dept: HEALTH INFORMATION MANAGEMENT | Facility: CLINIC | Age: 70
End: 2020-05-19

## 2020-05-19 ENCOUNTER — TELEPHONE (OUTPATIENT)
Dept: FAMILY MEDICINE | Facility: CLINIC | Age: 70
End: 2020-05-19

## 2020-05-19 NOTE — TELEPHONE ENCOUNTER
Patient is scheduled for surgery this am, requested a copy of ekg tracing and have not heard back. Please fax to [phone] attn Cathy

## 2020-05-24 DIAGNOSIS — I10 BENIGN ESSENTIAL HYPERTENSION: ICD-10-CM

## 2020-05-27 ENCOUNTER — MYC REFILL (OUTPATIENT)
Dept: FAMILY MEDICINE | Facility: CLINIC | Age: 70
End: 2020-05-27

## 2020-05-27 DIAGNOSIS — I10 BENIGN ESSENTIAL HYPERTENSION: ICD-10-CM

## 2020-05-27 RX ORDER — METOPROLOL SUCCINATE 50 MG/1
50 TABLET, EXTENDED RELEASE ORAL DAILY
Qty: 90 TABLET | Refills: 1 | Status: SHIPPED | OUTPATIENT
Start: 2020-05-27 | End: 2020-11-04

## 2020-05-28 RX ORDER — METOPROLOL SUCCINATE 50 MG/1
TABLET, EXTENDED RELEASE ORAL
Qty: 90 TABLET | Refills: 3 | OUTPATIENT
Start: 2020-05-28

## 2020-06-15 ENCOUNTER — TRANSFERRED RECORDS (OUTPATIENT)
Dept: HEALTH INFORMATION MANAGEMENT | Facility: CLINIC | Age: 70
End: 2020-06-15

## 2020-08-24 DIAGNOSIS — E03.9 HYPOTHYROIDISM, UNSPECIFIED TYPE: ICD-10-CM

## 2020-08-24 DIAGNOSIS — F33.0 MAJOR DEPRESSIVE DISORDER, RECURRENT EPISODE, MILD (H): ICD-10-CM

## 2020-08-25 ENCOUNTER — MYC MEDICAL ADVICE (OUTPATIENT)
Dept: FAMILY MEDICINE | Facility: CLINIC | Age: 70
End: 2020-08-25

## 2020-08-26 RX ORDER — VENLAFAXINE HYDROCHLORIDE 37.5 MG/1
CAPSULE, EXTENDED RELEASE ORAL
Qty: 90 CAPSULE | Refills: 0 | Status: SHIPPED | OUTPATIENT
Start: 2020-08-26 | End: 2020-11-23

## 2020-08-26 RX ORDER — LEVOTHYROXINE SODIUM 75 UG/1
TABLET ORAL
Qty: 90 TABLET | Refills: 2 | Status: SHIPPED | OUTPATIENT
Start: 2020-08-26 | End: 2021-04-28

## 2020-08-26 ASSESSMENT — PATIENT HEALTH QUESTIONNAIRE - PHQ9
SUM OF ALL RESPONSES TO PHQ QUESTIONS 1-9: 6
SUM OF ALL RESPONSES TO PHQ QUESTIONS 1-9: 6
10. IF YOU CHECKED OFF ANY PROBLEMS, HOW DIFFICULT HAVE THESE PROBLEMS MADE IT FOR YOU TO DO YOUR WORK, TAKE CARE OF THINGS AT HOME, OR GET ALONG WITH OTHER PEOPLE: NOT DIFFICULT AT ALL

## 2020-08-26 NOTE — TELEPHONE ENCOUNTER
Venlafaxine refill request  Last OV: 5/13/20 with Gil Dumas PA-C for pre-op  PHQ 7/29/2019 3/3/2020 8/26/2020   PHQ-9 Total Score 3 6 6   Q9: Thoughts of better off dead/self-harm past 2 weeks Not at all Not at all Not at all     PHQ9 score today was 6.  No suicidal thought.  To provider to advise on refill request please.  Amisha García RN

## 2020-08-27 ASSESSMENT — PATIENT HEALTH QUESTIONNAIRE - PHQ9: SUM OF ALL RESPONSES TO PHQ QUESTIONS 1-9: 6

## 2020-10-06 ENCOUNTER — TRANSFERRED RECORDS (OUTPATIENT)
Dept: HEALTH INFORMATION MANAGEMENT | Facility: CLINIC | Age: 70
End: 2020-10-06

## 2020-11-21 DIAGNOSIS — F33.0 MAJOR DEPRESSIVE DISORDER, RECURRENT EPISODE, MILD (H): ICD-10-CM

## 2020-11-23 RX ORDER — VENLAFAXINE HYDROCHLORIDE 37.5 MG/1
CAPSULE, EXTENDED RELEASE ORAL
Qty: 90 CAPSULE | Refills: 0 | Status: SHIPPED | OUTPATIENT
Start: 2020-11-23 | End: 2021-02-23

## 2020-11-23 NOTE — TELEPHONE ENCOUNTER
Routing refill request to provider for review/approval because:  Patient needs to be seen because it has been more than 1 year since last office visit for this medication.   PHQ-9    Elana Victor BSN, RN

## 2020-12-13 ENCOUNTER — HEALTH MAINTENANCE LETTER (OUTPATIENT)
Age: 70
End: 2020-12-13

## 2021-02-22 DIAGNOSIS — R73.9 HYPERGLYCEMIA: ICD-10-CM

## 2021-02-22 DIAGNOSIS — E78.00 HIGH BLOOD CHOLESTEROL: Primary | ICD-10-CM

## 2021-02-22 DIAGNOSIS — F33.0 MAJOR DEPRESSIVE DISORDER, RECURRENT EPISODE, MILD (H): ICD-10-CM

## 2021-02-22 DIAGNOSIS — E03.9 HYPOTHYROIDISM, UNSPECIFIED TYPE: ICD-10-CM

## 2021-02-22 DIAGNOSIS — I10 BENIGN ESSENTIAL HYPERTENSION: ICD-10-CM

## 2021-02-22 NOTE — LETTER
February 23, 2021    Molly Ospina  [address] [place] [address]    Dear Molly,       We recently received a refill request for venlafaxine (EFFEXOR-XR) 37.5 MG 24 hr capsule.  We have refilled this for a one time 90 day supply only because you are due for a:    Medication check office visit and fasting lab appointment      Please schedule this lab appointment 4-5 days prior to the office visit.     Please call at your earliest convenience so that there will not be a delay with your future refills.          Thank you,   Your [facility] Team/  [phone]

## 2021-02-23 RX ORDER — VENLAFAXINE HYDROCHLORIDE 37.5 MG/1
CAPSULE, EXTENDED RELEASE ORAL
Qty: 90 CAPSULE | Refills: 1 | Status: SHIPPED | OUTPATIENT
Start: 2021-02-23 | End: 2021-04-28

## 2021-04-15 DIAGNOSIS — E78.1 HIGH TRIGLYCERIDES: ICD-10-CM

## 2021-04-15 DIAGNOSIS — I10 BENIGN ESSENTIAL HYPERTENSION: ICD-10-CM

## 2021-04-16 ENCOUNTER — IMMUNIZATION (OUTPATIENT)
Dept: NURSING | Facility: CLINIC | Age: 71
End: 2021-04-16
Payer: MEDICARE

## 2021-04-16 PROCEDURE — 0001A PR COVID VAC PFIZER DIL RECON 30 MCG/0.3 ML IM: CPT

## 2021-04-16 PROCEDURE — 91300 PR COVID VAC PFIZER DIL RECON 30 MCG/0.3 ML IM: CPT

## 2021-04-16 RX ORDER — METOPROLOL SUCCINATE 50 MG/1
TABLET, EXTENDED RELEASE ORAL
Qty: 90 TABLET | Refills: 0 | Status: SHIPPED | OUTPATIENT
Start: 2021-04-16 | End: 2021-04-28

## 2021-04-16 RX ORDER — HYDROCHLOROTHIAZIDE 25 MG/1
TABLET ORAL
Qty: 90 TABLET | Refills: 0 | Status: SHIPPED | OUTPATIENT
Start: 2021-04-16 | End: 2021-04-28

## 2021-04-16 RX ORDER — LISINOPRIL 10 MG/1
TABLET ORAL
Qty: 90 TABLET | Refills: 0 | Status: SHIPPED | OUTPATIENT
Start: 2021-04-16 | End: 2021-04-28

## 2021-04-16 RX ORDER — ROSUVASTATIN CALCIUM 10 MG/1
TABLET, COATED ORAL
Qty: 90 TABLET | Refills: 0 | Status: SHIPPED | OUTPATIENT
Start: 2021-04-16 | End: 2021-04-28

## 2021-04-17 ENCOUNTER — MYC MEDICAL ADVICE (OUTPATIENT)
Dept: FAMILY MEDICINE | Facility: CLINIC | Age: 71
End: 2021-04-17

## 2021-04-26 DIAGNOSIS — E03.9 HYPOTHYROIDISM, UNSPECIFIED TYPE: ICD-10-CM

## 2021-04-26 DIAGNOSIS — I10 BENIGN ESSENTIAL HYPERTENSION: ICD-10-CM

## 2021-04-26 DIAGNOSIS — R73.9 HYPERGLYCEMIA: ICD-10-CM

## 2021-04-26 DIAGNOSIS — E78.00 HIGH BLOOD CHOLESTEROL: ICD-10-CM

## 2021-04-26 LAB
ALBUMIN SERPL-MCNC: 4.4 G/DL (ref 3.4–5)
ALP SERPL-CCNC: 78 U/L (ref 40–150)
ALT SERPL W P-5'-P-CCNC: 36 U/L (ref 0–50)
ANION GAP SERPL CALCULATED.3IONS-SCNC: 8 MMOL/L (ref 3–14)
AST SERPL W P-5'-P-CCNC: 18 U/L (ref 0–45)
BILIRUB SERPL-MCNC: 0.8 MG/DL (ref 0.2–1.3)
BUN SERPL-MCNC: 14 MG/DL (ref 7–30)
CALCIUM SERPL-MCNC: 9.6 MG/DL (ref 8.5–10.1)
CHLORIDE SERPL-SCNC: 102 MMOL/L (ref 94–109)
CHOLEST SERPL-MCNC: 168 MG/DL
CO2 SERPL-SCNC: 28 MMOL/L (ref 20–32)
CREAT SERPL-MCNC: 0.7 MG/DL (ref 0.52–1.04)
GFR SERPL CREATININE-BSD FRML MDRD: 88 ML/MIN/{1.73_M2}
GLUCOSE SERPL-MCNC: 102 MG/DL (ref 70–99)
HBA1C MFR BLD: 4.9 % (ref 0–5.6)
HDLC SERPL-MCNC: 50 MG/DL
LDLC SERPL CALC-MCNC: 81 MG/DL
NONHDLC SERPL-MCNC: 118 MG/DL
POTASSIUM SERPL-SCNC: 4 MMOL/L (ref 3.4–5.3)
PROT SERPL-MCNC: 7 G/DL (ref 6.8–8.8)
SODIUM SERPL-SCNC: 138 MMOL/L (ref 133–144)
T4 FREE SERPL-MCNC: 1.06 NG/DL (ref 0.76–1.46)
TRIGL SERPL-MCNC: 185 MG/DL
TSH SERPL DL<=0.005 MIU/L-ACNC: 4.41 MU/L (ref 0.4–4)

## 2021-04-26 PROCEDURE — 84443 ASSAY THYROID STIM HORMONE: CPT | Performed by: PHYSICIAN ASSISTANT

## 2021-04-26 PROCEDURE — 83036 HEMOGLOBIN GLYCOSYLATED A1C: CPT | Performed by: PHYSICIAN ASSISTANT

## 2021-04-26 PROCEDURE — 80061 LIPID PANEL: CPT | Performed by: PHYSICIAN ASSISTANT

## 2021-04-26 PROCEDURE — 36415 COLL VENOUS BLD VENIPUNCTURE: CPT | Performed by: PHYSICIAN ASSISTANT

## 2021-04-26 PROCEDURE — 80053 COMPREHEN METABOLIC PANEL: CPT | Performed by: PHYSICIAN ASSISTANT

## 2021-04-26 PROCEDURE — 84439 ASSAY OF FREE THYROXINE: CPT | Performed by: PHYSICIAN ASSISTANT

## 2021-04-28 ENCOUNTER — OFFICE VISIT (OUTPATIENT)
Dept: FAMILY MEDICINE | Facility: CLINIC | Age: 71
End: 2021-04-28
Payer: MEDICARE

## 2021-04-28 VITALS
RESPIRATION RATE: 18 BRPM | OXYGEN SATURATION: 96 % | SYSTOLIC BLOOD PRESSURE: 136 MMHG | DIASTOLIC BLOOD PRESSURE: 77 MMHG | BODY MASS INDEX: 33.66 KG/M2 | HEIGHT: 63 IN | WEIGHT: 190 LBS | HEART RATE: 77 BPM

## 2021-04-28 DIAGNOSIS — Z00.00 ENCOUNTER FOR PREVENTIVE HEALTH EXAMINATION: Primary | ICD-10-CM

## 2021-04-28 DIAGNOSIS — E03.9 HYPOTHYROIDISM, UNSPECIFIED TYPE: ICD-10-CM

## 2021-04-28 DIAGNOSIS — F33.0 MAJOR DEPRESSIVE DISORDER, RECURRENT EPISODE, MILD (H): ICD-10-CM

## 2021-04-28 DIAGNOSIS — I10 BENIGN ESSENTIAL HYPERTENSION: ICD-10-CM

## 2021-04-28 DIAGNOSIS — E78.1 HIGH TRIGLYCERIDES: ICD-10-CM

## 2021-04-28 PROCEDURE — 99214 OFFICE O/P EST MOD 30 MIN: CPT | Mod: 25 | Performed by: PHYSICIAN ASSISTANT

## 2021-04-28 PROCEDURE — G0438 PPPS, INITIAL VISIT: HCPCS | Performed by: PHYSICIAN ASSISTANT

## 2021-04-28 RX ORDER — CITALOPRAM HYDROBROMIDE 10 MG/1
10 TABLET ORAL DAILY
Qty: 30 TABLET | Refills: 0 | Status: SHIPPED | OUTPATIENT
Start: 2021-04-28 | End: 2021-05-19 | Stop reason: DRUGHIGH

## 2021-04-28 RX ORDER — ROSUVASTATIN CALCIUM 10 MG/1
10 TABLET, COATED ORAL DAILY
Qty: 90 TABLET | Refills: 1 | Status: SHIPPED | OUTPATIENT
Start: 2021-04-28 | End: 2021-10-26

## 2021-04-28 RX ORDER — HYDROCHLOROTHIAZIDE 25 MG/1
25 TABLET ORAL DAILY
Qty: 90 TABLET | Refills: 1 | Status: SHIPPED | OUTPATIENT
Start: 2021-04-28 | End: 2021-10-26

## 2021-04-28 RX ORDER — VENLAFAXINE HYDROCHLORIDE 37.5 MG/1
37.5 CAPSULE, EXTENDED RELEASE ORAL DAILY
Qty: 90 CAPSULE | Refills: 1 | Status: SHIPPED | OUTPATIENT
Start: 2021-04-28 | End: 2021-05-19

## 2021-04-28 RX ORDER — LISINOPRIL 10 MG/1
10 TABLET ORAL DAILY
Qty: 90 TABLET | Refills: 1 | Status: SHIPPED | OUTPATIENT
Start: 2021-04-28 | End: 2021-10-26

## 2021-04-28 RX ORDER — METOPROLOL SUCCINATE 50 MG/1
50 TABLET, EXTENDED RELEASE ORAL DAILY
Qty: 90 TABLET | Refills: 1 | Status: SHIPPED | OUTPATIENT
Start: 2021-04-28 | End: 2021-10-26

## 2021-04-28 RX ORDER — LEVOTHYROXINE SODIUM 75 UG/1
75 TABLET ORAL DAILY
Qty: 90 TABLET | Refills: 3 | Status: SHIPPED | OUTPATIENT
Start: 2021-04-28 | End: 2021-10-26

## 2021-04-28 ASSESSMENT — ANXIETY QUESTIONNAIRES
2. NOT BEING ABLE TO STOP OR CONTROL WORRYING: MORE THAN HALF THE DAYS
GAD7 TOTAL SCORE: 10
6. BECOMING EASILY ANNOYED OR IRRITABLE: MORE THAN HALF THE DAYS
3. WORRYING TOO MUCH ABOUT DIFFERENT THINGS: MORE THAN HALF THE DAYS
4. TROUBLE RELAXING: SEVERAL DAYS
5. BEING SO RESTLESS THAT IT IS HARD TO SIT STILL: SEVERAL DAYS
GAD7 TOTAL SCORE: 10
7. FEELING AFRAID AS IF SOMETHING AWFUL MIGHT HAPPEN: SEVERAL DAYS
1. FEELING NERVOUS, ANXIOUS, OR ON EDGE: SEVERAL DAYS
7. FEELING AFRAID AS IF SOMETHING AWFUL MIGHT HAPPEN: SEVERAL DAYS
GAD7 TOTAL SCORE: 10

## 2021-04-28 ASSESSMENT — PATIENT HEALTH QUESTIONNAIRE - PHQ9
SUM OF ALL RESPONSES TO PHQ QUESTIONS 1-9: 4
SUM OF ALL RESPONSES TO PHQ QUESTIONS 1-9: 4
10. IF YOU CHECKED OFF ANY PROBLEMS, HOW DIFFICULT HAVE THESE PROBLEMS MADE IT FOR YOU TO DO YOUR WORK, TAKE CARE OF THINGS AT HOME, OR GET ALONG WITH OTHER PEOPLE: NOT DIFFICULT AT ALL

## 2021-04-28 ASSESSMENT — MIFFLIN-ST. JEOR: SCORE: 1354.92

## 2021-04-28 NOTE — PROGRESS NOTES
"  Assessment & Plan       ICD-10-CM    1. Encounter for preventive health examination  Z00.00    2. Benign essential hypertension  I10 hydrochlorothiazide (HYDRODIURIL) 25 MG tablet     lisinopril (ZESTRIL) 10 MG tablet     metoprolol succinate ER (TOPROL-XL) 50 MG 24 hr tablet     OFFICE/OUTPT VISIT,EST,LEVL III   3. Hypothyroidism, unspecified type  E03.9 levothyroxine (SYNTHROID/LEVOTHROID) 75 MCG tablet     OFFICE/OUTPT VISIT,EST,LEVL III   4. High triglycerides  E78.1 rosuvastatin (CRESTOR) 10 MG tablet     OFFICE/OUTPT VISIT,EST,LEVL III   5. Major depressive disorder, recurrent episode, mild (H)  F33.0 venlafaxine (EFFEXOR-XR) 37.5 MG 24 hr capsule     citalopram (CELEXA) 10 MG tablet   6. BMI 33.0-33.9,adult  Z68.33    1. Work on Healthy diet and exercise. Getting heart rate elevated for 30 mins most days of week.  Labs reviewed.   2-4. medical conditions are stable. meds refilled.  5.  At patient's request we are going to taper her off her Effexor and add citalopram.  We will have her start taking her Effexor every other day and start taking her citalopram every day.  She states she has about a month left of her Effexor if she were to take it every other day.  We will recheck her in 4 weeks.  Warning signs of side effects were discussed.    BMI:   Estimated body mass index is 33.39 kg/m  as calculated from the following:    Height as of this encounter: 1.607 m (5' 3.25\").    Weight as of this encounter: 86.2 kg (190 lb).   Weight management plan: Discussed healthy diet and exercise guidelines      Return in about 4 weeks (around 5/26/2021) for recheck.    SALAS Glez [facility] is a 70 year old who presents for the following health issues     History of Present Illness       Mental Health Follow-up:  Patient presents to follow-up on Depression & Anxiety.Patient's depression since last visit has been:  Medium  The patient is having other symptoms " associated with depression.  Patient's anxiety since last visit has been:  Medium  The patient is having other symptoms associated with anxiety.  Any significant life events: relationship concerns, grief or loss and health concerns  Patient is feeling anxious or having panic attacks.  Patient has no concerns about alcohol or drug use.     Social History  Tobacco Use    Smoking status: Former Smoker      Quit date: 1985      Years since quittin.3    Smokeless tobacco: Former User  Alcohol use: Yes    Comment: OCCASIONAL  Drug use: No      Today's PHQ-9         PHQ-9 Total Score:     (P) 4   PHQ-9 Q9 Thoughts of better off dead/self-harm past 2 weeks :   (P) Not at all   Thoughts of suicide or self harm:      Self-harm Plan:        Self-harm Action:          Safety concerns for self or others:          She feels like her depression is gradually getting worse.  She did not like how she feels when she increases her Effexor is requesting to try different medication.  She believes she has been on Wellbutrin and Paxil in the past with minimal relief.  She has been in counseling in the past but not interested at this time.    Hyperlipidemia Follow-Up      Are you regularly taking any medication or supplement to lower your cholesterol?   Yes- Rosuvastatin     Are you having muscle aches or other side effects that you think could be caused by your cholesterol lowering medication?  No    Hypertension Follow-up      Do you check your blood pressure regularly outside of the clinic? Yes     Are you following a low salt diet? Less salt     Are your blood pressures ever more than 140 on the top number (systolic) OR more   than 90 on the bottom number (diastolic), for example 140/90? Yes    Social History     Tobacco Use     Smoking status: Former Smoker     Quit date: 1985     Years since quittin.3     Smokeless tobacco: Former User   Substance Use Topics     Alcohol use: Yes     Comment: OCCASIONAL     Drug use:  "No     PHQ 3/3/2020 8/26/2020 4/28/2021   PHQ-9 Total Score 6 6 4   Q9: Thoughts of better off dead/self-harm past 2 weeks Not at all Not at all Not at all     KRYSTYNA-7 SCORE 10/13/2016 3/3/2020 4/28/2021   Total Score - - -   Total Score - - 10 (moderate anxiety)   Total Score 8 11 10         Suicide Assessment Five-step Evaluation and Treatment (SAFE-T)    Hypothyroidism Follow-up      Since last visit, patient describes the following symptoms: Weight stable, no hair loss, no skin changes, no constipation, no loose stools and dry skin    Annual Wellness Visit    Patient has been advised of split billing requirements and indicates understanding: Yes     Are you in the first 12 months of your Medicare Part B coverage?  No    Physical Health:    In general, how would you rate your overall physical health? good    Outside of work, how many days during the week do you exercise?none    Outside of work, approximately how many minutes a day do you exercise?not applicable    If you drink alcohol do you typically have >3 drinks per day or >7 drinks per week? No    Do you usually eat at least 4 servings of fruit and vegetables a day, include whole grains & fiber and avoid regularly eating high fat or \"junk\" foods? Yes    Do you have any problems taking medications regularly? No    Do you have any side effects from medications? none    Needs assistance for the following daily activities: no assistance needed    Which of the following safety concerns are present in your home?  none identified     Hearing impairment: No    In the past 6 months, have you been bothered by leaking of urine? no    Mental Health:    In general, how would you rate your overall mental or emotional health? good  PHQ-2 Score:      Do you feel safe in your environment? Yes    Have you ever done Advance Care Planning? (For example, a Health Directive, POLST, or a discussion with a medical provider or your loved ones about your wishes)? No, advance care " "planning information given to patient to review.  Patient plans to discuss their wishes with loved ones or provider.      Fall risk:  Fallen 2 or more times in the past year?: No  Any fall with injury in the past year?: No    Cognitive Screenin) Repeat 3 items (Leader, Season, Table)    2) Clock draw: NORMAL  3) 3 item recall: Recalls 2 objects   Results: NORMAL clock, 1-2 items recalled: COGNITIVE IMPAIRMENT LESS LIKELY    Mini-CogTM Copyright DONTE Caraballo. Licensed by the author for use in [facility]; reprinted with permission (tommie@[facility]). All rights reserved.      Current providers sharing in care for this patient include:   Patient Care Team:  Gil Dumas PA-C as PCP - General (Family Practice)  Gil Dumas PA-C as Assigned PCP    Patient has been advised of split billing requirements and indicates understanding: Yes    Review of Systems   Constitutional, HEENT, cardiovascular, pulmonary, gi and gu systems are negative, except as otherwise noted.      Objective    /77   Pulse 77   Resp 18   Ht 1.607 m (5' 3.25\")   Wt 86.2 kg (190 lb)   SpO2 96%   BMI 33.39 kg/m    Body mass index is 33.39 kg/m .  Physical Exam   GENERAL: healthy, alert and no distress  EYES: Eyes grossly normal to inspection, PERRL and conjunctivae and sclerae normal  HENT: ear canals and TM's normal, nose and mouth without ulcers or lesions  NECK: no adenopathy, no asymmetry, masses, or scars and thyroid normal to palpation  RESP: lungs clear to auscultation - no rales, rhonchi or wheezes  CV: regular rate and rhythm, normal S1 S2, no S3 or S4, no murmur, click or rub, no peripheral edema and peripheral pulses strong  ABDOMEN: soft, nontender, no hepatosplenomegaly, no masses and bowel sounds normal  MS: no gross musculoskeletal defects noted, no edema  SKIN: no suspicious lesions or rashes  NEURO: Normal strength and tone, mentation intact and speech normal  PSYCH: mentation appears normal, " affect normal/bright  Female exam deferred    Labs reviewed.           Answers for HPI/ROS submitted by the patient on 4/28/2021   Chronic problems general questions HPI Form  If you checked off any problems, how difficult have these problems made it for you to do your work, take care of things at home, or get along with other people?: Not difficult at all  PHQ9 TOTAL SCORE: 4  KRYSTYNA 7 TOTAL SCORE: 10

## 2021-04-29 ASSESSMENT — ANXIETY QUESTIONNAIRES: GAD7 TOTAL SCORE: 10

## 2021-04-29 ASSESSMENT — PATIENT HEALTH QUESTIONNAIRE - PHQ9: SUM OF ALL RESPONSES TO PHQ QUESTIONS 1-9: 4

## 2021-05-07 ENCOUNTER — IMMUNIZATION (OUTPATIENT)
Dept: NURSING | Facility: CLINIC | Age: 71
End: 2021-05-07
Attending: INTERNAL MEDICINE
Payer: MEDICARE

## 2021-05-07 PROCEDURE — 0002A PR COVID VAC PFIZER DIL RECON 30 MCG/0.3 ML IM: CPT

## 2021-05-07 PROCEDURE — 91300 PR COVID VAC PFIZER DIL RECON 30 MCG/0.3 ML IM: CPT

## 2021-05-19 ENCOUNTER — VIRTUAL VISIT (OUTPATIENT)
Dept: FAMILY MEDICINE | Facility: CLINIC | Age: 71
End: 2021-05-19
Payer: MEDICARE

## 2021-05-19 DIAGNOSIS — F33.0 MAJOR DEPRESSIVE DISORDER, RECURRENT EPISODE, MILD (H): Primary | ICD-10-CM

## 2021-05-19 PROCEDURE — 99441 PR PHYSICIAN TELEPHONE EVALUATION 5-10 MIN: CPT | Mod: 95 | Performed by: PHYSICIAN ASSISTANT

## 2021-05-19 RX ORDER — CITALOPRAM HYDROBROMIDE 20 MG/1
20 TABLET ORAL DAILY
Qty: 30 TABLET | Refills: 0 | Status: SHIPPED | OUTPATIENT
Start: 2021-05-19 | End: 2021-06-16

## 2021-05-19 NOTE — PROGRESS NOTES
Molly is a 70 year old who is being evaluated via a billable telephone visit.      What phone number would you like to be contacted at? [phone]  How would you like to obtain your AVS? MyChart    Assessment & Plan       ICD-10-CM    1. Major depressive disorder, recurrent episode, mild (H)  F33.0 citalopram (CELEXA) 20 MG tablet   Talk to patient through the telephone.  We talked about her concerns.  We will go ahead and increase her citalopram to 20 mg a day.  We will recheck again in 4 weeks.  Okay for phone visit.  Warning signs of side effects were discussed.    Return in about 4 weeks (around 2021) for recheck.    Gil Dumas PA-C  [facility]   Molly is a 70 year old who presents for the following health issues     HPI     Depression and Anxiety Follow-Up    How are you doing with your depression since your last visit? Improved     How are you doing with your anxiety since your last visit?  Improved     Are you having other symptoms that might be associated with depression or anxiety? No    Have you had a significant life event? No     Do you have any concerns with your use of alcohol or other drugs? No  40% better.   Did wean off Effexor with no complications.  Social History     Tobacco Use     Smoking status: Former Smoker     Quit date: 1985     Years since quittin.4     Smokeless tobacco: Former User   Substance Use Topics     Alcohol use: Yes     Comment: OCCASIONAL     Drug use: No     PHQ 3/3/2020 2020 2021   PHQ-9 Total Score 6 6 4   Q9: Thoughts of better off dead/self-harm past 2 weeks Not at all Not at all Not at all     KRYSTYNA-7 SCORE 10/13/2016 3/3/2020 2021   Total Score - - -   Total Score - - 10 (moderate anxiety)   Total Score 8 11 10     Suicide Assessment Five-step Evaluation and Treatment (SAFE-T)      Review of Systems   Constitutional, HEENT, cardiovascular, pulmonary, gi and gu systems are negative, except as  otherwise noted.      Objective       Vitals:  No vitals were obtained today due to virtual visit.    Physical Exam   healthy, alert and no distress  PSYCH: Alert and oriented times 3; coherent speech, normal   rate and volume, able to articulate logical thoughts, able   to abstract reason, no tangential thoughts, no hallucinations   or delusions  Her affect is normal  RESP: No cough, no audible wheezing, able to talk in full sentences  Remainder of exam unable to be completed due to telephone visits          Phone call duration: 5 minutes

## 2021-06-16 DIAGNOSIS — F33.0 MAJOR DEPRESSIVE DISORDER, RECURRENT EPISODE, MILD (H): ICD-10-CM

## 2021-06-16 RX ORDER — CITALOPRAM HYDROBROMIDE 20 MG/1
TABLET ORAL
Qty: 30 TABLET | Refills: 0 | Status: SHIPPED | OUTPATIENT
Start: 2021-06-16 | End: 2021-06-23

## 2021-06-16 NOTE — LETTER
June 16, 2021    Molly Ospina  [address] [place] [address]    Dear Molly,       We recently received a refill request for citalopram (CELEXA) 20 MG tablet.  We have refilled this for a one time 30 day supply only because you are due for a:    Depression phone or office visit      Please call at your earliest convenience so that there will not be a delay with your future refills.          Thank you,   Your [facility] Team/sp  [phone]

## 2021-06-22 ASSESSMENT — PATIENT HEALTH QUESTIONNAIRE - PHQ9
SUM OF ALL RESPONSES TO PHQ QUESTIONS 1-9: 1
SUM OF ALL RESPONSES TO PHQ QUESTIONS 1-9: 1

## 2021-06-22 ASSESSMENT — ANXIETY QUESTIONNAIRES
7. FEELING AFRAID AS IF SOMETHING AWFUL MIGHT HAPPEN: SEVERAL DAYS
2. NOT BEING ABLE TO STOP OR CONTROL WORRYING: SEVERAL DAYS
7. FEELING AFRAID AS IF SOMETHING AWFUL MIGHT HAPPEN: SEVERAL DAYS
5. BEING SO RESTLESS THAT IT IS HARD TO SIT STILL: SEVERAL DAYS
4. TROUBLE RELAXING: SEVERAL DAYS
1. FEELING NERVOUS, ANXIOUS, OR ON EDGE: SEVERAL DAYS
6. BECOMING EASILY ANNOYED OR IRRITABLE: NOT AT ALL
3. WORRYING TOO MUCH ABOUT DIFFERENT THINGS: SEVERAL DAYS
GAD7 TOTAL SCORE: 6

## 2021-06-23 ENCOUNTER — VIRTUAL VISIT (OUTPATIENT)
Dept: FAMILY MEDICINE | Facility: CLINIC | Age: 71
End: 2021-06-23
Payer: MEDICARE

## 2021-06-23 DIAGNOSIS — I10 BENIGN ESSENTIAL HYPERTENSION: Primary | ICD-10-CM

## 2021-06-23 DIAGNOSIS — F33.0 MAJOR DEPRESSIVE DISORDER, RECURRENT EPISODE, MILD (H): ICD-10-CM

## 2021-06-23 PROCEDURE — 99441 PR PHYSICIAN TELEPHONE EVALUATION 5-10 MIN: CPT | Mod: 95 | Performed by: PHYSICIAN ASSISTANT

## 2021-06-23 RX ORDER — CITALOPRAM HYDROBROMIDE 20 MG/1
20 TABLET ORAL DAILY
Qty: 90 TABLET | Refills: 1 | Status: SHIPPED | OUTPATIENT
Start: 2021-06-23 | End: 2021-07-21

## 2021-06-23 ASSESSMENT — ANXIETY QUESTIONNAIRES: GAD7 TOTAL SCORE: 6

## 2021-06-23 ASSESSMENT — PATIENT HEALTH QUESTIONNAIRE - PHQ9: SUM OF ALL RESPONSES TO PHQ QUESTIONS 1-9: 1

## 2021-06-23 NOTE — PROGRESS NOTES
Molly is a 70 year old who is being evaluated via a billable telephone visit.      What phone number would you like to be contacted at? [phone]  How would you like to obtain your AVS? MyChart    Assessment & Plan       ICD-10-CM    1. Benign essential hypertension  I10 **Basic metabolic panel FUTURE anytime   2. Major depressive disorder, recurrent episode, mild (H)  F33.0 citalopram (CELEXA) 20 MG tablet   medical conditions are stable. meds refilled.  We will do a checkup in 6 months at that point we will recheck her blood pressure and her labs.     Return in about 6 months (around 2021) for recheck.    Gil Dumas PA-C  [facility]   Molly is a 70 year old who presents for the following health issues     History of Present Illness       Mental Health Follow-up:  Patient presents to follow-up on Depression & Anxiety.Patient's depression since last visit has been:  Better  The patient is not having other symptoms associated with depression.  Patient's anxiety since last visit has been:  Better  The patient is not having other symptoms associated with anxiety.  Any significant life events: No  Patient is not feeling anxious or having panic attacks.  Patient has no concerns about alcohol or drug use.     Social History  Tobacco Use    Smoking status: Former Smoker      Quit date: 1985      Years since quittin.4    Smokeless tobacco: Former User  Alcohol use: Yes    Comment: OCCASIONAL  Drug use: No      Today's PHQ-9         PHQ-9 Total Score:     (P) 1   PHQ-9 Q9 Thoughts of better off dead/self-harm past 2 weeks :   (P) Not at all   Thoughts of suicide or self harm:      Self-harm Plan:        Self-harm Action:          Safety concerns for self or others:          She states she is doing very well switching from Effexor to citalopram.  She felt feels like she is less anxious on the citalopram and sleeping well at night and very pleased at this  time.      Review of Systems   Constitutional, HEENT, cardiovascular, pulmonary, gi and gu systems are negative, except as otherwise noted.      Objective           Vitals:  No vitals were obtained today due to virtual visit.    Physical Exam   healthy, alert and no distress  PSYCH: Alert and oriented times 3; coherent speech, normal   rate and volume, able to articulate logical thoughts, able   to abstract reason, no tangential thoughts, no hallucinations   or delusions  Her affect is normal  RESP: No cough, no audible wheezing, able to talk in full sentences  Remainder of exam unable to be completed due to telephone visits          Phone call duration: 5 minutes  Answers for HPI/ROS submitted by the patient on 6/22/2021   Chronic problems general questions HPI Form  PHQ9 TOTAL SCORE: 1  KRYSTYNA 7 TOTAL SCORE: 6

## 2021-07-21 DIAGNOSIS — F33.0 MAJOR DEPRESSIVE DISORDER, RECURRENT EPISODE, MILD (H): ICD-10-CM

## 2021-07-21 RX ORDER — CITALOPRAM HYDROBROMIDE 20 MG/1
20 TABLET ORAL DAILY
Qty: 90 TABLET | Refills: 1 | Status: SHIPPED | OUTPATIENT
Start: 2021-07-21 | End: 2021-10-26

## 2021-07-21 NOTE — TELEPHONE ENCOUNTER
"CRM request. Med and pharm pended. Kelli ABREU -     \"Topic: Procedural Question     Hello,      My [facility] mail order is saying they need a script from you for my Citalopram 20mg.   Thank you, Molly Ospina\"  "

## 2021-09-26 ENCOUNTER — HEALTH MAINTENANCE LETTER (OUTPATIENT)
Age: 71
End: 2021-09-26

## 2021-10-24 ENCOUNTER — LAB (OUTPATIENT)
Dept: LAB | Facility: CLINIC | Age: 71
End: 2021-10-24
Payer: MEDICARE

## 2021-10-24 DIAGNOSIS — E03.9 HYPOTHYROIDISM, UNSPECIFIED TYPE: ICD-10-CM

## 2021-10-24 DIAGNOSIS — I10 BENIGN ESSENTIAL HYPERTENSION: ICD-10-CM

## 2021-10-24 PROCEDURE — 84443 ASSAY THYROID STIM HORMONE: CPT

## 2021-10-24 PROCEDURE — 80048 BASIC METABOLIC PNL TOTAL CA: CPT

## 2021-10-24 PROCEDURE — 36415 COLL VENOUS BLD VENIPUNCTURE: CPT

## 2021-10-25 LAB
ANION GAP SERPL CALCULATED.3IONS-SCNC: 5 MMOL/L (ref 3–14)
BUN SERPL-MCNC: 14 MG/DL (ref 7–30)
CALCIUM SERPL-MCNC: 9.5 MG/DL (ref 8.5–10.1)
CHLORIDE BLD-SCNC: 102 MMOL/L (ref 94–109)
CO2 SERPL-SCNC: 27 MMOL/L (ref 20–32)
CREAT SERPL-MCNC: 0.69 MG/DL (ref 0.52–1.04)
GFR SERPL CREATININE-BSD FRML MDRD: 88 ML/MIN/1.73M2
GLUCOSE BLD-MCNC: 101 MG/DL (ref 70–99)
POTASSIUM BLD-SCNC: 4.7 MMOL/L (ref 3.4–5.3)
SODIUM SERPL-SCNC: 134 MMOL/L (ref 133–144)

## 2021-10-26 ENCOUNTER — OFFICE VISIT (OUTPATIENT)
Dept: FAMILY MEDICINE | Facility: CLINIC | Age: 71
End: 2021-10-26
Payer: MEDICARE

## 2021-10-26 VITALS
DIASTOLIC BLOOD PRESSURE: 69 MMHG | BODY MASS INDEX: 31.46 KG/M2 | TEMPERATURE: 97.2 F | WEIGHT: 179 LBS | OXYGEN SATURATION: 97 % | HEART RATE: 70 BPM | SYSTOLIC BLOOD PRESSURE: 111 MMHG

## 2021-10-26 DIAGNOSIS — E78.1 HIGH TRIGLYCERIDES: ICD-10-CM

## 2021-10-26 DIAGNOSIS — E03.9 HYPOTHYROIDISM, UNSPECIFIED TYPE: ICD-10-CM

## 2021-10-26 DIAGNOSIS — I10 BENIGN ESSENTIAL HYPERTENSION: ICD-10-CM

## 2021-10-26 DIAGNOSIS — Z23 NEED FOR VACCINATION: ICD-10-CM

## 2021-10-26 DIAGNOSIS — F33.0 MAJOR DEPRESSIVE DISORDER, RECURRENT EPISODE, MILD (H): Primary | ICD-10-CM

## 2021-10-26 LAB — TSH SERPL DL<=0.005 MIU/L-ACNC: 3.12 MU/L (ref 0.4–4)

## 2021-10-26 PROCEDURE — 99214 OFFICE O/P EST MOD 30 MIN: CPT | Mod: 25 | Performed by: PHYSICIAN ASSISTANT

## 2021-10-26 PROCEDURE — 90471 IMMUNIZATION ADMIN: CPT | Performed by: PHYSICIAN ASSISTANT

## 2021-10-26 PROCEDURE — 90715 TDAP VACCINE 7 YRS/> IM: CPT | Performed by: PHYSICIAN ASSISTANT

## 2021-10-26 RX ORDER — LEVOTHYROXINE SODIUM 75 UG/1
75 TABLET ORAL DAILY
Qty: 90 TABLET | Refills: 3 | Status: SHIPPED | OUTPATIENT
Start: 2021-10-26 | End: 2022-05-24

## 2021-10-26 RX ORDER — METOPROLOL SUCCINATE 50 MG/1
50 TABLET, EXTENDED RELEASE ORAL DAILY
Qty: 90 TABLET | Refills: 1 | Status: SHIPPED | OUTPATIENT
Start: 2021-10-26 | End: 2022-05-24

## 2021-10-26 RX ORDER — LISINOPRIL 10 MG/1
10 TABLET ORAL DAILY
Qty: 90 TABLET | Refills: 1 | Status: SHIPPED | OUTPATIENT
Start: 2021-10-26 | End: 2022-05-20

## 2021-10-26 RX ORDER — ROSUVASTATIN CALCIUM 10 MG/1
10 TABLET, COATED ORAL DAILY
Qty: 90 TABLET | Refills: 1 | Status: SHIPPED | OUTPATIENT
Start: 2021-10-26 | End: 2022-05-20

## 2021-10-26 RX ORDER — CITALOPRAM HYDROBROMIDE 20 MG/1
20 TABLET ORAL DAILY
Qty: 90 TABLET | Refills: 1 | Status: SHIPPED | OUTPATIENT
Start: 2021-10-26 | End: 2022-05-24

## 2021-10-26 RX ORDER — HYDROCHLOROTHIAZIDE 25 MG/1
25 TABLET ORAL DAILY
Qty: 90 TABLET | Refills: 1 | Status: SHIPPED | OUTPATIENT
Start: 2021-10-26 | End: 2022-05-24

## 2021-10-26 RX ORDER — CYCLOSPORINE 0.5 MG/ML
EMULSION OPHTHALMIC
COMMUNITY
Start: 2021-10-11

## 2021-10-26 ASSESSMENT — ANXIETY QUESTIONNAIRES
7. FEELING AFRAID AS IF SOMETHING AWFUL MIGHT HAPPEN: SEVERAL DAYS
1. FEELING NERVOUS, ANXIOUS, OR ON EDGE: NOT AT ALL
8. IF YOU CHECKED OFF ANY PROBLEMS, HOW DIFFICULT HAVE THESE MADE IT FOR YOU TO DO YOUR WORK, TAKE CARE OF THINGS AT HOME, OR GET ALONG WITH OTHER PEOPLE?: NOT DIFFICULT AT ALL
GAD7 TOTAL SCORE: 6
GAD7 TOTAL SCORE: 6
7. FEELING AFRAID AS IF SOMETHING AWFUL MIGHT HAPPEN: SEVERAL DAYS
6. BECOMING EASILY ANNOYED OR IRRITABLE: SEVERAL DAYS
GAD7 TOTAL SCORE: 6
2. NOT BEING ABLE TO STOP OR CONTROL WORRYING: SEVERAL DAYS
5. BEING SO RESTLESS THAT IT IS HARD TO SIT STILL: SEVERAL DAYS
4. TROUBLE RELAXING: SEVERAL DAYS
3. WORRYING TOO MUCH ABOUT DIFFERENT THINGS: SEVERAL DAYS

## 2021-10-26 ASSESSMENT — PATIENT HEALTH QUESTIONNAIRE - PHQ9
SUM OF ALL RESPONSES TO PHQ QUESTIONS 1-9: 2
SUM OF ALL RESPONSES TO PHQ QUESTIONS 1-9: 2

## 2021-10-26 ASSESSMENT — PAIN SCALES - GENERAL: PAINLEVEL: NO PAIN (0)

## 2021-10-26 NOTE — PROGRESS NOTES
"  Assessment & Plan       ICD-10-CM    1. Major depressive disorder, recurrent episode, mild (H)  F33.0 citalopram (CELEXA) 20 MG tablet   2. Benign essential hypertension  I10 hydrochlorothiazide (HYDRODIURIL) 25 MG tablet     lisinopril (ZESTRIL) 10 MG tablet     metoprolol succinate ER (TOPROL-XL) 50 MG 24 hr tablet   3. Hypothyroidism, unspecified type  E03.9 levothyroxine (SYNTHROID/LEVOTHROID) 75 MCG tablet     TSH with free T4 reflex   4. High triglycerides  E78.1 rosuvastatin (CRESTOR) 10 MG tablet   5. Need for vaccination  Z23 TDAP VACCINE (Adacel, Boostrix)  [[id number]]   medical conditions are stable. meds refilled.  Work on Healthy diet and exercise. Getting heart rate elevated for 30 mins most days of week.  Recheck 6 months.     BMI:   Estimated body mass index is 31.46 kg/m  as calculated from the following:    Height as of 21: 1.607 m (5' 3.25\").    Weight as of this encounter: 81.2 kg (179 lb).   Weight management plan: Discussed healthy diet and exercise guidelines      Return in about 6 months (around 2022) for recheck.    Gil Dumas PA-C  [facility] is a 70 year old who presents for the following health issues     History of Present Illness       Mental Health Follow-up:  Patient presents to follow-up on Depression & Anxiety.Patient's depression since last visit has been:  Good  The patient is not having other symptoms associated with depression.  Patient's anxiety since last visit has been:  Good  The patient is not having other symptoms associated with anxiety.  Any significant life events: No  Patient is not feeling anxious or having panic attacks.  Patient has no concerns about alcohol or drug use.     Social History  Tobacco Use    Smoking status: Former Smoker      Packs/day: 0.00      Years: 0.00      Pack years: 0      Types: Cigarettes      Start date: 1965      Quit date: 1986      Years since quittin.0    " Smokeless tobacco: Former User  Alcohol use: Yes    Comment: Occasional  Drug use: No      Today's PHQ-9         PHQ-9 Total Score:     (P) 2   PHQ-9 Q9 Thoughts of better off dead/self-harm past 2 weeks :   (P) Not at all   Thoughts of suicide or self harm:      Self-harm Plan:        Self-harm Action:          Safety concerns for self or others:               Hyperlipidemia Follow-Up      Are you regularly taking any medication or supplement to lower your cholesterol?   Yes- Crestor    Are you having muscle aches or other side effects that you think could be caused by your cholesterol lowering medication?  No    Hypertension Follow-up      Do you check your blood pressure regularly outside of the clinic? Yes     Are you following a low salt diet? Yes    Are your blood pressures ever more than 140 on the top number (systolic) OR more   than 90 on the bottom number (diastolic), for example 140/90? Yes, sometimes       Social History     Tobacco Use     Smoking status: Former Smoker     Packs/day: 0.00     Years: 0.00     Pack years: 0.00     Types: Cigarettes     Start date: 1965     Quit date: 1986     Years since quittin.0     Smokeless tobacco: Former User   Substance Use Topics     Alcohol use: Yes     Comment: Occasional     Drug use: No     PHQ 2021 2021 10/26/2021   PHQ-9 Total Score 4 1 2   Q9: Thoughts of better off dead/self-harm past 2 weeks Not at all Not at all Not at all     KRYSTYNA-7 SCORE 2021 2021 10/26/2021   Total Score - - -   Total Score 10 (moderate anxiety) 6 (mild anxiety) 6 (mild anxiety)   Total Score 10 6 6       Suicide Assessment Five-step Evaluation and Treatment (SAFE-T)    Hypothyroidism Follow-up      Since last visit, patient describes the following symptoms: Weight stable, no hair loss, no skin changes, no constipation, no loose stools      Review of Systems   Constitutional, HEENT, cardiovascular, pulmonary, gi and gu systems are negative, except as  otherwise noted.      Objective    /69   Pulse 70   Temp 97.2  F (36.2  C) (Tympanic)   Wt 81.2 kg (179 lb)   SpO2 97%   BMI 31.46 kg/m    Body mass index is 31.46 kg/m .  Physical Exam   GENERAL: healthy, alert and no distress  EYES: Eyes grossly normal to inspection, PERRL and conjunctivae and sclerae normal  HENT: ear canals and TM's normal, nose and mouth without ulcers or lesions  NECK: no adenopathy, no asymmetry, masses, or scars and thyroid normal to palpation  RESP: lungs clear to auscultation - no rales, rhonchi or wheezes  CV: regular rate and rhythm, normal S1 S2, no S3 or S4, no murmur, click or rub, no peripheral edema and peripheral pulses strong  MS: no gross musculoskeletal defects noted, no edema  SKIN: no suspicious lesions or rashes  NEURO: Normal strength and tone, mentation intact and speech normal  PSYCH: mentation appears normal, affect normal/bright          Answers for HPI/ROS submitted by the patient on 10/26/2021  PHQ9 TOTAL SCORE: 2  KRYSTYNA 7 TOTAL SCORE: 6

## 2021-10-27 ASSESSMENT — ANXIETY QUESTIONNAIRES: GAD7 TOTAL SCORE: 6

## 2022-02-17 PROBLEM — Z71.89 ADVANCED DIRECTIVES, COUNSELING/DISCUSSION: Status: ACTIVE | Noted: 2018-07-19

## 2022-04-14 ENCOUNTER — ANCILLARY PROCEDURE (OUTPATIENT)
Dept: MAMMOGRAPHY | Facility: CLINIC | Age: 72
End: 2022-04-14
Attending: PHYSICIAN ASSISTANT
Payer: MEDICARE

## 2022-04-14 DIAGNOSIS — Z12.31 VISIT FOR SCREENING MAMMOGRAM: ICD-10-CM

## 2022-04-14 PROCEDURE — 77063 BREAST TOMOSYNTHESIS BI: CPT | Mod: TC | Performed by: RADIOLOGY

## 2022-04-14 PROCEDURE — 77067 SCR MAMMO BI INCL CAD: CPT | Mod: TC | Performed by: RADIOLOGY

## 2022-05-18 DIAGNOSIS — I10 BENIGN ESSENTIAL HYPERTENSION: ICD-10-CM

## 2022-05-18 DIAGNOSIS — E78.1 HIGH TRIGLYCERIDES: ICD-10-CM

## 2022-05-19 ASSESSMENT — PATIENT HEALTH QUESTIONNAIRE - PHQ9
SUM OF ALL RESPONSES TO PHQ QUESTIONS 1-9: 2
10. IF YOU CHECKED OFF ANY PROBLEMS, HOW DIFFICULT HAVE THESE PROBLEMS MADE IT FOR YOU TO DO YOUR WORK, TAKE CARE OF THINGS AT HOME, OR GET ALONG WITH OTHER PEOPLE: NOT DIFFICULT AT ALL
SUM OF ALL RESPONSES TO PHQ QUESTIONS 1-9: 2

## 2022-05-20 RX ORDER — ROSUVASTATIN CALCIUM 10 MG/1
TABLET, COATED ORAL
Qty: 90 TABLET | Refills: 3 | Status: SHIPPED | OUTPATIENT
Start: 2022-05-20 | End: 2022-10-27

## 2022-05-20 RX ORDER — LISINOPRIL 10 MG/1
TABLET ORAL
Qty: 90 TABLET | Refills: 0 | Status: SHIPPED | OUTPATIENT
Start: 2022-05-20 | End: 2022-05-24

## 2022-05-20 NOTE — TELEPHONE ENCOUNTER
"Routing refill request to provider for review/approval because:  Requested Prescriptions   Pending Prescriptions Disp Refills    rosuvastatin (CRESTOR) 10 MG tablet [Pharmacy Med Name: ROSUVASTATIN TABS 10MG] 90 tablet 3     Sig: TAKE 1 TABLET DAILY        Statins Protocol Failed - 5/18/2022  7:22 PM        Failed - LDL on file in past 12 months     Recent Labs   Lab Test 04/26/21  1431   LDL 81               Passed - No abnormal creatine kinase in past 12 months     No lab results found.             Passed - Recent (12 mo) or future (30 days) visit within the authorizing provider's specialty     Patient has had an office visit with the authorizing provider or a provider within the authorizing providers department within the previous 12 mos or has a future within next 30 days. See \"Patient Info\" tab in inbasket, or \"Choose Columns\" in Meds & Orders section of the refill encounter.              Passed - Medication is active on med list        Passed - Patient is age 18 or older        Passed - No active pregnancy on record        Passed - No positive pregnancy test in past 12 months          Signed Prescriptions Disp Refills    lisinopril (ZESTRIL) 10 MG tablet 90 tablet 0     Sig: TAKE 1 TABLET DAILY        ACE Inhibitors (Including Combos) Protocol Passed - 5/18/2022  7:22 PM        Passed - Blood pressure under 140/90 in past 12 months       BP Readings from Last 3 Encounters:   10/26/21 111/69   04/28/21 136/77   05/13/20 132/74                 Passed - Recent (12 mo) or future (30 days) visit within the authorizing provider's specialty     Patient has had an office visit with the authorizing provider or a provider within the authorizing providers department within the previous 12 mos or has a future within next 30 days. See \"Patient Info\" tab in inbasket, or \"Choose Columns\" in Meds & Orders section of the refill encounter.              Passed - Medication is active on med list        Passed - Patient is age 18 " or older        Passed - No active pregnancy on record        Passed - Normal serum creatinine on file in past 12 months     Recent Labs   Lab Test 10/24/21  1227   CR 0.69       Ok to refill medication if creatinine is low          Passed - Normal serum potassium on file in past 12 months     Recent Labs   Lab Test 10/24/21  1227   POTASSIUM 4.7               Passed - No positive pregnancy test within past 12 months              Elisabet ZHANGN, RN

## 2022-05-24 ENCOUNTER — OFFICE VISIT (OUTPATIENT)
Dept: FAMILY MEDICINE | Facility: CLINIC | Age: 72
End: 2022-05-24
Payer: MEDICARE

## 2022-05-24 VITALS
HEIGHT: 64 IN | DIASTOLIC BLOOD PRESSURE: 78 MMHG | HEART RATE: 85 BPM | TEMPERATURE: 96.5 F | WEIGHT: 181 LBS | SYSTOLIC BLOOD PRESSURE: 135 MMHG | OXYGEN SATURATION: 97 % | BODY MASS INDEX: 30.9 KG/M2 | RESPIRATION RATE: 16 BRPM

## 2022-05-24 DIAGNOSIS — E78.1 HIGH TRIGLYCERIDES: ICD-10-CM

## 2022-05-24 DIAGNOSIS — D22.9 SKIN MOLE: ICD-10-CM

## 2022-05-24 DIAGNOSIS — R73.9 HYPERGLYCEMIA: ICD-10-CM

## 2022-05-24 DIAGNOSIS — E03.9 HYPOTHYROIDISM, UNSPECIFIED TYPE: ICD-10-CM

## 2022-05-24 DIAGNOSIS — F33.0 MAJOR DEPRESSIVE DISORDER, RECURRENT EPISODE, MILD (H): Primary | ICD-10-CM

## 2022-05-24 DIAGNOSIS — I10 BENIGN ESSENTIAL HYPERTENSION: ICD-10-CM

## 2022-05-24 LAB
ALBUMIN SERPL-MCNC: 4 G/DL (ref 3.4–5)
ALP SERPL-CCNC: 78 U/L (ref 40–150)
ALT SERPL W P-5'-P-CCNC: 36 U/L (ref 0–50)
ANION GAP SERPL CALCULATED.3IONS-SCNC: 9 MMOL/L (ref 3–14)
AST SERPL W P-5'-P-CCNC: 20 U/L (ref 0–45)
BILIRUB SERPL-MCNC: 0.4 MG/DL (ref 0.2–1.3)
BUN SERPL-MCNC: 18 MG/DL (ref 7–30)
CALCIUM SERPL-MCNC: 9.1 MG/DL (ref 8.5–10.1)
CHLORIDE BLD-SCNC: 105 MMOL/L (ref 94–109)
CHOLEST SERPL-MCNC: 151 MG/DL
CO2 SERPL-SCNC: 23 MMOL/L (ref 20–32)
CREAT SERPL-MCNC: 0.7 MG/DL (ref 0.52–1.04)
FASTING STATUS PATIENT QL REPORTED: NO
GFR SERPL CREATININE-BSD FRML MDRD: >90 ML/MIN/1.73M2
GLUCOSE BLD-MCNC: 158 MG/DL (ref 70–99)
HDLC SERPL-MCNC: 40 MG/DL
LDLC SERPL CALC-MCNC: 43 MG/DL
NONHDLC SERPL-MCNC: 111 MG/DL
POTASSIUM BLD-SCNC: 3.7 MMOL/L (ref 3.4–5.3)
PROT SERPL-MCNC: 6.9 G/DL (ref 6.8–8.8)
SODIUM SERPL-SCNC: 137 MMOL/L (ref 133–144)
T4 FREE SERPL-MCNC: 1.02 NG/DL (ref 0.76–1.46)
TRIGL SERPL-MCNC: 340 MG/DL
TSH SERPL DL<=0.005 MIU/L-ACNC: 4.08 MU/L (ref 0.4–4)

## 2022-05-24 PROCEDURE — 90732 PPSV23 VACC 2 YRS+ SUBQ/IM: CPT | Performed by: PHYSICIAN ASSISTANT

## 2022-05-24 PROCEDURE — 99214 OFFICE O/P EST MOD 30 MIN: CPT | Mod: 25 | Performed by: PHYSICIAN ASSISTANT

## 2022-05-24 PROCEDURE — 80061 LIPID PANEL: CPT | Performed by: PHYSICIAN ASSISTANT

## 2022-05-24 PROCEDURE — 84439 ASSAY OF FREE THYROXINE: CPT | Performed by: PHYSICIAN ASSISTANT

## 2022-05-24 PROCEDURE — 80053 COMPREHEN METABOLIC PANEL: CPT | Performed by: PHYSICIAN ASSISTANT

## 2022-05-24 PROCEDURE — 84443 ASSAY THYROID STIM HORMONE: CPT | Performed by: PHYSICIAN ASSISTANT

## 2022-05-24 PROCEDURE — G0009 ADMIN PNEUMOCOCCAL VACCINE: HCPCS | Performed by: PHYSICIAN ASSISTANT

## 2022-05-24 PROCEDURE — 36415 COLL VENOUS BLD VENIPUNCTURE: CPT | Performed by: PHYSICIAN ASSISTANT

## 2022-05-24 RX ORDER — LISINOPRIL 10 MG/1
10 TABLET ORAL DAILY
Qty: 90 TABLET | Refills: 1 | Status: SHIPPED | OUTPATIENT
Start: 2022-05-24 | End: 2022-09-20

## 2022-05-24 RX ORDER — CITALOPRAM HYDROBROMIDE 20 MG/1
20 TABLET ORAL DAILY
Qty: 90 TABLET | Refills: 1 | Status: SHIPPED | OUTPATIENT
Start: 2022-05-24 | End: 2022-06-28

## 2022-05-24 RX ORDER — METOPROLOL SUCCINATE 50 MG/1
50 TABLET, EXTENDED RELEASE ORAL DAILY
Qty: 90 TABLET | Refills: 1 | Status: SHIPPED | OUTPATIENT
Start: 2022-05-24 | End: 2022-06-28

## 2022-05-24 RX ORDER — HYDROCHLOROTHIAZIDE 25 MG/1
25 TABLET ORAL DAILY
Qty: 90 TABLET | Refills: 1 | Status: SHIPPED | OUTPATIENT
Start: 2022-05-24 | End: 2022-09-20

## 2022-05-24 RX ORDER — LEVOTHYROXINE SODIUM 75 UG/1
75 TABLET ORAL DAILY
Qty: 90 TABLET | Refills: 3 | Status: SHIPPED | OUTPATIENT
Start: 2022-05-24 | End: 2022-10-27

## 2022-05-24 ASSESSMENT — PATIENT HEALTH QUESTIONNAIRE - PHQ9
SUM OF ALL RESPONSES TO PHQ QUESTIONS 1-9: 2
10. IF YOU CHECKED OFF ANY PROBLEMS, HOW DIFFICULT HAVE THESE PROBLEMS MADE IT FOR YOU TO DO YOUR WORK, TAKE CARE OF THINGS AT HOME, OR GET ALONG WITH OTHER PEOPLE: NOT DIFFICULT AT ALL

## 2022-05-24 ASSESSMENT — PAIN SCALES - GENERAL: PAINLEVEL: NO PAIN (0)

## 2022-05-24 NOTE — PROGRESS NOTES
"  Assessment & Plan       ICD-10-CM    1. Major depressive disorder, recurrent episode, mild (H)  F33.0 citalopram (CELEXA) 20 MG tablet   2. Benign essential hypertension  I10 hydrochlorothiazide (HYDRODIURIL) 25 MG tablet     lisinopril (ZESTRIL) 10 MG tablet     metoprolol succinate ER (TOPROL XL) 50 MG 24 hr tablet     Comprehensive metabolic panel (BMP + Alb, Alk Phos, ALT, AST, Total. Bili, TP)     Comprehensive metabolic panel (BMP + Alb, Alk Phos, ALT, AST, Total. Bili, TP)   3. Hypothyroidism, unspecified type  E03.9 TSH with free T4 reflex     levothyroxine (SYNTHROID/LEVOTHROID) 75 MCG tablet     TSH with free T4 reflex   4. High triglycerides  E78.1 Lipid panel reflex to direct LDL Fasting     Lipid panel reflex to direct LDL Fasting   5. Skin mole  D22.9 Adult Dermatology Referral     medical conditions are stable. meds refilled.  Work on Healthy diet and exercise. Getting heart rate elevated for 30 mins most days of week.  Labs pending   Follow up  With derm for skin screening.     BMI:   Estimated body mass index is 31.31 kg/m  as calculated from the following:    Height as of this encounter: 1.619 m (5' 3.75\").    Weight as of this encounter: 82.1 kg (181 lb).   Weight management plan: Discussed healthy diet and exercise guidelines    Return in about 6 months (around 11/24/2022) for recheck.    SALAS Glez [facility] is a 71 year old who presents for the following health issues  accompanied by her self.    History of Present Illness       She eats 0-1 servings of fruits and vegetables daily.She consumes 0 sweetened beverage(s) daily.She exercises with enough effort to increase her heart rate 9 or less minutes per day.  She exercises with enough effort to increase her heart rate 3 or less days per week.   She is taking medications regularly.    Today's PHQ-9         PHQ-9 Total Score: 2    PHQ-9 Q9 Thoughts of better off dead/self-harm past 2 " weeks :   Not at all    How difficult have these problems made it for you to do your work, take care of things at home, or get along with other people: Not difficult at all       Hyperlipidemia Follow-Up      Are you regularly taking any medication or supplement to lower your cholesterol?   Yes- crestor    Are you having muscle aches or other side effects that you think could be caused by your cholesterol lowering medication?  No    Hypertension Follow-up      Do you check your blood pressure regularly outside of the clinic? Yes     Are you following a low salt diet? Yes    Are your blood pressures ever more than 140 on the top number (systolic) OR more   than 90 on the bottom number (diastolic), for example 140/90? No    Depression Followup    How are you doing with your depression since your last visit? stable    Are you having other symptoms that might be associated with depression? No    Have you had a significant life event?  No     Are you feeling anxious or having panic attacks?   No    Do you have any concerns with your use of alcohol or other drugs? No    Social History     Tobacco Use     Smoking status: Former Smoker     Packs/day: 0.00     Years: 0.00     Pack years: 0.00     Types: Cigarettes     Start date: 1965     Quit date: 1986     Years since quittin.5     Smokeless tobacco: Former User   Vaping Use     Vaping Use: Never used   Substance Use Topics     Alcohol use: Yes     Comment: Occasional     Drug use: No     PHQ 2021 10/26/2021 2022   PHQ-9 Total Score 1 2 2   Q9: Thoughts of better off dead/self-harm past 2 weeks Not at all Not at all Not at all     KRYSTYNA-7 SCORE 2021 2021 10/26/2021   Total Score - - -   Total Score 10 (moderate anxiety) 6 (mild anxiety) 6 (mild anxiety)   Total Score 10 6 6     Hypothyroidism Follow-up      Since last visit, patient describes the following symptoms: Weight stable, no hair loss, no skin changes, no constipation, no loose  "stools      Review of Systems   Constitutional, HEENT, cardiovascular, pulmonary, gi and gu systems are negative, except as otherwise noted.      Objective    /78   Pulse 85   Temp (!) 96.5  F (35.8  C) (Tympanic)   Resp 16   Ht 1.619 m (5' 3.75\")   Wt 82.1 kg (181 lb)   SpO2 97%   BMI 31.31 kg/m    Body mass index is 31.31 kg/m .  Physical Exam   GENERAL: healthy, alert and no distress  EYES: Eyes grossly normal to inspection, PERRL and conjunctivae and sclerae normal  HENT: ear canals and TM's normal, nose and mouth without ulcers or lesions  NECK: no adenopathy, no asymmetry, masses, or scars and thyroid normal to palpation  RESP: lungs clear to auscultation - no rales, rhonchi or wheezes  CV: regular rate and rhythm, normal S1 S2, no S3 or S4, no murmur, click or rub, no peripheral edema and peripheral pulses strong  ABDOMEN: soft, nontender, no hepatosplenomegaly, no masses and bowel sounds normal  MS: no gross musculoskeletal defects noted, no edema  SKIN: no suspicious lesions or rashes  NEURO: Normal strength and tone, mentation intact and speech normal  PSYCH: mentation appears normal, affect normal/bright    Labs pending       "

## 2022-07-03 ENCOUNTER — HEALTH MAINTENANCE LETTER (OUTPATIENT)
Age: 72
End: 2022-07-03

## 2022-08-16 DIAGNOSIS — I10 BENIGN ESSENTIAL HYPERTENSION: ICD-10-CM

## 2022-08-17 RX ORDER — HYDROCHLOROTHIAZIDE 25 MG/1
TABLET ORAL
Qty: 90 TABLET | Refills: 3 | OUTPATIENT
Start: 2022-08-17

## 2022-08-17 RX ORDER — LISINOPRIL 10 MG/1
TABLET ORAL
Qty: 90 TABLET | Refills: 3 | OUTPATIENT
Start: 2022-08-17

## 2022-09-14 ENCOUNTER — OFFICE VISIT (OUTPATIENT)
Dept: DERMATOLOGY | Facility: CLINIC | Age: 72
End: 2022-09-14
Payer: MEDICARE

## 2022-09-14 VITALS — HEIGHT: 64 IN | WEIGHT: 180 LBS | BODY MASS INDEX: 30.73 KG/M2

## 2022-09-14 DIAGNOSIS — L82.0 INFLAMED SEBORRHEIC KERATOSIS: ICD-10-CM

## 2022-09-14 DIAGNOSIS — L81.4 LENTIGO: ICD-10-CM

## 2022-09-14 DIAGNOSIS — L82.1 SEBORRHEIC KERATOSIS: Primary | ICD-10-CM

## 2022-09-14 DIAGNOSIS — D22.9 MULTIPLE BENIGN NEVI: ICD-10-CM

## 2022-09-14 DIAGNOSIS — L91.8 INFLAMED SKIN TAG: ICD-10-CM

## 2022-09-14 DIAGNOSIS — D18.01 CHERRY ANGIOMA: ICD-10-CM

## 2022-09-14 PROCEDURE — 17110 DESTRUCTION B9 LES UP TO 14: CPT | Performed by: PHYSICIAN ASSISTANT

## 2022-09-14 PROCEDURE — 99213 OFFICE O/P EST LOW 20 MIN: CPT | Mod: 25 | Performed by: PHYSICIAN ASSISTANT

## 2022-09-14 PROCEDURE — 11200 RMVL SKIN TAGS UP TO&INC 15: CPT | Mod: 59 | Performed by: PHYSICIAN ASSISTANT

## 2022-09-14 ASSESSMENT — PAIN SCALES - GENERAL: PAINLEVEL: NO PAIN (0)

## 2022-09-14 NOTE — PROGRESS NOTES
Molly Ospina is an extremely pleasant 71 year old year old female patient here today for skin check. She notes brown scaly areas on body and face that are catching/itching. She denies any painful or bleeding skin lesions.  Patient has no other skin complaints today.  Remainder of the HPI, Meds, PMH, Allergies, FH, and SH was reviewed in chart.    Pertinent Hx:  No personal history of skin cancer   Past Medical History:   Diagnosis Date     Actinic keratosis      AK (actinic keratosis) 10/10/2012     Anxiety state, unspecified      Depressive disorder      High triglycerides      HTN      Mild major depression (H)      PFO (patent foramen ovale)      Unspecified hypothyroidism        Past Surgical History:   Procedure Laterality Date     APPENDECTOMY  01/1992    With hyst     BACK SURGERY  05/19/2020     COLONOSCOPY  9/24/2020     COSMETIC SURGERY  9/20/21    Liposuction     HYSTERECTOMY, SEGUNDO       LASIK       KS SPINE/LUMBAR DISK SURGERY  05/2020     TONSILLECTOMY & ADENOIDECTOMY          Family History   Problem Relation Age of Onset     Breast Cancer Mother         Age 37 radical mastectomy     Heart Disease Mother      Lipids Mother      Osteoporosis Mother         Scoliosis     Hypertension Mother      C.A.D. Mother      Cancer Mother         melanoma on the leg     Hyperlipidemia Mother      Heart Disease Father      Lipids Father      Thyroid Disease Father      Hypertension Father      C.A.D. Father      Cardiovascular Father      Cancer Father         arm skin cancer     Respiratory Father         pulmonary fibrosis - on O2     Hyperlipidemia Father      Breast Cancer Maternal Grandmother         Lumpectomy approx age 70     Alzheimer Disease Maternal Grandmother      Diabetes Maternal Grandmother      Heart Disease Maternal Grandfather      Cancer - colorectal Paternal Grandmother      Heart Disease Paternal Grandfather      Lipids Son      Colon Cancer Son      Lipids Son      Diabetes Brother       Diabetes Brother         Type 1     Colon Cancer Son         Bhavesh age46       Social History     Socioeconomic History     Marital status:      Spouse name: Not on file     Number of children: Not on file     Years of education: Not on file     Highest education level: Not on file   Occupational History     Employer: RETIRED   Tobacco Use     Smoking status: Former Smoker     Packs/day: 0.00     Years: 0.00     Pack years: 0.00     Types: Cigarettes     Start date: 1965     Quit date: 1986     Years since quittin.8     Smokeless tobacco: Former User   Vaping Use     Vaping Use: Never used   Substance and Sexual Activity     Alcohol use: Yes     Comment: Occasional     Drug use: No     Sexual activity: Not Currently     Partners: Male     Birth control/protection: Post-menopausal, None     Comment:  medical issues   Other Topics Concern     Parent/sibling w/ CABG, MI or angioplasty before 65F 55M? Yes     Comment: Father age 51      Service No     Blood Transfusions No     Caffeine Concern No     Occupational Exposure No     Hobby Hazards No     Sleep Concern No     Stress Concern No     Weight Concern No     Special Diet No     Back Care No     Exercise No     Bike Helmet No     Seat Belt No     Self-Exams No   Social History Narrative     Not on file     Social Determinants of Health     Financial Resource Strain: Not on file   Food Insecurity: Not on file   Transportation Needs: Not on file   Physical Activity: Not on file   Stress: Not on file   Social Connections: Not on file   Intimate Partner Violence: Not on file   Housing Stability: Not on file       Outpatient Encounter Medications as of 2022   Medication Sig Dispense Refill     citalopram (CELEXA) 20 MG tablet TAKE 1 TABLET DAILY 90 tablet 1     hydrochlorothiazide (HYDRODIURIL) 25 MG tablet Take 1 tablet (25 mg) by mouth daily 90 tablet 1     levothyroxine (SYNTHROID/LEVOTHROID) 75 MCG tablet Take 1 tablet (75  "mcg) by mouth daily 90 tablet 3     lisinopril (ZESTRIL) 10 MG tablet Take 1 tablet (10 mg) by mouth daily 90 tablet 1     metoprolol succinate ER (TOPROL XL) 50 MG 24 hr tablet TAKE 1 TABLET DAILY 90 tablet 1     RESTASIS 0.05 % ophthalmic emulsion        rosuvastatin (CRESTOR) 10 MG tablet TAKE 1 TABLET DAILY 90 tablet 3     timolol (TIMOPTIC-XE) 0.5 % ophthalmic gel-form        No facility-administered encounter medications on file as of 9/14/2022.             O:   NAD, WDWN, Alert & Oriented, Mood & Affect wnl, Vitals stable   Here today alone   Ht 1.619 m (5' 3.75\")   Wt 81.6 kg (180 lb)   BMI 31.14 kg/m     General appearance normal   Vitals stable   Alert, oriented and in no acute distress      Brown stuck on papules, brown macules on face   Stuck on papules and brown macules on trunk and ext   Red papules on trunk  Skin colored pedunculated papule on right groin  Brown papules and macules with regular pigment network and borders on torso and extremities      The remainder of skin exam is normal       Eyes: Conjunctivae/lids:Normal     ENT: Lips: normal    MSK:Normal    Cardiovascular: peripheral edema none    Pulm: Breathing Normal    Neuro/Psych: Orientation:Alert and Orientedx3 ; Mood/Affect:normal   A/P:  1. Inflamed seborrheic keratosis on right and left temples, shoulders, thighs, mid back x 14  LN2:  Treated with LN2 for 5s for 1-2 cycles. Warned risks of blistering, pain, pigment change, scarring, and incomplete resolution.  Advised patient to return if lesions do not completely resolve.  Wound care sheet given.  2. Inflamed skin tag on right groin x 1  After consent, anesthesia with LEC and prep, tangential excision performed No complications and routine wound care.  3. Seborrheic keratosis, lentigo, angioma, benign nevi   It was a pleasure speaking to Molly Ospina today.  BENIGN LESIONS DISCUSSED WITH PATIENT:  I discussed the specifics of tumor, prognosis, and genetics of benign lesions.  I " explained that treatment of these lesions would be purely cosmetic and not medically neccessary.  I discussed with patient different removal options including excision, cautery and /or laser.      Nature and genetics of benign skin lesions dicussed with patient.  Signs and Symptoms of skin cancer discussed with patient.  ABCDEs of melanoma reviewed with patient.  Patient encouraged to perform monthly skin exams.  UV precautions reviewed with patient.  Risks of non-melanoma skin cancer discussed with patient   Return to clinic in one year or sooner if needed.

## 2022-09-14 NOTE — NURSING NOTE
"Chief Complaint   Patient presents with     Skin Check       Vitals:    09/14/22 1035   Weight: 81.6 kg (180 lb)   Height: 1.619 m (5' 3.75\")     Wt Readings from Last 1 Encounters:   09/14/22 81.6 kg (180 lb)     Ht Readings from Last 1 Encounters:   09/14/22 1.619 m (5' 3.75\")       Cristal Bruno [facility], 9/14/2022 10:35 AM    "

## 2022-09-14 NOTE — LETTER
9/14/2022         RE: Molly Ospina  [address] [place] [address]        Dear Colleague,    Thank you for referring your patient, Molly Ospina, to the [facility]. Please see a copy of my visit note below.    Molly Ospina is an extremely pleasant 71 year old year old female patient here today for skin check. She notes brown scaly areas on body and face that are catching/itching. She denies any painful or bleeding skin lesions.  Patient has no other skin complaints today.  Remainder of the HPI, Meds, PMH, Allergies, FH, and SH was reviewed in chart.    Pertinent Hx:  No personal history of skin cancer   Past Medical History:   Diagnosis Date     Actinic keratosis      AK (actinic keratosis) 10/10/2012     Anxiety state, unspecified      Depressive disorder      High triglycerides      HTN      Mild major depression (H)      PFO (patent foramen ovale)      Unspecified hypothyroidism        Past Surgical History:   Procedure Laterality Date     APPENDECTOMY  01/1992    With hyst     BACK SURGERY  05/19/2020     COLONOSCOPY  9/24/2020     COSMETIC SURGERY  9/20/21    Liposuction     HYSTERECTOMY, SEGUNDO       LASIK       [place] SPINE/LUMBAR DISK SURGERY  05/2020     TONSILLECTOMY & ADENOIDECTOMY          Family History   Problem Relation Age of Onset     Breast Cancer Mother         Age 37 radical mastectomy     Heart Disease Mother      Lipids Mother      Osteoporosis Mother         Scoliosis     Hypertension Mother      C.A.D. Mother      Cancer Mother         melanoma on the leg     Hyperlipidemia Mother      Heart Disease Father      Lipids Father      Thyroid Disease Father      Hypertension Father      C.A.D. Father      Cardiovascular Father      Cancer Father         arm skin cancer     Respiratory Father         pulmonary fibrosis - on O2     Hyperlipidemia Father      Breast Cancer Maternal Grandmother         Lumpectomy approx age 70     Alzheimer Disease  Maternal Grandmother      Diabetes Maternal Grandmother      Heart Disease Maternal Grandfather      Cancer - colorectal Paternal Grandmother      Heart Disease Paternal Grandfather      Lipids Son      Colon Cancer Son      Lipids Son      Diabetes Brother      Diabetes Brother         Type 1     Colon Cancer Son         Kbphaleleno age46       Social History     Socioeconomic History     Marital status:      Spouse name: Not on file     Number of children: Not on file     Years of education: Not on file     Highest education level: Not on file   Occupational History     Employer: RETIRED   Tobacco Use     Smoking status: Former Smoker     Packs/day: 0.00     Years: 0.00     Pack years: 0.00     Types: Cigarettes     Start date: 1965     Quit date: 1986     Years since quittin.8     Smokeless tobacco: Former User   Vaping Use     Vaping Use: Never used   Substance and Sexual Activity     Alcohol use: Yes     Comment: Occasional     Drug use: No     Sexual activity: Not Currently     Partners: Male     Birth control/protection: Post-menopausal, None     Comment:  medical issues   Other Topics Concern     Parent/sibling w/ CABG, MI or angioplasty before 65F 55M? Yes     Comment: Father age 51      Service No     Blood Transfusions No     Caffeine Concern No     Occupational Exposure No     Hobby Hazards No     Sleep Concern No     Stress Concern No     Weight Concern No     Special Diet No     Back Care No     Exercise No     Bike Helmet No     Seat Belt No     Self-Exams No   Social History Narrative     Not on file     Social Determinants of Health     Financial Resource Strain: Not on file   Food Insecurity: Not on file   Transportation Needs: Not on file   Physical Activity: Not on file   Stress: Not on file   Social Connections: Not on file   Intimate Partner Violence: Not on file   Housing Stability: Not on file       Outpatient Encounter Medications as of 2022  "  Medication Sig Dispense Refill     citalopram (CELEXA) 20 MG tablet TAKE 1 TABLET DAILY 90 tablet 1     hydrochlorothiazide (HYDRODIURIL) 25 MG tablet Take 1 tablet (25 mg) by mouth daily 90 tablet 1     levothyroxine (SYNTHROID/LEVOTHROID) 75 MCG tablet Take 1 tablet (75 mcg) by mouth daily 90 tablet 3     lisinopril (ZESTRIL) 10 MG tablet Take 1 tablet (10 mg) by mouth daily 90 tablet 1     metoprolol succinate ER (TOPROL XL) 50 MG 24 hr tablet TAKE 1 TABLET DAILY 90 tablet 1     RESTASIS 0.05 % ophthalmic emulsion        rosuvastatin (CRESTOR) 10 MG tablet TAKE 1 TABLET DAILY 90 tablet 3     timolol (TIMOPTIC-XE) 0.5 % ophthalmic gel-form        No facility-administered encounter medications on file as of 9/14/2022.             O:   NAD, WDWN, Alert & Oriented, Mood & Affect wnl, Vitals stable   Here today alone   Ht 1.619 m (5' 3.75\")   Wt 81.6 kg (180 lb)   BMI 31.14 kg/m     General appearance normal   Vitals stable   Alert, oriented and in no acute distress      Brown stuck on papules, brown macules on face   Stuck on papules and brown macules on trunk and ext   Red papules on trunk  Skin colored pedunculated papule on right groin  Brown papules and macules with regular pigment network and borders on torso and extremities      The remainder of skin exam is normal       Eyes: Conjunctivae/lids:Normal     ENT: Lips: normal    MSK:Normal    Cardiovascular: peripheral edema none    Pulm: Breathing Normal    Neuro/Psych: Orientation:Alert and Orientedx3 ; Mood/Affect:normal   A/P:  1. Inflamed seborrheic keratosis on right and left temples, shoulders, thighs, mid back x 14  LN2:  Treated with LN2 for 5s for 1-2 cycles. Warned risks of blistering, pain, pigment change, scarring, and incomplete resolution.  Advised patient to return if lesions do not completely resolve.  Wound care sheet given.  2. Inflamed skin tag on right groin x 1  After consent, anesthesia with LEC and prep, tangential excision performed No " complications and routine wound care.  3. Seborrheic keratosis, lentigo, angioma, benign nevi   It was a pleasure speaking to Molly Ospina today.  BENIGN LESIONS DISCUSSED WITH PATIENT:  I discussed the specifics of tumor, prognosis, and genetics of benign lesions.  I explained that treatment of these lesions would be purely cosmetic and not medically neccessary.  I discussed with patient different removal options including excision, cautery and /or laser.      Nature and genetics of benign skin lesions dicussed with patient.  Signs and Symptoms of skin cancer discussed with patient.  ABCDEs of melanoma reviewed with patient.  Patient encouraged to perform monthly skin exams.  UV precautions reviewed with patient.  Risks of non-melanoma skin cancer discussed with patient   Return to clinic in one year or sooner if needed.         Again, thank you for allowing me to participate in the care of your patient.        Sincerely,        Yessenia Albrecht PA-C

## 2022-10-26 ASSESSMENT — ENCOUNTER SYMPTOMS
DIARRHEA: 0
SHORTNESS OF BREATH: 0
SORE THROAT: 0
COUGH: 0
JOINT SWELLING: 0
HEMATURIA: 0
HEARTBURN: 0
ABDOMINAL PAIN: 0
WEAKNESS: 0
PALPITATIONS: 0
DIZZINESS: 0
DYSURIA: 0
ARTHRALGIAS: 1
NERVOUS/ANXIOUS: 1
HEMATOCHEZIA: 0
CHILLS: 0
HEADACHES: 0
FREQUENCY: 0
MYALGIAS: 1
CONSTIPATION: 0
FEVER: 0
BREAST MASS: 0
NAUSEA: 0
EYE PAIN: 0
PARESTHESIAS: 0

## 2022-10-26 ASSESSMENT — ACTIVITIES OF DAILY LIVING (ADL): CURRENT_FUNCTION: NO ASSISTANCE NEEDED

## 2022-10-26 NOTE — PROGRESS NOTES
"SUBJECTIVE:   Molly is a 71 year old who presents for Preventive Visit.    Patient has been advised of split billing requirements and indicates understanding: Yes  Are you in the first 12 months of your Medicare coverage?  No    Healthy Habits:     In general, how would you rate your overall health?  Good    Frequency of exercise:  1 day/week    Duration of exercise:  15-30 minutes    Do you usually eat at least 4 servings of fruit and vegetables a day, include whole grains    & fiber and avoid regularly eating high fat or \"junk\" foods?  No    Taking medications regularly:  Yes    Medication side effects:  None    Ability to successfully perform activities of daily living:  No assistance needed    Home Safety:  Lighting is poor    Hearing Impairment:  Difficulty understanding soft or whispered speech    In the past 6 months, have you been bothered by leaking of urine? Yes    In general, how would you rate your overall mental or emotional health?  Good      PHQ-2 Total Score: 0    Additional concerns today:  Yes    Do you feel safe in your environment? Yes    Have you ever done Advance Care Planning? (For example, a Health Directive, POLST, or a discussion with a medical provider or your loved ones about your wishes): Yes, advance care planning is on file.       Fall risk  Fallen 2 or more times in the past year?: No  Any fall with injury in the past year?: No    Cognitive Screening   1) Repeat 3 items (Leader, Season, Table)    2) Clock draw: NORMAL  3) 3 item recall: Recalls 3 objects  Results: 3 items recalled: COGNITIVE IMPAIRMENT LESS LIKELY    Mini-CogTM Copyright DONTE Caraballo. Licensed by the author for use in [facility]; reprinted with permission (tommie@.[email or web address]). All rights reserved.          Reviewed and updated as needed this visit by clinical staff   Tobacco  Allergies  Meds  Problems  Med Hx  Surg Hx  Fam Hx  Soc   Hx          Reviewed and updated as needed this visit by Provider   Tobacco  " Allergies  Meds  Problems  Med Hx  Surg Hx  Fam Hx         Social History     Tobacco Use     Smoking status: Former     Packs/day: 0.00     Years: 0.00     Pack years: 0.00     Types: Cigarettes     Start date: 1965     Quit date: 1986     Years since quittin.0     Smokeless tobacco: Former   Substance Use Topics     Alcohol use: Yes     Comment: Occasional         Alcohol Use 10/26/2022   Prescreen: >3 drinks/day or >7 drinks/week? No   Prescreen: >3 drinks/day or >7 drinks/week? -         Hyperlipidemia Follow-Up      Are you regularly taking any medication or supplement to lower your cholesterol?   Yes- crestor    Are you having muscle aches or other side effects that you think could be caused by your cholesterol lowering medication?  No    Hypertension Follow-up      Do you check your blood pressure regularly outside of the clinic? Yes     Are you following a low salt diet? Yes    Are your blood pressures ever more than 140 on the top number (systolic) OR more   than 90 on the bottom number (diastolic), for example 140/90? No    Hypothyroidism Follow-up      Since last visit, patient describes the following symptoms: Weight stable, no hair loss, no skin changes, no constipation, no loose stools    Right hip was painful for couple days after a derm procedure skin tag removal. Had to be in a frog leg position. Then was in a corn pit and had troubles getting out due to pain. Now pain in gone. History of back pain and surgery in the past. No current pain.     Current providers sharing in care for this patient include:   Patient Care Team:  Gil Dumas PA-C as PCP - General (Family Practice)  Gil Dumas PA-C as Assigned PCP    The following health maintenance items are reviewed in Epic and correct as of today:    Health Maintenance   Topic Date Due     ANNUAL REVIEW OF HM ORDERS  Never done     HEPATITIS B IMMUNIZATION (1 of 3 - 3-dose series) Never done     COVID-19  Vaccine (3 - Booster for Pfizer series) 07/02/2021     INFLUENZA VACCINE (1) 09/01/2022     BMP  11/24/2022     PHQ-9  11/24/2022     LIPID  05/24/2023     TSH W/FREE T4 REFLEX  05/24/2023     MEDICARE ANNUAL WELLNESS VISIT  10/27/2023     FALL RISK ASSESSMENT  10/27/2023     MAMMO SCREENING  04/14/2024     ADVANCE CARE PLANNING  10/27/2027     COLORECTAL CANCER SCREENING  10/06/2030     DTAP/TDAP/TD IMMUNIZATION (4 - Td or Tdap) 10/26/2031     DEXA  10/26/2033     HEPATITIS C SCREENING  Completed     DEPRESSION ACTION PLAN  Completed     Pneumococcal Vaccine: 65+ Years  Completed     ZOSTER IMMUNIZATION  Completed     IPV IMMUNIZATION  Aged Out     MENINGITIS IMMUNIZATION  Aged Out     Lab work is in process  Labs reviewed in EPIC  BP Readings from Last 3 Encounters:   10/27/22 (!) 144/74   05/24/22 135/78   10/26/21 111/69    Wt Readings from Last 3 Encounters:   10/27/22 82.6 kg (182 lb)   09/14/22 81.6 kg (180 lb)   05/24/22 82.1 kg (181 lb)                  Patient Active Problem List   Diagnosis     High triglycerides     Anxiety state     CARDIOVASCULAR SCREENING; LDL GOAL LESS THAN 130     Hyperhidrosis of face     Advanced directives, counseling/discussion     BMI 33.0-33.9,adult     AK (actinic keratosis)     Hypothyroidism, unspecified type     Benign essential hypertension     High blood cholesterol     Major depressive disorder, recurrent episode, mild (H)     Seborrheic keratoses     Idiopathic anaphylactic reaction, subsequent encounter     Seasonal allergic rhinitis due to pollen     Vasomotor rhinitis     Other conjunctivitis of both eyes     Chronic bilateral low back pain with left-sided sciatica     Hyperglycemia     Past Surgical History:   Procedure Laterality Date     APPENDECTOMY  01/1992    With hyst     BACK SURGERY  05/19/2020     COLONOSCOPY  9/24/2020     COSMETIC SURGERY  9/20/21    Liposuction     HYSTERECTOMY, SEGUNDO       LASIK       UT SPINE/LUMBAR DISK SURGERY  05/2020      TONSILLECTOMY & ADENOIDECTOMY         Social History     Tobacco Use     Smoking status: Former     Packs/day: 0.00     Years: 0.00     Pack years: 0.00     Types: Cigarettes     Start date: 1965     Quit date: 1986     Years since quittin.0     Smokeless tobacco: Former   Substance Use Topics     Alcohol use: Yes     Comment: Occasional     Family History   Problem Relation Age of Onset     Breast Cancer Mother         Age 37 radical mastectomy     Heart Disease Mother      Lipids Mother      Osteoporosis Mother         Scoliosis     Hypertension Mother      C.A.D. Mother      Cancer Mother         melanoma on the leg     Hyperlipidemia Mother      Heart Disease Father      Lipids Father      Thyroid Disease Father      Hypertension Father      C.A.D. Father      Cardiovascular Father      Cancer Father         arm skin cancer     Respiratory Father         pulmonary fibrosis - on O2     Hyperlipidemia Father      Breast Cancer Maternal Grandmother         Lumpectomy approx age 70     Alzheimer Disease Maternal Grandmother      Diabetes Maternal Grandmother      Heart Disease Maternal Grandfather      Cancer - colorectal Paternal Grandmother      Heart Disease Paternal Grandfather      Lipids Son      Colon Cancer Son      Lipids Son      Diabetes Brother      Diabetes Brother         Type 1     Colon Cancer Son         [facility] age46         Current Outpatient Medications   Medication Sig Dispense Refill     citalopram (CELEXA) 20 MG tablet Take 1 tablet (20 mg) by mouth daily 90 tablet 1     hydrochlorothiazide (HYDRODIURIL) 25 MG tablet Take 1 tablet (25 mg) by mouth daily 90 tablet 1     levothyroxine (SYNTHROID/LEVOTHROID) 75 MCG tablet Take 1 tablet (75 mcg) by mouth daily 90 tablet 1     lisinopril (ZESTRIL) 20 MG tablet Take 1 tablet (20 mg) by mouth daily 90 tablet 1     metoprolol succinate ER (TOPROL XL) 50 MG 24 hr tablet Take 1 tablet (50 mg) by mouth daily 90 tablet 1     RESTASIS 0.05  "% ophthalmic emulsion        rosuvastatin (CRESTOR) 10 MG tablet Take 1 tablet (10 mg) by mouth daily 90 tablet 1     timolol (TIMOPTIC-XE) 0.5 % ophthalmic gel-form        No Known Allergies      Review of Systems   Constitutional: Negative for chills and fever.   HENT: Negative for congestion, ear pain, hearing loss and sore throat.    Eyes: Negative for pain and visual disturbance.   Respiratory: Negative for cough and shortness of breath.    Cardiovascular: Negative for chest pain, palpitations and peripheral edema.   Gastrointestinal: Negative for abdominal pain, constipation, diarrhea, heartburn, hematochezia and nausea.   Breasts:  Negative for tenderness, breast mass and discharge.   Genitourinary: Negative for dysuria, frequency, genital sores, hematuria, pelvic pain, urgency, vaginal bleeding and vaginal discharge.   Musculoskeletal: Positive for arthralgias and myalgias. Negative for joint swelling.   Skin: Negative for rash.   Neurological: Negative for dizziness, weakness, headaches and paresthesias.   Psychiatric/Behavioral: Negative for mood changes. The patient is nervous/anxious.        OBJECTIVE:   BP (!) 144/74   Pulse 72   Temp (!) 96  F (35.6  C) (Tympanic)   Resp 14   Ht 1.619 m (5' 3.75\")   Wt 82.6 kg (182 lb)   SpO2 96%   BMI 31.49 kg/m   Estimated body mass index is 31.49 kg/m  as calculated from the following:    Height as of this encounter: 1.619 m (5' 3.75\").    Weight as of this encounter: 82.6 kg (182 lb).  Physical Exam  GENERAL: healthy, alert and no distress  EYES: Eyes grossly normal to inspection, PERRL and conjunctivae and sclerae normal  HENT: ear canals and TM's normal, nose and mouth without ulcers or lesions  NECK: no adenopathy, no asymmetry, masses, or scars and thyroid normal to palpation  RESP: lungs clear to auscultation - no rales, rhonchi or wheezes  CV: regular rate and rhythm, normal S1 S2, no S3 or S4, no murmur, click or rub, no peripheral edema and " peripheral pulses strong  ABDOMEN: soft, nontender, no hepatosplenomegaly, no masses and bowel sounds normal  MS: no gross musculoskeletal defects noted, no edema  SKIN: no suspicious lesions or rashes  NEURO: Normal strength and tone, mentation intact and speech normal  PSYCH: mentation appears normal, affect normal/bright  Right hip with full range of motion. Mild tenderness over right greater trochanter. Neg SLR. Calves soft non-tender. Neurovascularly Intact Distally.      Diagnostic Test Results:  Labs reviewed in Epic    ASSESSMENT / PLAN:       ICD-10-CM    1. Medicare annual wellness visit, subsequent  Z00.00       2. Benign essential hypertension  I10 hydrochlorothiazide (HYDRODIURIL) 25 MG tablet     metoprolol succinate ER (TOPROL XL) 50 MG 24 hr tablet     Comprehensive metabolic panel (BMP + Alb, Alk Phos, ALT, AST, Total. Bili, TP)     OFFICE/OUTPT VISIT,EST,LEVL IV     lisinopril (ZESTRIL) 20 MG tablet     Comprehensive metabolic panel (BMP + Alb, Alk Phos, ALT, AST, Total. Bili, TP)      3. Major depressive disorder, recurrent episode, mild (H)  F33.0 citalopram (CELEXA) 20 MG tablet     OFFICE/OUTPT VISIT,EST,LEVL IV      4. Hypothyroidism, unspecified type  E03.9 levothyroxine (SYNTHROID/LEVOTHROID) 75 MCG tablet     TSH with free T4 reflex     OFFICE/OUTPT VISIT,EST,LEVL IV     TSH with free T4 reflex      5. High triglycerides  E78.1 rosuvastatin (CRESTOR) 10 MG tablet     Lipid panel reflex to direct LDL Fasting     Comprehensive metabolic panel (BMP + Alb, Alk Phos, ALT, AST, Total. Bili, TP)     OFFICE/OUTPT VISIT,EST,LEVL IV     Lipid panel reflex to direct LDL Fasting     Comprehensive metabolic panel (BMP + Alb, Alk Phos, ALT, AST, Total. Bili, TP)      6. Hyperglycemia  R73.9 Hemoglobin A1c     OFFICE/OUTPT VISIT,EST,LEVL IV     Hemoglobin A1c     Glucose, whole blood     CANCELED: Hemoglobin A1c      1. Work on Healthy diet and exercise. Getting heart rate elevated for 30 mins most days  "of week.  2. Will increase lisinopril to 20mg daily. Recheck with ancillary in 2 wks. warning signs discussed. side effects discussed  3.-5. medical conditions are stable. meds refilled.  6. Labs pending. Follow up  Dependant on labs.     Briefly discussed her hip pain at this point is better so we will monitor this conservatively she will follow-up in the future because her problems    COUNSELING:  Reviewed preventive health counseling, as reflected in patient instructions       Regular exercise       Healthy diet/nutrition       Vision screening    Estimated body mass index is 31.49 kg/m  as calculated from the following:    Height as of this encounter: 1.619 m (5' 3.75\").    Weight as of this encounter: 82.6 kg (182 lb).    Weight management plan: Discussed healthy diet and exercise guidelines    She reports that she quit smoking about 36 years ago. Her smoking use included cigarettes. She started smoking about 57 years ago. She has quit using smokeless tobacco.      Appropriate preventive services were discussed with this patient, including applicable screening as appropriate for cardiovascular disease, diabetes, osteopenia/osteoporosis, and glaucoma.  As appropriate for age/gender, discussed screening for colorectal cancer, prostate cancer, breast cancer, and cervical cancer. Checklist reviewing preventive services available has been given to the patient.    Reviewed patients plan of care and provided an AVS. The Basic Care Plan (routine screening as documented in Health Maintenance) for Molly meets the Care Plan requirement. This Care Plan has been established and reviewed with the Patient.    Counseling Resources:  ATP IV Guidelines  Pooled Cohorts Equation Calculator  Breast Cancer Risk Calculator  Breast Cancer: Medication to Reduce Risk  FRAX Risk Assessment  ICSI Preventive Guidelines  Dietary Guidelines for Americans, 2010  USDA's MyPlate  ASA Prophylaxis  Lung CA Screening    Gil Dumas, " [facility]    Identified Health Risks:

## 2022-10-27 ENCOUNTER — OFFICE VISIT (OUTPATIENT)
Dept: FAMILY MEDICINE | Facility: CLINIC | Age: 72
End: 2022-10-27
Payer: MEDICARE

## 2022-10-27 VITALS
HEART RATE: 72 BPM | HEIGHT: 64 IN | SYSTOLIC BLOOD PRESSURE: 144 MMHG | TEMPERATURE: 96 F | WEIGHT: 182 LBS | BODY MASS INDEX: 31.07 KG/M2 | DIASTOLIC BLOOD PRESSURE: 74 MMHG | RESPIRATION RATE: 14 BRPM | OXYGEN SATURATION: 96 %

## 2022-10-27 DIAGNOSIS — R73.9 HYPERGLYCEMIA: ICD-10-CM

## 2022-10-27 DIAGNOSIS — F33.0 MAJOR DEPRESSIVE DISORDER, RECURRENT EPISODE, MILD (H): ICD-10-CM

## 2022-10-27 DIAGNOSIS — E78.1 HIGH TRIGLYCERIDES: ICD-10-CM

## 2022-10-27 DIAGNOSIS — Z00.00 MEDICARE ANNUAL WELLNESS VISIT, SUBSEQUENT: Primary | ICD-10-CM

## 2022-10-27 DIAGNOSIS — E03.9 HYPOTHYROIDISM, UNSPECIFIED TYPE: ICD-10-CM

## 2022-10-27 DIAGNOSIS — I10 BENIGN ESSENTIAL HYPERTENSION: ICD-10-CM

## 2022-10-27 LAB
ALBUMIN SERPL-MCNC: 4.2 G/DL (ref 3.4–5)
ALP SERPL-CCNC: 65 U/L (ref 40–150)
ALT SERPL W P-5'-P-CCNC: 35 U/L (ref 0–50)
ANION GAP SERPL CALCULATED.3IONS-SCNC: 6 MMOL/L (ref 3–14)
AST SERPL W P-5'-P-CCNC: 27 U/L (ref 0–45)
BILIRUB SERPL-MCNC: 0.8 MG/DL (ref 0.2–1.3)
BUN SERPL-MCNC: 18 MG/DL (ref 7–30)
CALCIUM SERPL-MCNC: 9.2 MG/DL (ref 8.5–10.1)
CHLORIDE BLD-SCNC: 102 MMOL/L (ref 94–109)
CHOLEST SERPL-MCNC: 156 MG/DL
CO2 SERPL-SCNC: 28 MMOL/L (ref 20–32)
CREAT SERPL-MCNC: 0.62 MG/DL (ref 0.52–1.04)
FASTING STATUS PATIENT QL REPORTED: YES
GFR SERPL CREATININE-BSD FRML MDRD: >90 ML/MIN/1.73M2
GLUCOSE BLD-MCNC: 102 MG/DL (ref 60–99)
GLUCOSE BLD-MCNC: 108 MG/DL (ref 70–99)
HBA1C MFR BLD: 5.1 % (ref 0–5.6)
HDLC SERPL-MCNC: 42 MG/DL
LDLC SERPL CALC-MCNC: 59 MG/DL
NONHDLC SERPL-MCNC: 114 MG/DL
POTASSIUM BLD-SCNC: 4.6 MMOL/L (ref 3.4–5.3)
PROT SERPL-MCNC: 7 G/DL (ref 6.8–8.8)
SODIUM SERPL-SCNC: 136 MMOL/L (ref 133–144)
TRIGL SERPL-MCNC: 277 MG/DL
TSH SERPL DL<=0.005 MIU/L-ACNC: 3.04 MU/L (ref 0.4–4)

## 2022-10-27 PROCEDURE — 80061 LIPID PANEL: CPT | Performed by: PHYSICIAN ASSISTANT

## 2022-10-27 PROCEDURE — 99213 OFFICE O/P EST LOW 20 MIN: CPT | Mod: 25 | Performed by: PHYSICIAN ASSISTANT

## 2022-10-27 PROCEDURE — 80053 COMPREHEN METABOLIC PANEL: CPT | Performed by: PHYSICIAN ASSISTANT

## 2022-10-27 PROCEDURE — 36415 COLL VENOUS BLD VENIPUNCTURE: CPT | Performed by: PHYSICIAN ASSISTANT

## 2022-10-27 PROCEDURE — G0439 PPPS, SUBSEQ VISIT: HCPCS | Performed by: PHYSICIAN ASSISTANT

## 2022-10-27 PROCEDURE — 84443 ASSAY THYROID STIM HORMONE: CPT | Performed by: PHYSICIAN ASSISTANT

## 2022-10-27 PROCEDURE — 83036 HEMOGLOBIN GLYCOSYLATED A1C: CPT | Performed by: PHYSICIAN ASSISTANT

## 2022-10-27 PROCEDURE — 82947 ASSAY GLUCOSE BLOOD QUANT: CPT | Mod: 59 | Performed by: PHYSICIAN ASSISTANT

## 2022-10-27 RX ORDER — LISINOPRIL 20 MG/1
20 TABLET ORAL DAILY
Qty: 90 TABLET | Refills: 1 | Status: SHIPPED | OUTPATIENT
Start: 2022-10-27 | End: 2023-01-13

## 2022-10-27 RX ORDER — LEVOTHYROXINE SODIUM 75 UG/1
75 TABLET ORAL DAILY
Qty: 90 TABLET | Refills: 1 | Status: SHIPPED | OUTPATIENT
Start: 2022-10-27 | End: 2022-12-28

## 2022-10-27 RX ORDER — HYDROCHLOROTHIAZIDE 25 MG/1
25 TABLET ORAL DAILY
Qty: 90 TABLET | Refills: 1 | Status: SHIPPED | OUTPATIENT
Start: 2022-10-27 | End: 2023-03-15

## 2022-10-27 RX ORDER — METOPROLOL SUCCINATE 50 MG/1
50 TABLET, EXTENDED RELEASE ORAL DAILY
Qty: 90 TABLET | Refills: 1 | Status: SHIPPED | OUTPATIENT
Start: 2022-10-27 | End: 2022-12-28

## 2022-10-27 RX ORDER — LISINOPRIL 10 MG/1
10 TABLET ORAL DAILY
Qty: 90 TABLET | Refills: 1 | Status: CANCELLED | OUTPATIENT
Start: 2022-10-27

## 2022-10-27 RX ORDER — CITALOPRAM HYDROBROMIDE 20 MG/1
20 TABLET ORAL DAILY
Qty: 90 TABLET | Refills: 1 | Status: SHIPPED | OUTPATIENT
Start: 2022-10-27 | End: 2022-12-28

## 2022-10-27 RX ORDER — ROSUVASTATIN CALCIUM 10 MG/1
10 TABLET, COATED ORAL DAILY
Qty: 90 TABLET | Refills: 1 | Status: SHIPPED | OUTPATIENT
Start: 2022-10-27 | End: 2023-09-11

## 2022-10-27 ASSESSMENT — PAIN SCALES - GENERAL: PAINLEVEL: WORST PAIN (10)

## 2022-11-08 ENCOUNTER — DOCUMENTATION ONLY (OUTPATIENT)
Dept: OTHER | Facility: CLINIC | Age: 72
End: 2022-11-08

## 2022-11-08 PROBLEM — Z71.89 ADVANCED DIRECTIVES, COUNSELING/DISCUSSION: Status: RESOLVED | Noted: 2018-07-19 | Resolved: 2022-11-08

## 2022-11-10 ENCOUNTER — ALLIED HEALTH/NURSE VISIT (OUTPATIENT)
Dept: FAMILY MEDICINE | Facility: CLINIC | Age: 72
End: 2022-11-10
Payer: MEDICARE

## 2022-11-10 VITALS — DIASTOLIC BLOOD PRESSURE: 62 MMHG | SYSTOLIC BLOOD PRESSURE: 128 MMHG

## 2022-11-10 DIAGNOSIS — I10 BENIGN ESSENTIAL HYPERTENSION: Primary | ICD-10-CM

## 2022-11-10 PROCEDURE — 99207 PR NO CHARGE NURSE ONLY: CPT

## 2022-11-10 NOTE — PROGRESS NOTES
I met with Molly Ospina at the request of Oppel to recheck her blood pressure.  Blood pressure medications on the med list were reviewed with patient.    Patient has taken all medications as per usual regimen: Yes  Patient reports tolerating them without any issues or concerns: Yes    Vitals:    11/10/22 1110   BP: 128/62       Blood pressure was taken, previous encounter was reviewed, recorded blood pressure below 140/90.  Patient was discharged and the note will be sent to the provider for final review.

## 2023-01-13 DIAGNOSIS — I10 BENIGN ESSENTIAL HYPERTENSION: ICD-10-CM

## 2023-01-13 RX ORDER — LISINOPRIL 20 MG/1
20 TABLET ORAL DAILY
Qty: 90 TABLET | Refills: 1 | Status: SHIPPED | OUTPATIENT
Start: 2023-01-13 | End: 2023-07-13

## 2023-03-14 DIAGNOSIS — I10 BENIGN ESSENTIAL HYPERTENSION: ICD-10-CM

## 2023-03-15 RX ORDER — HYDROCHLOROTHIAZIDE 25 MG/1
TABLET ORAL
Qty: 90 TABLET | Refills: 3 | Status: SHIPPED | OUTPATIENT
Start: 2023-03-15 | End: 2023-11-08

## 2023-04-23 ENCOUNTER — HEALTH MAINTENANCE LETTER (OUTPATIENT)
Age: 73
End: 2023-04-23

## 2023-04-23 DIAGNOSIS — F33.0 MAJOR DEPRESSIVE DISORDER, RECURRENT EPISODE, MILD (H): ICD-10-CM

## 2023-04-25 RX ORDER — CITALOPRAM HYDROBROMIDE 20 MG/1
TABLET ORAL
Qty: 90 TABLET | Refills: 3 | Status: SHIPPED | OUTPATIENT
Start: 2023-04-25 | End: 2023-11-08

## 2023-05-01 ASSESSMENT — ENCOUNTER SYMPTOMS: BREAST MASS: 0

## 2023-05-01 ASSESSMENT — ACTIVITIES OF DAILY LIVING (ADL): CURRENT_FUNCTION: NO ASSISTANCE NEEDED

## 2023-05-02 ENCOUNTER — OFFICE VISIT (OUTPATIENT)
Dept: FAMILY MEDICINE | Facility: CLINIC | Age: 73
End: 2023-05-02
Payer: MEDICARE

## 2023-05-02 VITALS
DIASTOLIC BLOOD PRESSURE: 73 MMHG | RESPIRATION RATE: 16 BRPM | TEMPERATURE: 96.5 F | WEIGHT: 183 LBS | OXYGEN SATURATION: 97 % | BODY MASS INDEX: 31.24 KG/M2 | SYSTOLIC BLOOD PRESSURE: 127 MMHG | HEART RATE: 75 BPM | HEIGHT: 64 IN

## 2023-05-02 DIAGNOSIS — E03.9 HYPOTHYROIDISM, UNSPECIFIED TYPE: ICD-10-CM

## 2023-05-02 DIAGNOSIS — E78.1 HIGH TRIGLYCERIDES: ICD-10-CM

## 2023-05-02 DIAGNOSIS — G56.01 CARPAL TUNNEL SYNDROME OF RIGHT WRIST: ICD-10-CM

## 2023-05-02 DIAGNOSIS — I10 BENIGN ESSENTIAL HYPERTENSION: ICD-10-CM

## 2023-05-02 DIAGNOSIS — R25.2 LEG CRAMPS: ICD-10-CM

## 2023-05-02 DIAGNOSIS — Z00.00 MEDICARE ANNUAL WELLNESS VISIT, SUBSEQUENT: Primary | ICD-10-CM

## 2023-05-02 DIAGNOSIS — N39.3 FEMALE STRESS INCONTINENCE: ICD-10-CM

## 2023-05-02 DIAGNOSIS — F33.0 MAJOR DEPRESSIVE DISORDER, RECURRENT EPISODE, MILD (H): ICD-10-CM

## 2023-05-02 LAB
ALBUMIN SERPL-MCNC: 4.5 G/DL (ref 3.4–5)
ALP SERPL-CCNC: 80 U/L (ref 40–150)
ALT SERPL W P-5'-P-CCNC: 31 U/L (ref 0–50)
ANION GAP SERPL CALCULATED.3IONS-SCNC: 7 MMOL/L (ref 3–14)
AST SERPL W P-5'-P-CCNC: 25 U/L (ref 0–45)
BILIRUB SERPL-MCNC: 0.7 MG/DL (ref 0.2–1.3)
BUN SERPL-MCNC: 15 MG/DL (ref 7–30)
CALCIUM SERPL-MCNC: 9.2 MG/DL (ref 8.5–10.1)
CHLORIDE BLD-SCNC: 104 MMOL/L (ref 94–109)
CHOLEST SERPL-MCNC: 142 MG/DL
CO2 SERPL-SCNC: 27 MMOL/L (ref 20–32)
CREAT SERPL-MCNC: 0.64 MG/DL (ref 0.52–1.04)
FASTING STATUS PATIENT QL REPORTED: NO
GFR SERPL CREATININE-BSD FRML MDRD: >90 ML/MIN/1.73M2
GLUCOSE BLD-MCNC: 97 MG/DL (ref 70–99)
HDLC SERPL-MCNC: 44 MG/DL
LDLC SERPL CALC-MCNC: 45 MG/DL
NONHDLC SERPL-MCNC: 98 MG/DL
POTASSIUM BLD-SCNC: 4.4 MMOL/L (ref 3.4–5.3)
PROT SERPL-MCNC: 7.2 G/DL (ref 6.8–8.8)
SODIUM SERPL-SCNC: 138 MMOL/L (ref 133–144)
TRIGL SERPL-MCNC: 264 MG/DL
TSH SERPL DL<=0.005 MIU/L-ACNC: 3.09 MU/L (ref 0.4–4)

## 2023-05-02 PROCEDURE — 80053 COMPREHEN METABOLIC PANEL: CPT | Performed by: PHYSICIAN ASSISTANT

## 2023-05-02 PROCEDURE — 84443 ASSAY THYROID STIM HORMONE: CPT | Performed by: PHYSICIAN ASSISTANT

## 2023-05-02 PROCEDURE — 99214 OFFICE O/P EST MOD 30 MIN: CPT | Mod: 25 | Performed by: PHYSICIAN ASSISTANT

## 2023-05-02 PROCEDURE — 36415 COLL VENOUS BLD VENIPUNCTURE: CPT | Performed by: PHYSICIAN ASSISTANT

## 2023-05-02 PROCEDURE — 80061 LIPID PANEL: CPT | Performed by: PHYSICIAN ASSISTANT

## 2023-05-02 PROCEDURE — G0439 PPPS, SUBSEQ VISIT: HCPCS | Performed by: PHYSICIAN ASSISTANT

## 2023-05-02 RX ORDER — OXYBUTYNIN CHLORIDE 5 MG/1
5 TABLET ORAL 2 TIMES DAILY
Qty: 60 TABLET | Refills: 3 | Status: SHIPPED | OUTPATIENT
Start: 2023-05-02 | End: 2023-05-11

## 2023-05-02 ASSESSMENT — PATIENT HEALTH QUESTIONNAIRE - PHQ9
SUM OF ALL RESPONSES TO PHQ QUESTIONS 1-9: 3
10. IF YOU CHECKED OFF ANY PROBLEMS, HOW DIFFICULT HAVE THESE PROBLEMS MADE IT FOR YOU TO DO YOUR WORK, TAKE CARE OF THINGS AT HOME, OR GET ALONG WITH OTHER PEOPLE: NOT DIFFICULT AT ALL
SUM OF ALL RESPONSES TO PHQ QUESTIONS 1-9: 3

## 2023-05-02 ASSESSMENT — ACTIVITIES OF DAILY LIVING (ADL): CURRENT_FUNCTION: NO ASSISTANCE NEEDED

## 2023-05-02 ASSESSMENT — ENCOUNTER SYMPTOMS: BREAST MASS: 0

## 2023-05-02 ASSESSMENT — PAIN SCALES - GENERAL: PAINLEVEL: NO PAIN (0)

## 2023-05-02 NOTE — PROGRESS NOTES
"SUBJECTIVE:   Molly is a 72 year old who presents for Preventive Visit.      5/2/2023     9:09 AM   Additional Questions   Roomed by Shannan   Accompanied by Self         5/2/2023     9:09 AM   Patient Reported Additional Medications   Patient reports taking the following new medications vit     Patient has been advised of split billing requirements and indicates understanding: Yes  Are you in the first 12 months of your Medicare coverage?  No      Healthy Habits:     In general, how would you rate your overall health?  Excellent    Frequency of exercise:  1 day/week    Duration of exercise:  15-30 minutes    Do you usually eat at least 4 servings of fruit and vegetables a day, include whole grains    & fiber and avoid regularly eating high fat or \"junk\" foods?  No    Taking medications regularly:  Yes    Medication side effects:  None    Ability to successfully perform activities of daily living:  No assistance needed    Home Safety:  No safety concerns identified    Hearing Impairment:  Difficulty following a conversation in a noisy restaurant or crowded room and find that men's voices are easier to understand than woman's    In the past 6 months, have you been bothered by leaking of urine? Yes    In general, how would you rate your overall mental or emotional health?  Excellent      PHQ-2 Total Score: 0    Additional concerns today:  No    Stress urinary  Incontinence that is getting worse.  Increased with laughing sneezing and sometimes even bending over.  She wears pads occasionally.  She states is becoming bothersome for her.    Bilateral leg cramps worse in the morning. Tx; drinking more water.       Couple months of Right hand numbness. Worse at night. Most nights.     Have you ever done Advance Care Planning? (For example, a Health Directive, POLST, or a discussion with a medical provider or your loved ones about your wishes): No, advance care planning information given to patient to review.  Patient declined " advance care planning discussion at this time.     Fall risk  Fallen 2 or more times in the past year?: No  Any fall with injury in the past year?: No    Cognitive Screening   1) Repeat 3 items (Leader, Season, Table)    2) Clock draw: NORMAL  3) 3 item recall: Recalls 3 objects  Results: 3 items recalled: COGNITIVE IMPAIRMENT LESS LIKELY    Mini-CogTM Copyright DONTE Caraballo. Licensed by the author for use in [facility]; reprinted with permission (tommie@[facility]). All rights reserved.        Reviewed and updated as needed this visit by clinical staff   Tobacco  Allergies  Meds  Problems  Med Hx  Surg Hx  Fam Hx          Reviewed and updated as needed this visit by Provider   Tobacco  Allergies  Meds  Problems  Med Hx  Surg Hx  Fam Hx         Social History     Tobacco Use     Smoking status: Former     Packs/day: 0.00     Years: 0.00     Pack years: 0.00     Types: Cigarettes     Start date: 1965     Quit date: 1986     Years since quittin.5     Smokeless tobacco: Former   Vaping Use     Vaping status: Never Used   Substance Use Topics     Alcohol use: Yes     Comment: Occasional             2023     3:05 PM   Alcohol Use   Prescreen: >3 drinks/day or >7 drinks/week? No         Hypertension Follow-up      Do you check your blood pressure regularly outside of the clinic? Yes     Are you following a low salt diet? Yes    Are your blood pressures ever more than 140 on the top number (systolic) OR more   than 90 on the bottom number (diastolic), for example 140/90? No    Depression Followup    How are you doing with your depression since your last visit? Stable, doing well    Are you having other symptoms that might be associated with depression? No    Have you had a significant life event?  No     Are you feeling anxious or having panic attacks?   No    Do you have any concerns with your use of alcohol or other drugs? No    Social History     Tobacco Use     Smoking status: Former      Packs/day: 0.00     Years: 0.00     Pack years: 0.00     Types: Cigarettes     Start date: 1965     Quit date: 1986     Years since quittin.5     Smokeless tobacco: Former   Vaping Use     Vaping status: Never Used   Substance Use Topics     Alcohol use: Yes     Comment: Occasional     Drug use: No         10/26/2021     9:57 AM 2022     8:52 AM 2023     9:08 AM   PHQ   PHQ-9 Total Score 2 2 3   Q9: Thoughts of better off dead/self-harm past 2 weeks Not at all Not at all Not at all         2021     9:19 AM 2021    10:32 AM 10/26/2021     9:57 AM   KRYSTYNA-7 SCORE   Total Score 10 (moderate anxiety) 6 (mild anxiety) 6 (mild anxiety)   Total Score 10 6 6     Hypothyroidism Follow-up      Since last visit, patient describes the following symptoms: Weight stable, no hair loss, no skin changes, no constipation, no loose stools      Current providers sharing in care for this patient include:   Patient Care Team:  Gil Dumas PA-C as PCP - General (Family Practice)  Gil Dumas PA-C as Assigned PCP    The following health maintenance items are reviewed in Epic and correct as of today:  Health Maintenance   Topic Date Due     ANNUAL REVIEW OF HM ORDERS  Never done     COVID-19 Vaccine (3 - Booster for Pfizer series) 2021     INFLUENZA VACCINE (1) 2022     BMP  2023     LIPID  10/27/2023     TSH W/FREE T4 REFLEX  10/27/2023     PHQ-9  2023     MAMMO SCREENING  2024     MEDICARE ANNUAL WELLNESS VISIT  2024     FALL RISK ASSESSMENT  2024     ADVANCE CARE PLANNING  2028     COLORECTAL CANCER SCREENING  10/06/2030     DTAP/TDAP/TD IMMUNIZATION (3 - Td or Tdap) 10/26/2031     DEXA  10/26/2033     HEPATITIS C SCREENING  Completed     DEPRESSION ACTION PLAN  Completed     Pneumococcal Vaccine: 65+ Years  Completed     ZOSTER IMMUNIZATION  Completed     IPV IMMUNIZATION  Aged Out     MENINGITIS IMMUNIZATION  Aged Out     Lab work  is in process  Labs reviewed in EPIC  BP Readings from Last 3 Encounters:   23 127/73   11/10/22 128/62   10/27/22 (!) 144/74    Wt Readings from Last 3 Encounters:   23 83 kg (183 lb)   10/27/22 82.6 kg (182 lb)   22 81.6 kg (180 lb)                  Patient Active Problem List   Diagnosis     High triglycerides     Anxiety state     CARDIOVASCULAR SCREENING; LDL GOAL LESS THAN 130     Hyperhidrosis of face     BMI 33.0-33.9,adult     AK (actinic keratosis)     Hypothyroidism, unspecified type     Benign essential hypertension     High blood cholesterol     Major depressive disorder, recurrent episode, mild (H)     Seborrheic keratoses     Idiopathic anaphylactic reaction, subsequent encounter     Seasonal allergic rhinitis due to pollen     Vasomotor rhinitis     Other conjunctivitis of both eyes     Chronic bilateral low back pain with left-sided sciatica     Hyperglycemia     Female stress incontinence     Past Surgical History:   Procedure Laterality Date     APPENDECTOMY  1992    With hyst     BACK SURGERY  2020     COLONOSCOPY  2020     COSMETIC SURGERY  21    Liposuction     HYSTERECTOMY, SEGUNDO       LASIK       IN SPINE/LUMBAR DISK SURGERY  2020     TONSILLECTOMY & ADENOIDECTOMY         Social History     Tobacco Use     Smoking status: Former     Packs/day: 0.00     Years: 0.00     Pack years: 0.00     Types: Cigarettes     Start date: 1965     Quit date: 1986     Years since quittin.5     Smokeless tobacco: Former   Vaping Use     Vaping status: Never Used   Substance Use Topics     Alcohol use: Yes     Comment: Occasional     Family History   Problem Relation Age of Onset     Breast Cancer Mother         Age 37 radical mastectomy     Heart Disease Mother      Lipids Mother      Osteoporosis Mother         Scoliosis     Hypertension Mother      C.A.D. Mother      Cancer Mother         melanoma on the leg     Hyperlipidemia Mother      Heart Disease  "Father      Lipids Father      Thyroid Disease Father      Hypertension Father      C.A.D. Father      Cardiovascular Father      Cancer Father         arm skin cancer     Respiratory Father         pulmonary fibrosis - on O2     Hyperlipidemia Father      Breast Cancer Maternal Grandmother         Lumpectomy approx age 70     Alzheimer Disease Maternal Grandmother      Diabetes Maternal Grandmother      Heart Disease Maternal Grandfather      Cancer - colorectal Paternal Grandmother      Heart Disease Paternal Grandfather      Lipids Son      Colon Cancer Son      Lipids Son      Diabetes Brother      Diabetes Brother         Type 1     Colon Cancer Son         [place] age44         Current Outpatient Medications   Medication Sig Dispense Refill     citalopram (CELEXA) 20 MG tablet TAKE 1 TABLET DAILY 90 tablet 3     hydrochlorothiazide (HYDRODIURIL) 25 MG tablet TAKE 1 TABLET DAILY 90 tablet 3     levothyroxine (SYNTHROID/LEVOTHROID) 75 MCG tablet TAKE 1 TABLET DAILY 90 tablet 1     lisinopril (ZESTRIL) 20 MG tablet Take 1 tablet (20 mg) by mouth daily 90 tablet 1     metoprolol succinate ER (TOPROL XL) 50 MG 24 hr tablet TAKE 1 TABLET DAILY 90 tablet 1     oxybutynin (DITROPAN) 5 MG tablet Take 1 tablet (5 mg) by mouth 2 times daily 60 tablet 3     RESTASIS 0.05 % ophthalmic emulsion        rosuvastatin (CRESTOR) 10 MG tablet Take 1 tablet (10 mg) by mouth daily 90 tablet 1     timolol (TIMOPTIC-XE) 0.5 % ophthalmic gel-form        No Known Allergies          Review of Systems   Breasts:  Negative for tenderness, breast mass and discharge.   Genitourinary: Negative for pelvic pain, vaginal bleeding and vaginal discharge.     Constitutional, HEENT, cardiovascular, pulmonary, gi and gu systems are negative, except as otherwise noted.    OBJECTIVE:   /73   Pulse 75   Temp (!) 96.5  F (35.8  C) (Tympanic)   Resp 16   Ht 1.619 m (5' 3.75\")   Wt 83 kg (183 lb)   SpO2 97%   BMI 31.66 kg/m   Estimated body " "mass index is 31.66 kg/m  as calculated from the following:    Height as of this encounter: 1.619 m (5' 3.75\").    Weight as of this encounter: 83 kg (183 lb).  Physical Exam  GENERAL: healthy, alert and no distress  EYES: Eyes grossly normal to inspection, PERRL and conjunctivae and sclerae normal  HENT: ear canals and TM's normal, nose and mouth without ulcers or lesions  NECK: no adenopathy, no asymmetry, masses, or scars and thyroid normal to palpation  RESP: lungs clear to auscultation - no rales, rhonchi or wheezes  CV: regular rate and rhythm, normal S1 S2, no S3 or S4, no murmur, click or rub, no peripheral edema and peripheral pulses strong  ABDOMEN: soft, nontender, no hepatosplenomegaly, no masses and bowel sounds normal  MS: no gross musculoskeletal defects noted, no edema  SKIN: no suspicious lesions or rashes  NEURO: Normal strength and tone, mentation intact and speech normal  PSYCH: mentation appears normal, affect normal/bright  Female exam deferred  Right hand with positive Tinel's and failings.  She has full range of motion with 5-5 strength.    Diagnostic Test Results:  Labs reviewed in Epic    ASSESSMENT / PLAN:       ICD-10-CM    1. Medicare annual wellness visit, subsequent  Z00.00       2. Major depressive disorder, recurrent episode, mild (H)  F33.0 OFFICE/OUTPT VISIT,EST,LEVL IV      3. Benign essential hypertension  I10 Comprehensive metabolic panel (BMP + Alb, Alk Phos, ALT, AST, Total. Bili, TP)     Comprehensive metabolic panel (BMP + Alb, Alk Phos, ALT, AST, Total. Bili, TP)     OFFICE/OUTPT VISIT,EST,LEVL IV      4. Hypothyroidism, unspecified type  E03.9 TSH with free T4 reflex     TSH with free T4 reflex     OFFICE/OUTPT VISIT,EST,LEVL IV      5. High triglycerides  E78.1 Lipid panel reflex to direct LDL Fasting     Comprehensive metabolic panel (BMP + Alb, Alk Phos, ALT, AST, Total. Bili, TP)     Lipid panel reflex to direct LDL Fasting     Comprehensive metabolic panel (BMP + Alb, " "Alk Phos, ALT, AST, Total. Bili, TP)     OFFICE/OUTPT VISIT,EST,LEVL IV      6. Female stress incontinence  N39.3 oxybutynin (DITROPAN) 5 MG tablet     OFFICE/OUTPT VISIT,EST,LEVL IV      7. Carpal tunnel syndrome of right wrist  G56.01 Wrist/Arm Supplies Order Wrist Brace; Right; non-thumb spica     OFFICE/OUTPT VISIT,EST,LEVL IV      8. Leg cramps  R25.2 OFFICE/OUTPT VISIT,EST,LEVL IV      2-5. medical conditions are stable. meds refilled. Work on Healthy diet and exercise. Getting heart rate elevated for 30 mins most days of week.  6. Start Oxybutynin. warning signs discussed. side effects discussed recheck 4 wks as needed.   7. Night wrist brace applied. Recheck 4 wks as needed.   warning signs discussed.   8. Discussed good hydration, exercise, stretching, otc multivitamine.   Labs pending      COUNSELING:  Reviewed preventive health counseling, as reflected in patient instructions       Regular exercise       Healthy diet/nutrition       Vision screening       Hearing screening       Dental care       Bladder control      BMI:   Estimated body mass index is 31.66 kg/m  as calculated from the following:    Height as of this encounter: 1.619 m (5' 3.75\").    Weight as of this encounter: 83 kg (183 lb).   Weight management plan: Discussed healthy diet and exercise guidelines      She reports that she quit smoking about 36 years ago. Her smoking use included cigarettes. She started smoking about 58 years ago. She has quit using smokeless tobacco.      Appropriate preventive services were discussed with this patient, including applicable screening as appropriate for cardiovascular disease, diabetes, osteopenia/osteoporosis, and glaucoma.  As appropriate for age/gender, discussed screening for colorectal cancer, prostate cancer, breast cancer, and cervical cancer. Checklist reviewing preventive services available has been given to the patient.    Reviewed patients plan of care and provided an AVS. The Basic Care " Plan (routine screening as documented in Health Maintenance) for Molly meets the Care Plan requirement. This Care Plan has been established and reviewed with the Patient.          Gil Dumas PA-C  [facility]    Identified Health Risks:    DME (Durable Medical Equipment) Orders and Documentation  Orders Placed This Encounter   Procedures     Wrist/Arm Supplies Order Wrist Brace; Right; non-thumb spica      The patient was assessed and it was determined the patient is in need of the following listed DME Supplies/Equipment. Please complete supporting documentation below to demonstrate medical necessity.      Wrist/Arm/Hand Bracing Supplies Documentation  Patient requires the use of the ordered bracing device due to following medical need/condition: CTS

## 2023-05-10 ENCOUNTER — MYC MEDICAL ADVICE (OUTPATIENT)
Dept: FAMILY MEDICINE | Facility: CLINIC | Age: 73
End: 2023-05-10
Payer: MEDICARE

## 2023-05-10 DIAGNOSIS — N39.3 FEMALE STRESS INCONTINENCE: Primary | ICD-10-CM

## 2023-05-10 NOTE — TELEPHONE ENCOUNTER
Patient wants to stop oxybutynin and start new medication.   Pharmacy pended.     Routing to provider to advise.  Elana ZHANGN, RN

## 2023-05-11 RX ORDER — TROSPIUM CHLORIDE 20 MG/1
20 TABLET, FILM COATED ORAL
Qty: 60 TABLET | Refills: 1 | Status: SHIPPED | OUTPATIENT
Start: 2023-05-11 | End: 2023-05-22

## 2023-05-16 DIAGNOSIS — N39.3 FEMALE STRESS INCONTINENCE: ICD-10-CM

## 2023-05-16 RX ORDER — TROSPIUM CHLORIDE 20 MG/1
20 TABLET, FILM COATED ORAL
Qty: 60 TABLET | Refills: 1 | OUTPATIENT
Start: 2023-05-16

## 2023-06-16 ENCOUNTER — MYC MEDICAL ADVICE (OUTPATIENT)
Dept: FAMILY MEDICINE | Facility: CLINIC | Age: 73
End: 2023-06-16
Payer: MEDICARE

## 2023-06-16 DIAGNOSIS — N39.3 FEMALE STRESS INCONTINENCE: ICD-10-CM

## 2023-06-19 RX ORDER — TROSPIUM CHLORIDE 20 MG/1
20 TABLET, FILM COATED ORAL
Qty: 180 TABLET | Refills: 1 | Status: SHIPPED | OUTPATIENT
Start: 2023-06-19 | End: 2023-11-08

## 2023-08-16 ENCOUNTER — E-VISIT (OUTPATIENT)
Dept: FAMILY MEDICINE | Facility: CLINIC | Age: 73
End: 2023-08-16
Payer: MEDICARE

## 2023-08-16 DIAGNOSIS — M79.676 PAIN OF TOE, UNSPECIFIED LATERALITY: Primary | ICD-10-CM

## 2023-08-16 PROCEDURE — 99207 PR NON-BILLABLE SERV PER CHARTING: CPT | Performed by: PHYSICIAN ASSISTANT

## 2023-08-17 NOTE — TELEPHONE ENCOUNTER
Called and spoke to patient, she was driving so asked me to call around 1 PM.           Oscar Bauer

## 2023-08-22 ENCOUNTER — OFFICE VISIT (OUTPATIENT)
Dept: FAMILY MEDICINE | Facility: CLINIC | Age: 73
End: 2023-08-22
Payer: MEDICARE

## 2023-08-22 ENCOUNTER — ANCILLARY PROCEDURE (OUTPATIENT)
Dept: GENERAL RADIOLOGY | Facility: CLINIC | Age: 73
End: 2023-08-22
Attending: PHYSICIAN ASSISTANT
Payer: MEDICARE

## 2023-08-22 VITALS
TEMPERATURE: 98.5 F | RESPIRATION RATE: 16 BRPM | HEART RATE: 87 BPM | DIASTOLIC BLOOD PRESSURE: 79 MMHG | WEIGHT: 176.8 LBS | HEIGHT: 64 IN | OXYGEN SATURATION: 96 % | SYSTOLIC BLOOD PRESSURE: 132 MMHG | BODY MASS INDEX: 30.19 KG/M2

## 2023-08-22 DIAGNOSIS — M79.674 GREAT TOE PAIN, RIGHT: ICD-10-CM

## 2023-08-22 DIAGNOSIS — M79.674 GREAT TOE PAIN, RIGHT: Primary | ICD-10-CM

## 2023-08-22 LAB
ERYTHROCYTE [SEDIMENTATION RATE] IN BLOOD BY WESTERGREN METHOD: 9 MM/HR (ref 0–30)
URATE SERPL-MCNC: 6.9 MG/DL (ref 2.4–5.7)

## 2023-08-22 PROCEDURE — 36415 COLL VENOUS BLD VENIPUNCTURE: CPT | Performed by: PHYSICIAN ASSISTANT

## 2023-08-22 PROCEDURE — 99214 OFFICE O/P EST MOD 30 MIN: CPT | Performed by: PHYSICIAN ASSISTANT

## 2023-08-22 PROCEDURE — 73660 X-RAY EXAM OF TOE(S): CPT | Mod: TC | Performed by: RADIOLOGY

## 2023-08-22 PROCEDURE — 85652 RBC SED RATE AUTOMATED: CPT | Performed by: PHYSICIAN ASSISTANT

## 2023-08-22 PROCEDURE — 84550 ASSAY OF BLOOD/URIC ACID: CPT | Performed by: PHYSICIAN ASSISTANT

## 2023-08-22 RX ORDER — ASPIRIN 325 MG
325 TABLET ORAL
COMMUNITY

## 2023-08-22 RX ORDER — PREDNISONE 20 MG/1
40 TABLET ORAL DAILY
Qty: 10 TABLET | Refills: 0 | Status: SHIPPED | OUTPATIENT
Start: 2023-08-22 | End: 2023-08-27

## 2023-08-22 ASSESSMENT — PAIN SCALES - GENERAL: PAINLEVEL: MODERATE PAIN (5)

## 2023-08-22 NOTE — PROGRESS NOTES
Assessment & Plan       ICD-10-CM    1. Great toe pain, right  M79.674 XR Toe Right G/E 2 Views     Uric acid     ESR: Erythrocyte sedimentation rate     predniSONE (DELTASONE) 20 MG tablet     Uric acid     ESR: Erythrocyte sedimentation rate        Talk to patient about her concerns.  At this point labs are pending.  Follow-up depend on lab results.  We will get her started on prednisone for possible gout..  Warning signs side effects were discussed.  Recheck in a week as needed.  If her symptoms persist we will have her follow-up with podiatry for second opinion.    SALAS Glez [facility]   Molly is a 72 year old, presenting for the following health issues:  Foot Problems        8/22/2023     8:07 AM   Additional Questions   Roomed by Benita BROWN   Accompanied by self       History of Present Illness       Reason for visit:  Bunion pain right great toe  Symptom onset:  3-7 days ago  Symptoms include:  Pain  Symptom intensity:  Severe  Symptom progression:  Staying the same  What makes it worse:  Bending toes right foot. Pressure on area  What makes it better:  Not walking. Tylenol and naprosyn help some.    She eats 2-3 servings of fruits and vegetables daily.She consumes 0 sweetened beverage(s) daily.She exercises with enough effort to increase her heart rate 20 to 29 minutes per day.  She exercises with enough effort to increase her heart rate 4 days per week.   She is taking medications regularly.     Patient is here today for right great toe pain for a little over a week.  She denies any injuries.  She describes it as a insidious onset of throbbing pains.  She cannot sleep at night.  Pain with walking.  No previous problems like this in the past.  She denies any numbness or tingling.  She denies any fevers chills or body aches.  She is not an alcohol drinker.    Review of Systems   Constitutional, HEENT, cardiovascular, pulmonary, gi and gu systems are  "negative, except as otherwise noted.      Objective    /79   Pulse 87   Temp 98.5  F (36.9  C) (Tympanic)   Resp 16   Ht 1.619 m (5' 3.75\")   Wt 80.2 kg (176 lb 12.8 oz)   SpO2 96%   BMI 30.59 kg/m    Body mass index is 30.59 kg/m .  Physical Exam   GENERAL: healthy, alert and no distress  Right great toe was red and swollen and tender diffusely of the first TP joint.  Pain with range of motion.  Calf soft nontender neuro vas intact distally.    X-ray right great to: neg. pending radiology  Labs pending                  "

## 2023-08-22 NOTE — RESULT ENCOUNTER NOTE
Ms. Ospina,    Your x-ray was read by the radiologist.   Mild arthritis of the joint.     Please contact the clinic if you have additional questions.  Thank you.    Sincerely,    Gil Dumas PA-C

## 2023-09-11 DIAGNOSIS — E78.1 HIGH TRIGLYCERIDES: ICD-10-CM

## 2023-09-11 DIAGNOSIS — E03.9 HYPOTHYROIDISM, UNSPECIFIED TYPE: ICD-10-CM

## 2023-09-11 RX ORDER — ROSUVASTATIN CALCIUM 10 MG/1
10 TABLET, COATED ORAL DAILY
Qty: 90 TABLET | Refills: 1 | Status: SHIPPED | OUTPATIENT
Start: 2023-09-11 | End: 2023-11-08

## 2023-09-11 RX ORDER — LEVOTHYROXINE SODIUM 75 UG/1
TABLET ORAL
Qty: 90 TABLET | Refills: 1 | Status: SHIPPED | OUTPATIENT
Start: 2023-09-11 | End: 2023-11-08

## 2023-09-21 NOTE — TELEPHONE ENCOUNTER
Patient has not been seen for this medication in over 1 year.   No appointment pending at this time.  Routing to provider to advise.    Elana Victor BSN, RN     Myalgia Treatment: I explained this is common when taking isotretinoin. If this worsens they will contact us. They may try OTC ibuprofen.

## 2023-11-08 ENCOUNTER — OFFICE VISIT (OUTPATIENT)
Dept: FAMILY MEDICINE | Facility: CLINIC | Age: 73
End: 2023-11-08
Payer: MEDICARE

## 2023-11-08 VITALS
BODY MASS INDEX: 30.56 KG/M2 | HEART RATE: 75 BPM | TEMPERATURE: 96.2 F | DIASTOLIC BLOOD PRESSURE: 70 MMHG | RESPIRATION RATE: 16 BRPM | HEIGHT: 64 IN | OXYGEN SATURATION: 96 % | WEIGHT: 179 LBS | SYSTOLIC BLOOD PRESSURE: 119 MMHG

## 2023-11-08 DIAGNOSIS — F33.0 MAJOR DEPRESSIVE DISORDER, RECURRENT EPISODE, MILD (H): Primary | ICD-10-CM

## 2023-11-08 DIAGNOSIS — I10 BENIGN ESSENTIAL HYPERTENSION: ICD-10-CM

## 2023-11-08 DIAGNOSIS — E03.9 HYPOTHYROIDISM, UNSPECIFIED TYPE: ICD-10-CM

## 2023-11-08 DIAGNOSIS — Z12.31 ENCOUNTER FOR SCREENING MAMMOGRAM FOR BREAST CANCER: ICD-10-CM

## 2023-11-08 DIAGNOSIS — E78.1 HIGH TRIGLYCERIDES: ICD-10-CM

## 2023-11-08 DIAGNOSIS — R10.11 RUQ ABDOMINAL PAIN: ICD-10-CM

## 2023-11-08 LAB
ALBUMIN SERPL BCG-MCNC: 4.9 G/DL (ref 3.5–5.2)
ALP SERPL-CCNC: 83 U/L (ref 35–104)
ALT SERPL W P-5'-P-CCNC: 26 U/L (ref 0–50)
ANION GAP SERPL CALCULATED.3IONS-SCNC: 13 MMOL/L (ref 7–15)
AST SERPL W P-5'-P-CCNC: 28 U/L (ref 0–45)
BILIRUB SERPL-MCNC: 0.5 MG/DL
BUN SERPL-MCNC: 11.7 MG/DL (ref 8–23)
CALCIUM SERPL-MCNC: 10.3 MG/DL (ref 8.8–10.2)
CHLORIDE SERPL-SCNC: 99 MMOL/L (ref 98–107)
CHOLEST SERPL-MCNC: 163 MG/DL
CREAT SERPL-MCNC: 0.71 MG/DL (ref 0.51–0.95)
DEPRECATED HCO3 PLAS-SCNC: 27 MMOL/L (ref 22–29)
EGFRCR SERPLBLD CKD-EPI 2021: 90 ML/MIN/1.73M2
GLUCOSE SERPL-MCNC: 92 MG/DL (ref 70–99)
HDLC SERPL-MCNC: 47 MG/DL
LDLC SERPL CALC-MCNC: 73 MG/DL
NONHDLC SERPL-MCNC: 116 MG/DL
POTASSIUM SERPL-SCNC: 5.2 MMOL/L (ref 3.4–5.3)
PROT SERPL-MCNC: 7.4 G/DL (ref 6.4–8.3)
SODIUM SERPL-SCNC: 139 MMOL/L (ref 135–145)
TRIGL SERPL-MCNC: 216 MG/DL
TSH SERPL DL<=0.005 MIU/L-ACNC: 3.26 UIU/ML (ref 0.3–4.2)

## 2023-11-08 PROCEDURE — 36415 COLL VENOUS BLD VENIPUNCTURE: CPT | Performed by: PHYSICIAN ASSISTANT

## 2023-11-08 PROCEDURE — 80053 COMPREHEN METABOLIC PANEL: CPT | Performed by: PHYSICIAN ASSISTANT

## 2023-11-08 PROCEDURE — 80061 LIPID PANEL: CPT | Performed by: PHYSICIAN ASSISTANT

## 2023-11-08 PROCEDURE — 99214 OFFICE O/P EST MOD 30 MIN: CPT | Performed by: PHYSICIAN ASSISTANT

## 2023-11-08 PROCEDURE — 84443 ASSAY THYROID STIM HORMONE: CPT | Performed by: PHYSICIAN ASSISTANT

## 2023-11-08 RX ORDER — TROSPIUM CHLORIDE 20 MG/1
20 TABLET, FILM COATED ORAL
Qty: 180 TABLET | Refills: 1 | Status: CANCELLED | OUTPATIENT
Start: 2023-11-08

## 2023-11-08 RX ORDER — LEVOTHYROXINE SODIUM 75 UG/1
75 TABLET ORAL DAILY
Qty: 90 TABLET | Refills: 1 | Status: SHIPPED | OUTPATIENT
Start: 2023-11-08 | End: 2024-05-28

## 2023-11-08 RX ORDER — ROSUVASTATIN CALCIUM 10 MG/1
10 TABLET, COATED ORAL DAILY
Qty: 90 TABLET | Refills: 1 | Status: SHIPPED | OUTPATIENT
Start: 2023-11-08 | End: 2024-05-28

## 2023-11-08 RX ORDER — CITALOPRAM HYDROBROMIDE 20 MG/1
20 TABLET ORAL DAILY
Qty: 90 TABLET | Refills: 1 | Status: SHIPPED | OUTPATIENT
Start: 2023-11-08 | End: 2024-05-28

## 2023-11-08 RX ORDER — HYDROCHLOROTHIAZIDE 25 MG/1
25 TABLET ORAL DAILY
Qty: 90 TABLET | Refills: 1 | Status: SHIPPED | OUTPATIENT
Start: 2023-11-08 | End: 2024-07-12

## 2023-11-08 RX ORDER — LISINOPRIL 20 MG/1
20 TABLET ORAL DAILY
Qty: 90 TABLET | Refills: 1 | Status: SHIPPED | OUTPATIENT
Start: 2023-11-08 | End: 2024-05-28

## 2023-11-08 RX ORDER — METOPROLOL SUCCINATE 50 MG/1
50 TABLET, EXTENDED RELEASE ORAL DAILY
Qty: 90 TABLET | Refills: 1 | Status: SHIPPED | OUTPATIENT
Start: 2023-11-08 | End: 2024-05-28

## 2023-11-08 ASSESSMENT — PAIN SCALES - GENERAL: PAINLEVEL: NO PAIN (0)

## 2023-11-08 NOTE — PROGRESS NOTES
"  Assessment & Plan       ICD-10-CM    1. Major depressive disorder, recurrent episode, mild (H24)  F33.0 citalopram (CELEXA) 20 MG tablet      2. Benign essential hypertension  I10 hydrochlorothiazide (HYDRODIURIL) 25 MG tablet     lisinopril (ZESTRIL) 20 MG tablet     metoprolol succinate ER (TOPROL XL) 50 MG 24 hr tablet     Comprehensive metabolic panel (BMP + Alb, Alk Phos, ALT, AST, Total. Bili, TP)     Comprehensive metabolic panel (BMP + Alb, Alk Phos, ALT, AST, Total. Bili, TP)      3. Hypothyroidism, unspecified type  E03.9 levothyroxine (SYNTHROID/LEVOTHROID) 75 MCG tablet     TSH with free T4 reflex     TSH with free T4 reflex      4. High triglycerides  E78.1 rosuvastatin (CRESTOR) 10 MG tablet     Lipid panel reflex to direct LDL Fasting     Lipid panel reflex to direct LDL Fasting      5. RUQ abdominal pain  R10.11 US Abdomen Complete      6. Encounter for screening mammogram for breast cancer  Z12.31 *MA Screening Digital Bilateral        medical conditions are stable. meds refilled.  Work on Healthy diet and exercise. Getting heart rate elevated for 30 mins most days of week.  Labs pending  5.  Unclear etiology possible gallbladder related.  We talked about getting ultrasound she is going to think about this.  Warning signs discussed follow-up depend on the results.  Recheck 6 months.     BMI:   Estimated body mass index is 30.97 kg/m  as calculated from the following:    Height as of this encounter: 1.619 m (5' 3.75\").    Weight as of this encounter: 81.2 kg (179 lb).   Weight management plan: Discussed healthy diet and exercise guidelines      [facility]   Molly is a 72 year old, presenting for the following health issues:  Recheck Medication    History of Present Illness       Reason for visit:  Labs and meds visit. General before heading to [place]  on 12/01/23.    She eats 0-1 servings of fruits and vegetables daily.She consumes 0 " sweetened beverage(s) daily.She exercises with enough effort to increase her heart rate 20 to 29 minutes per day.  She exercises with enough effort to increase her heart rate 4 days per week.   She is taking medications regularly.   Hyperlipidemia Follow-Up    Are you regularly taking any medication or supplement to lower your cholesterol?   Yes- crestor  Are you having muscle aches or other side effects that you think could be caused by your cholesterol lowering medication?  No    Hypertension Follow-up    Do you check your blood pressure regularly outside of the clinic? Yes   Are you following a low salt diet? Yes  Are your blood pressures ever more than 140 on the top number (systolic) OR more   than 90 on the bottom number (diastolic), for example 140/90? No    Depression Followup  How are you doing with your depression since your last visit? Stable doing will  Are you having other symptoms that might be associated with depression? No  Have you had a significant life event?  No   Are you feeling anxious or having panic attacks?   No  Do you have any concerns with your use of alcohol or other drugs? No    Social History     Tobacco Use    Smoking status: Former     Packs/day: 0.00     Years: 0.00     Additional pack years: 0.00     Total pack years: 0.00     Types: Cigarettes     Start date: 1965     Quit date: 1986     Years since quittin.0    Smokeless tobacco: Former   Vaping Use    Vaping Use: Never used   Substance Use Topics    Alcohol use: Yes     Comment: Occasional    Drug use: No         2022     8:52 AM 2023     9:08 AM 2023     8:44 AM   PHQ   PHQ-9 Total Score 2 3 3   Q9: Thoughts of better off dead/self-harm past 2 weeks Not at all Not at all Not at all         2021     9:19 AM 2021    10:32 AM 10/26/2021     9:57 AM   KRYSTYNA-7 SCORE   Total Score 10 (moderate anxiety) 6 (mild anxiety) 6 (mild anxiety)   Total Score 10 6 6       Hypothyroidism Follow-up    Since  "last visit, patient describes the following symptoms: Weight stable, no hair loss, no skin changes, no constipation, no loose stools       Abdominal pain off and on for a few minutes.  Occasionally occur with eating.  She describes it as a tight band around her rib cage.  No nausea vomiting diarrhea.    Review of Systems   Constitutional, HEENT, cardiovascular, pulmonary, gi and gu systems are negative, except as otherwise noted.      Objective    /70   Pulse 75   Temp (!) 96.2  F (35.7  C) (Tympanic)   Resp 16   Ht 1.619 m (5' 3.75\")   Wt 81.2 kg (179 lb)   SpO2 96%   BMI 30.97 kg/m    Body mass index is 30.97 kg/m .  Physical Exam   GENERAL: healthy, alert and no distress  EYES: Eyes grossly normal to inspection, PERRL and conjunctivae and sclerae normal  HENT: ear canals and TM's normal, nose and mouth without ulcers or lesions  NECK: no adenopathy, no asymmetry, masses, or scars and thyroid normal to palpation  RESP: lungs clear to auscultation - no rales, rhonchi or wheezes  CV: regular rate and rhythm, normal S1 S2, no S3 or S4, no murmur, click or rub, no peripheral edema and peripheral pulses strong  ABDOMEN: soft, nontender, no hepatosplenomegaly, no masses and bowel sounds normal  MS: no gross musculoskeletal defects noted, no edema  SKIN: no suspicious lesions or rashes  NEURO: Normal strength and tone, mentation intact and speech normal  PSYCH: mentation appears normal, affect normal/bright    Labs pending                "

## 2023-11-13 ENCOUNTER — DOCUMENTATION ONLY (OUTPATIENT)
Dept: OTHER | Facility: CLINIC | Age: 73
End: 2023-11-13
Payer: MEDICARE

## 2023-11-16 ENCOUNTER — ANCILLARY PROCEDURE (OUTPATIENT)
Dept: MAMMOGRAPHY | Facility: CLINIC | Age: 73
End: 2023-11-16
Attending: PHYSICIAN ASSISTANT
Payer: MEDICARE

## 2023-11-16 DIAGNOSIS — Z12.31 VISIT FOR SCREENING MAMMOGRAM: ICD-10-CM

## 2023-11-16 PROCEDURE — 77063 BREAST TOMOSYNTHESIS BI: CPT | Mod: TC | Performed by: STUDENT IN AN ORGANIZED HEALTH CARE EDUCATION/TRAINING PROGRAM

## 2023-11-16 PROCEDURE — 77067 SCR MAMMO BI INCL CAD: CPT | Mod: TC | Performed by: STUDENT IN AN ORGANIZED HEALTH CARE EDUCATION/TRAINING PROGRAM

## 2024-04-02 ENCOUNTER — PATIENT OUTREACH (OUTPATIENT)
Dept: CARE COORDINATION | Facility: CLINIC | Age: 74
End: 2024-04-02
Payer: MEDICARE

## 2024-04-16 ENCOUNTER — PATIENT OUTREACH (OUTPATIENT)
Dept: CARE COORDINATION | Facility: CLINIC | Age: 74
End: 2024-04-16
Payer: MEDICARE

## 2024-05-28 DIAGNOSIS — F33.0 MAJOR DEPRESSIVE DISORDER, RECURRENT EPISODE, MILD (H): ICD-10-CM

## 2024-05-28 DIAGNOSIS — I10 BENIGN ESSENTIAL HYPERTENSION: ICD-10-CM

## 2024-05-28 DIAGNOSIS — E78.1 HIGH TRIGLYCERIDES: ICD-10-CM

## 2024-05-28 DIAGNOSIS — E03.9 HYPOTHYROIDISM, UNSPECIFIED TYPE: ICD-10-CM

## 2024-05-28 RX ORDER — CITALOPRAM HYDROBROMIDE 20 MG/1
20 TABLET ORAL DAILY
Qty: 90 TABLET | Refills: 0 | Status: SHIPPED | OUTPATIENT
Start: 2024-05-28 | End: 2024-08-20

## 2024-05-28 RX ORDER — ROSUVASTATIN CALCIUM 10 MG/1
10 TABLET, COATED ORAL DAILY
Qty: 90 TABLET | Refills: 1 | Status: SHIPPED | OUTPATIENT
Start: 2024-05-28 | End: 2024-08-20

## 2024-05-28 RX ORDER — METOPROLOL SUCCINATE 50 MG/1
50 TABLET, EXTENDED RELEASE ORAL DAILY
Qty: 90 TABLET | Refills: 1 | Status: SHIPPED | OUTPATIENT
Start: 2024-05-28

## 2024-05-28 RX ORDER — LEVOTHYROXINE SODIUM 75 UG/1
75 TABLET ORAL DAILY
Qty: 90 TABLET | Refills: 1 | Status: SHIPPED | OUTPATIENT
Start: 2024-05-28 | End: 2024-08-20

## 2024-05-28 RX ORDER — LISINOPRIL 20 MG/1
20 TABLET ORAL DAILY
Qty: 90 TABLET | Refills: 1 | Status: SHIPPED | OUTPATIENT
Start: 2024-05-28 | End: 2024-08-20

## 2024-06-26 ENCOUNTER — OFFICE VISIT (OUTPATIENT)
Dept: URGENT CARE | Facility: URGENT CARE | Age: 74
End: 2024-06-26
Payer: MEDICARE

## 2024-06-26 ENCOUNTER — ANCILLARY PROCEDURE (OUTPATIENT)
Dept: GENERAL RADIOLOGY | Facility: CLINIC | Age: 74
End: 2024-06-26
Attending: NURSE PRACTITIONER
Payer: MEDICARE

## 2024-06-26 VITALS
TEMPERATURE: 98.4 F | DIASTOLIC BLOOD PRESSURE: 62 MMHG | SYSTOLIC BLOOD PRESSURE: 139 MMHG | RESPIRATION RATE: 20 BRPM | HEART RATE: 75 BPM | BODY MASS INDEX: 30.79 KG/M2 | WEIGHT: 178 LBS | OXYGEN SATURATION: 97 %

## 2024-06-26 DIAGNOSIS — Z87.39 HISTORY OF GOUT: ICD-10-CM

## 2024-06-26 DIAGNOSIS — M79.89 PAIN AND SWELLING OF TOE OF LEFT FOOT: Primary | ICD-10-CM

## 2024-06-26 DIAGNOSIS — M79.675 PAIN AND SWELLING OF TOE OF LEFT FOOT: Primary | ICD-10-CM

## 2024-06-26 PROBLEM — I51.9 HEART DISEASE, UNSPECIFIED: Status: ACTIVE | Noted: 2024-06-26

## 2024-06-26 PROBLEM — M51.16 LUMBAR DISC HERNIATION WITH RADICULOPATHY: Status: ACTIVE | Noted: 2020-05-13

## 2024-06-26 LAB — URATE SERPL-MCNC: 6.9 MG/DL (ref 2.4–5.7)

## 2024-06-26 PROCEDURE — 73630 X-RAY EXAM OF FOOT: CPT | Mod: TC | Performed by: RADIOLOGY

## 2024-06-26 PROCEDURE — 99213 OFFICE O/P EST LOW 20 MIN: CPT | Performed by: NURSE PRACTITIONER

## 2024-06-26 PROCEDURE — 84550 ASSAY OF BLOOD/URIC ACID: CPT | Performed by: NURSE PRACTITIONER

## 2024-06-26 PROCEDURE — 36415 COLL VENOUS BLD VENIPUNCTURE: CPT | Performed by: NURSE PRACTITIONER

## 2024-06-26 RX ORDER — PREDNISONE 20 MG/1
TABLET ORAL
Qty: 20 TABLET | Refills: 0 | Status: SHIPPED | OUTPATIENT
Start: 2024-06-26 | End: 2024-08-20

## 2024-06-26 RX ORDER — LATANOPROST 50 UG/ML
1 SOLUTION/ DROPS OPHTHALMIC AT BEDTIME
COMMUNITY
Start: 2024-05-24

## 2024-06-26 NOTE — PROGRESS NOTES
Assessment & Plan      Diagnosis Comments   1. Pain and swelling of toe of left foot  Uric acid, XR Foot Left G/E 3 Views, predniSONE (DELTASONE) 20 MG tablet       2. History of gout  Uric acid, XR Foot Left G/E 3 Views, predniSONE (DELTASONE) 20 MG tablet       Symptoms likely related to gout flare we discussed home care including elevation, rest prescribed prednisone discussed side effects this medication she has had this in the past for similar episode.  Will also check a uric acid level.  X-ray no indication for fracture or break bone spurring and area of pain.  VALENTINO [facility]wood [facility] URGENT [facility]     Molly is a 73 year old female who presents to clinic today for the following health issues:  Chief Complaint   Patient presents with    Urgent Care     Swelling with pain of left foot that started yesterday morning.        HPI    Patient presents to clinic with left dorsal foot swelling and tenderness near toes 4 and 5.  States acute onset.  She has a history of gout.  Denies any recent injury.  Denies fever    Review of Systems  Constitutional, HEENT, cardiovascular, pulmonary, gi and gu systems are negative, except as otherwise noted.      Objective    /62   Pulse 75   Temp 98.4  F (36.9  C) (Tympanic)   Resp 20   Wt 80.7 kg (178 lb)   SpO2 97%   BMI 30.79 kg/m    Physical Exam   GENERAL: alert and no distress  NECK: no adenopathy, no asymmetry, masses, or scars  RESP: lungs clear to auscultation - no rales, rhonchi or wheezes  CV: regular rate and rhythm, normal S1 S2, no S3 or S4, no murmur, click or rub, no peripheral edema  MS: Left foot pain dorsal aspect tenderness with palpation and warmth noted along with swelling.  CMS is intact normal pulses.SKIN: no suspicious lesions or rashes    Results for orders placed or performed in visit on 06/26/24   XR Foot Left G/E 3 Views     Status: None    Narrative    XR FOOT LEFT G/E 3 VIEWS 6/26/2024 11:19 AM      HISTORY: Pain and swelling of toe of left foot; Pain and swelling of  toe of left foot; History of gout    COMPARISON: None.       Impression    IMPRESSION: The left foot is negative for fracture. Mild degenerative  change first MTP joint and IP joint of the toe. Plantar and Achilles  calcaneal spurring.    SEAMUS BETANCUR MD         SYSTEM ID:  [id number]

## 2024-06-30 ENCOUNTER — HEALTH MAINTENANCE LETTER (OUTPATIENT)
Age: 74
End: 2024-06-30

## 2024-07-12 DIAGNOSIS — I10 BENIGN ESSENTIAL HYPERTENSION: ICD-10-CM

## 2024-07-12 RX ORDER — HYDROCHLOROTHIAZIDE 25 MG/1
25 TABLET ORAL DAILY
Qty: 90 TABLET | Refills: 0 | Status: SHIPPED | OUTPATIENT
Start: 2024-07-12 | End: 2024-08-20

## 2024-08-16 SDOH — HEALTH STABILITY: PHYSICAL HEALTH: ON AVERAGE, HOW MANY DAYS PER WEEK DO YOU ENGAGE IN MODERATE TO STRENUOUS EXERCISE (LIKE A BRISK WALK)?: 2 DAYS

## 2024-08-16 SDOH — HEALTH STABILITY: PHYSICAL HEALTH: ON AVERAGE, HOW MANY MINUTES DO YOU ENGAGE IN EXERCISE AT THIS LEVEL?: 40 MIN

## 2024-08-16 ASSESSMENT — SOCIAL DETERMINANTS OF HEALTH (SDOH): HOW OFTEN DO YOU GET TOGETHER WITH FRIENDS OR RELATIVES?: ONCE A WEEK

## 2024-08-20 ENCOUNTER — OFFICE VISIT (OUTPATIENT)
Dept: FAMILY MEDICINE | Facility: CLINIC | Age: 74
End: 2024-08-20
Payer: COMMERCIAL

## 2024-08-20 VITALS
BODY MASS INDEX: 31.89 KG/M2 | HEIGHT: 63 IN | HEART RATE: 71 BPM | RESPIRATION RATE: 16 BRPM | DIASTOLIC BLOOD PRESSURE: 65 MMHG | OXYGEN SATURATION: 96 % | WEIGHT: 180 LBS | TEMPERATURE: 97 F | SYSTOLIC BLOOD PRESSURE: 116 MMHG

## 2024-08-20 DIAGNOSIS — Z00.00 ENCOUNTER FOR MEDICARE ANNUAL WELLNESS EXAM: Primary | ICD-10-CM

## 2024-08-20 DIAGNOSIS — E03.9 HYPOTHYROIDISM, UNSPECIFIED TYPE: ICD-10-CM

## 2024-08-20 DIAGNOSIS — Z13.1 SCREENING FOR DIABETES MELLITUS: ICD-10-CM

## 2024-08-20 DIAGNOSIS — Z12.31 ENCOUNTER FOR SCREENING MAMMOGRAM FOR BREAST CANCER: ICD-10-CM

## 2024-08-20 DIAGNOSIS — Z87.39 HX OF GOUT: ICD-10-CM

## 2024-08-20 DIAGNOSIS — E78.1 HIGH TRIGLYCERIDES: ICD-10-CM

## 2024-08-20 DIAGNOSIS — F33.0 MAJOR DEPRESSIVE DISORDER, RECURRENT EPISODE, MILD (H): ICD-10-CM

## 2024-08-20 DIAGNOSIS — I10 BENIGN ESSENTIAL HYPERTENSION: ICD-10-CM

## 2024-08-20 DIAGNOSIS — G56.03 BILATERAL CARPAL TUNNEL SYNDROME: ICD-10-CM

## 2024-08-20 LAB
BASOPHILS # BLD AUTO: 0.1 10E3/UL (ref 0–0.2)
BASOPHILS NFR BLD AUTO: 1 %
EOSINOPHIL # BLD AUTO: 0.2 10E3/UL (ref 0–0.7)
EOSINOPHIL NFR BLD AUTO: 2 %
ERYTHROCYTE [DISTWIDTH] IN BLOOD BY AUTOMATED COUNT: 12.9 % (ref 10–15)
HCT VFR BLD AUTO: 36.2 % (ref 35–47)
HGB BLD-MCNC: 12 G/DL (ref 11.7–15.7)
IMM GRANULOCYTES # BLD: 0 10E3/UL
IMM GRANULOCYTES NFR BLD: 0 %
LYMPHOCYTES # BLD AUTO: 1.9 10E3/UL (ref 0.8–5.3)
LYMPHOCYTES NFR BLD AUTO: 19 %
MCH RBC QN AUTO: 29.9 PG (ref 26.5–33)
MCHC RBC AUTO-ENTMCNC: 33.1 G/DL (ref 31.5–36.5)
MCV RBC AUTO: 90 FL (ref 78–100)
MONOCYTES # BLD AUTO: 1 10E3/UL (ref 0–1.3)
MONOCYTES NFR BLD AUTO: 10 %
NEUTROPHILS # BLD AUTO: 6.6 10E3/UL (ref 1.6–8.3)
NEUTROPHILS NFR BLD AUTO: 68 %
PLATELET # BLD AUTO: 285 10E3/UL (ref 150–450)
RBC # BLD AUTO: 4.01 10E6/UL (ref 3.8–5.2)
WBC # BLD AUTO: 9.9 10E3/UL (ref 4–11)

## 2024-08-20 PROCEDURE — 80061 LIPID PANEL: CPT | Performed by: PHYSICIAN ASSISTANT

## 2024-08-20 PROCEDURE — 36415 COLL VENOUS BLD VENIPUNCTURE: CPT | Performed by: PHYSICIAN ASSISTANT

## 2024-08-20 PROCEDURE — 84443 ASSAY THYROID STIM HORMONE: CPT | Performed by: PHYSICIAN ASSISTANT

## 2024-08-20 PROCEDURE — 99214 OFFICE O/P EST MOD 30 MIN: CPT | Mod: 25 | Performed by: PHYSICIAN ASSISTANT

## 2024-08-20 PROCEDURE — G0439 PPPS, SUBSEQ VISIT: HCPCS | Performed by: PHYSICIAN ASSISTANT

## 2024-08-20 PROCEDURE — 84550 ASSAY OF BLOOD/URIC ACID: CPT | Performed by: PHYSICIAN ASSISTANT

## 2024-08-20 PROCEDURE — 85025 COMPLETE CBC W/AUTO DIFF WBC: CPT | Performed by: PHYSICIAN ASSISTANT

## 2024-08-20 PROCEDURE — 80053 COMPREHEN METABOLIC PANEL: CPT | Performed by: PHYSICIAN ASSISTANT

## 2024-08-20 RX ORDER — LOSARTAN POTASSIUM 50 MG/1
50 TABLET ORAL DAILY
Qty: 90 TABLET | Refills: 1 | Status: SHIPPED | OUTPATIENT
Start: 2024-08-20 | End: 2024-10-01

## 2024-08-20 RX ORDER — LEVOTHYROXINE SODIUM 75 UG/1
75 TABLET ORAL DAILY
Qty: 90 TABLET | Refills: 1 | Status: SHIPPED | OUTPATIENT
Start: 2024-08-20

## 2024-08-20 RX ORDER — ROSUVASTATIN CALCIUM 10 MG/1
10 TABLET, COATED ORAL DAILY
Qty: 90 TABLET | Refills: 1 | Status: SHIPPED | OUTPATIENT
Start: 2024-08-20

## 2024-08-20 RX ORDER — CITALOPRAM HYDROBROMIDE 20 MG/1
20 TABLET ORAL DAILY
Qty: 90 TABLET | Refills: 1 | Status: SHIPPED | OUTPATIENT
Start: 2024-08-20

## 2024-08-20 ASSESSMENT — PATIENT HEALTH QUESTIONNAIRE - PHQ9
10. IF YOU CHECKED OFF ANY PROBLEMS, HOW DIFFICULT HAVE THESE PROBLEMS MADE IT FOR YOU TO DO YOUR WORK, TAKE CARE OF THINGS AT HOME, OR GET ALONG WITH OTHER PEOPLE: SOMEWHAT DIFFICULT
SUM OF ALL RESPONSES TO PHQ QUESTIONS 1-9: 5
SUM OF ALL RESPONSES TO PHQ QUESTIONS 1-9: 5

## 2024-08-20 ASSESSMENT — PAIN SCALES - GENERAL: PAINLEVEL: NO PAIN (0)

## 2024-08-20 NOTE — PATIENT INSTRUCTIONS
Patient Education   Preventive Care Advice   This is general advice given by our system to help you stay healthy. However, your care team may have specific advice just for you. Please talk to your care team about your preventive care needs.  Nutrition  Eat 5 or more servings of fruits and vegetables each day.  Try wheat bread, brown rice and whole grain pasta (instead of white bread, rice, and pasta).  Get enough calcium and vitamin D. Check the label on foods and aim for 100% of the RDA (recommended daily allowance).  Lifestyle  Exercise at least 150 minutes each week  (30 minutes a day, 5 days a week).  Do muscle strengthening activities 2 days a week. These help control your weight and prevent disease.  No smoking.  Wear sunscreen to prevent skin cancer.  Have a dental exam and cleaning every 6 months.  Yearly exams  See your health care team every year to talk about:  Any changes in your health.  Any medicines your care team has prescribed.  Preventive care, family planning, and ways to prevent chronic diseases.  Shots (vaccines)   HPV shots (up to age 26), if you've never had them before.  Hepatitis B shots (up to age 59), if you've never had them before.  COVID-19 shot: Get this shot when it's due.  Flu shot: Get a flu shot every year.  Tetanus shot: Get a tetanus shot every 10 years.  Pneumococcal, hepatitis A, and RSV shots: Ask your care team if you need these based on your risk.  Shingles shot (for age 50 and up)  General health tests  Diabetes screening:  Starting at age 35, Get screened for diabetes at least every 3 years.  If you are younger than age 35, ask your care team if you should be screened for diabetes.  Cholesterol test: At age 39, start having a cholesterol test every 5 years, or more often if advised.  Bone density scan (DEXA): At age 50, ask your care team if you should have this scan for osteoporosis (brittle bones).  Hepatitis C: Get tested at least once in your life.  STIs (sexually  transmitted infections)  Before age 24: Ask your care team if you should be screened for STIs.  After age 24: Get screened for STIs if you're at risk. You are at risk for STIs (including HIV) if:  You are sexually active with more than one person.  You don't use condoms every time.  You or a partner was diagnosed with a sexually transmitted infection.  If you are at risk for HIV, ask about PrEP medicine to prevent HIV.  Get tested for HIV at least once in your life, whether you are at risk for HIV or not.  Cancer screening tests  Cervical cancer screening: If you have a cervix, begin getting regular cervical cancer screening tests starting at age 21.  Breast cancer scan (mammogram): If you've ever had breasts, begin having regular mammograms starting at age 40. This is a scan to check for breast cancer.  Colon cancer screening: It is important to start screening for colon cancer at age 45.  Have a colonoscopy test every 10 years (or more often if you're at risk) Or, ask your provider about stool tests like a FIT test every year or Cologuard test every 3 years.  To learn more about your testing options, visit:   .  For help making a decision, visit:   https://bit.ly/ml30414.  Prostate cancer screening test: If you have a prostate, ask your care team if a prostate cancer screening test (PSA) at age 55 is right for you.  Lung cancer screening: If you are a current or former smoker ages 50 to 80, ask your care team if ongoing lung cancer screenings are right for you.  For informational purposes only. Not to replace the advice of your health care provider. Copyright   2023 [facility]. All rights reserved. Clinically reviewed by the [facility] Transitions Program. [place] (RTP) 764110 - REV 01/24.  Preventing Falls: Care Instructions  Injuries and health problems such as trouble walking or poor eyesight can increase your risk of falling. So can some medicines. But there are things you can do to help  "prevent falls. You can exercise to get stronger. You can also arrange your home to make it safer.    Talk to your doctor about the medicines you take. Ask if any of them increase the risk of falls and whether they can be changed or stopped.   Try to exercise regularly. It can help improve your strength and balance. This can help lower your risk of falling.     Practice fall safety and prevention.    Wear low-heeled shoes that fit well and give your feet good support. Talk to your doctor if you have foot problems that make this hard.  Carry a cellphone or wear a medical alert device that you can use to call for help.  Use stepladders instead of chairs to reach high objects. Don't climb if you're at risk for falls. Ask for help, if needed.  Wear the correct eyeglasses, if you need them.    Make your home safer.    Remove rugs, cords, clutter, and furniture from walkways.  Keep your house well lit. Use night-lights in hallways and bathrooms.  Install and use sturdy handrails on stairways.  Wear nonskid footwear, even inside. Don't walk barefoot or in socks without shoes.    Be safe outside.    Use handrails, curb cuts, and ramps whenever possible.  Keep your hands free by using a shoulder bag or backpack.  Try to walk in well-lit areas. Watch out for uneven ground, changes in pavement, and debris.  Be careful in the winter. Walk on the grass or gravel when sidewalks are slippery. Use de-icer on steps and walkways. Add non-slip devices to shoes.    Put grab bars and nonskid mats in your shower or tub and near the toilet. Try to use a shower chair or bath bench when bathing.   Get into a tub or shower by putting in your weaker leg first. Get out with your strong side first. Have a phone or medical alert device in the bathroom with you.   Where can you learn more?  Go to https://www.[employer].net/patiented  Enter G117 in the search box to learn more about \"Preventing Falls: Care Instructions.\"  Current as of: July 17, " 2023               Content Version: 14.0    3472-5633 [employer].   Care instructions adapted under license by your healthcare professional. If you have questions about a medical condition or this instruction, always ask your healthcare professional. [employer] disclaims any warranty or liability for your use of this information.      Hearing Loss: Care Instructions  Overview     Hearing loss is a sudden or slow decrease in how well you hear. It can range from slight to profound. Permanent hearing loss can occur with aging. It also can happen when you are exposed long-term to loud noise. Examples include listening to loud music, riding motorcycles, or being around other loud machines.  Hearing loss can affect your work and home life. It can make you feel lonely or depressed. You may feel that you have lost your independence. But hearing aids and other devices can help you hear better and feel connected to others.  Follow-up care is a key part of your treatment and safety. Be sure to make and go to all appointments, and call your doctor if you are having problems. It's also a good idea to know your test results and keep a list of the medicines you take.  How can you care for yourself at home?  Avoid loud noises whenever possible. This helps keep your hearing from getting worse.  Always wear hearing protection around loud noises.  Wear a hearing aid as directed.  A professional can help you pick a hearing aid that will work best for you.  You can also get hearing aids over the counter for mild to moderate hearing loss.  Have hearing tests as your doctor suggests. They can show whether your hearing has changed. Your hearing aid may need to be adjusted.  Use other devices as needed. These may include:  Telephone amplifiers and hearing aids that can connect to a television, stereo, radio, or microphone.  Devices that use lights or vibrations. These alert you to the doorbell, a ringing  "telephone, or a baby monitor.  Television closed-captioning. This shows the words at the bottom of the screen. Most new TVs can do this.  TTY (text telephone). This lets you type messages back and forth on the telephone instead of talking or listening. These devices are also called TDD. When messages are typed on the keyboard, they are sent over the phone line to a receiving TTY. The message is shown on a monitor.  Use text messaging, social media, and email if it is hard for you to communicate by telephone.  Try to learn a listening technique called speechreading. It is not lipreading. You pay attention to people's gestures, expressions, posture, and tone of voice. These clues can help you understand what a person is saying. Face the person you are talking to, and have them face you. Make sure the lighting is good. You need to see the other person's face clearly.  Think about counseling if you need help to adjust to your hearing loss.  When should you call for help?  Watch closely for changes in your health, and be sure to contact your doctor if:    You think your hearing is getting worse.     You have new symptoms, such as dizziness or nausea.   Where can you learn more?  Go to https://www.[employer].net/patiented  Enter R798 in the search box to learn more about \"Hearing Loss: Care Instructions.\"  Current as of: September 27, 2023               Content Version: 14.0    3276-9870 [employer].   Care instructions adapted under license by your healthcare professional. If you have questions about a medical condition or this instruction, always ask your healthcare professional. [employer] disclaims any warranty or liability for your use of this information.      Bladder Training: Care Instructions  Your Care Instructions     Bladder training is used to treat urge incontinence and stress incontinence. Urge incontinence means that the need to urinate comes on so fast that you can't get to a " toilet in time. Stress incontinence means that you leak urine because of pressure on your bladder. For example, it may happen when you laugh, cough, or lift something heavy.  Bladder training can increase how long you can wait before you have to urinate. It can also help your bladder hold more urine. And it can give you better control over the urge to urinate.  It is important to remember that bladder training takes a few weeks to a few months to make a difference. You may not see results right away, but don't give up.  Follow-up care is a key part of your treatment and safety. Be sure to make and go to all appointments, and call your doctor if you are having problems. It's also a good idea to know your test results and keep a list of the medicines you take.  How can you care for yourself at home?  Work with your doctor to come up with a bladder training program that is right for you. You may use one or more of the following methods.  Delayed urination  In the beginning, try to keep from urinating for 5 minutes after you first feel the need to go.  While you wait, take deep, slow breaths to relax. Kegel exercises can also help you delay the need to go to the bathroom.  After some practice, when you can easily wait 5 minutes to urinate, try to wait 10 minutes before you urinate.  Slowly increase the waiting period until you are able to control when you have to urinate.  Scheduled urination  Empty your bladder when you first wake up in the morning.  Schedule times throughout the day when you will urinate.  Start by going to the bathroom every hour, even if you don't need to go.  Slowly increase the time between trips to the bathroom.  When you have found a schedule that works well for you, keep doing it.  If you wake up during the night and have to urinate, do it. Apply your schedule to waking hours only.  Kegel exercises  These tighten and strengthen pelvic muscles, which can help you control the flow of urine. (If  "doing these exercises causes pain, stop doing them and talk with your doctor.) To do Kegel exercises:  Squeeze your muscles as if you were trying not to pass gas. Or squeeze your muscles as if you were stopping the flow of urine. Your belly, legs, and buttocks shouldn't move.  Hold the squeeze for 3 seconds, then relax for 5 to 10 seconds.  Start with 3 seconds, then add 1 second each week until you are able to squeeze for 10 seconds.  Repeat the exercise 10 times a session. Do 3 to 8 sessions a day.  When should you call for help?  Watch closely for changes in your health, and be sure to contact your doctor if:    Your incontinence is getting worse.     You do not get better as expected.   Where can you learn more?  Go to https://www.[employer].net/patiented  Enter V684 in the search box to learn more about \"Bladder Training: Care Instructions.\"  Current as of: November 15, 2023               Content Version: 14.0    5067-0870 [employer].   Care instructions adapted under license by your healthcare professional. If you have questions about a medical condition or this instruction, always ask your healthcare professional. [employer] disclaims any warranty or liability for your use of this information.         "

## 2024-08-20 NOTE — PROGRESS NOTES
Preventive Care Visit  [facility]  Gil Dumas PA-C, Family Medicine  Aug 20, 2024      Assessment & Plan       ICD-10-CM    1. Encounter for Medicare annual wellness exam  Z00.00       2. High triglycerides  E78.1 Lipid panel reflex to direct LDL Fasting     rosuvastatin (CRESTOR) 10 MG tablet     Lipid panel reflex to direct LDL Fasting     OFFICE/OUTPT VISIT,EST,LEVL IV      3. Hypothyroidism, unspecified type  E03.9 levothyroxine (SYNTHROID/LEVOTHROID) 75 MCG tablet     TSH with free T4 reflex     TSH with free T4 reflex     OFFICE/OUTPT VISIT,EST,LEVL IV      4. Major depressive disorder, recurrent episode, mild (H24)  F33.0 citalopram (CELEXA) 20 MG tablet     OFFICE/OUTPT VISIT,EST,LEVL IV      5. Benign essential hypertension  I10 losartan (COZAAR) 50 MG tablet     CBC with platelets and differential     CBC with platelets and differential     OFFICE/OUTPT VISIT,EST,LEVL IV      6. Bilateral carpal tunnel syndrome  G56.03 Occupational Therapy  Referral     OFFICE/OUTPT VISIT,EST,LEVL IV      7. Hx of gout  Z87.39 Uric acid     Uric acid     OFFICE/OUTPT VISIT,EST,LEVL IV      8. Screening for diabetes mellitus  Z13.1 Comprehensive metabolic panel (BMP + Alb, Alk Phos, ALT, AST, Total. Bili, TP)     Comprehensive metabolic panel (BMP + Alb, Alk Phos, ALT, AST, Total. Bili, TP)      9. Encounter for screening mammogram for breast cancer  Z12.31 MA Screen Bilateral w/Zev      Work on Healthy diet and exercise. Getting heart rate elevated for 30 mins most days of week.  2-4. medical conditions are stable. meds refilled.   5. Will stop hydrochlorothiazide for possible contributor to her gout flares.  Will also stop her lisinopril and switch to losartan to see if that helps her shortness of breath feelings.  Recheck blood pressure in 4 weeks.  Warning signs side effects were discussed.  6. Follow up  with OT.   7. Labs pending.         BMI  Estimated body mass index is  "31.89 kg/m  as calculated from the following:    Height as of this encounter: 1.6 m (5' 3\").    Weight as of this encounter: 81.6 kg (180 lb).   Weight management plan: Discussed healthy diet and exercise guidelines    Counseling  Appropriate preventive services were addressed with this patient via screening, questionnaire, or discussion as appropriate for fall prevention, nutrition, physical activity, Tobacco-use cessation, social engagement, weight loss and cognition.  Checklist reviewing preventive services available has been given to the patient.  Reviewed patient's diet, addressing concerns and/or questions.   She is at risk for lack of exercise and has been provided with information to increase physical activity for the benefit of her well-being.   The patient was provided with written information regarding signs of hearing loss.   Information on urinary incontinence and treatment options given to patient.   The patient's PHQ-9 score is consistent with mild depression. She was provided with information regarding depression.       Aroldo Barnes is a 73 year old, presenting for the following:  Physical        8/20/2024    12:48 PM   Additional Questions   Roomed by Shannan   Accompanied by self         8/20/2024    12:48 PM   Patient Reported Additional Medications   Patient reports taking the following new medications no         Health Care Directive  Patient has a Health Care Directive on file  Discussed advance care planning with patient.    HPI    Hyperlipidemia Follow-Up    Are you regularly taking any medication or supplement to lower your cholesterol?   Yes- crestor  Are you having muscle aches or other side effects that you think could be caused by your cholesterol lowering medication?  No      Hypertension Follow-up    Do you check your blood pressure regularly outside of the clinic? Yes   Are you following a low salt diet? Yes  Are your blood pressures ever more than 140 on the top number (systolic) " OR more   than 90 on the bottom number (diastolic), for example 140/90? No  She states her father had a complaint of shortness of breath was lisinopril and now she has developed that same complaint.  She is interested in switching to different medication to see if that helps her symptoms.  She denies any exertional symptoms.  Hypothyroidism Follow-up    Since last visit, patient describes the following symptoms: Weight stable, no hair loss, no skin changes, no constipation, no loose stools    She has a history of gout.  She is not an alcohol user.  She is unclear of what is causing her flareups.  She has had a couple over the last year.    Also has a history of carpal tunnel symptoms.  Now in both hands in the past has been on the right hand.  She has tried wrist braces which made her symptoms worse.  She is interested in trying physical therapy.      8/16/2024   General Health   How would you rate your overall physical health? Good   Feel stress (tense, anxious, or unable to sleep) To some extent      (!) STRESS CONCERN      8/16/2024   Nutrition   Diet: Regular (no restrictions)            8/16/2024   Exercise   Days per week of moderate/strenous exercise 2 days   Average minutes spent exercising at this level 40 min      (!) EXERCISE CONCERN      8/16/2024   Social Factors   Frequency of gathering with friends or relatives Once a week   Worry food won't last until get money to buy more No   Food not last or not have enough money for food? No   Do you have housing? (Housing is defined as stable permanent housing and does not include staying ouside in a car, in a tent, in an abandoned building, in an overnight shelter, or couch-surfing.) Yes   Are you worried about losing your housing? No   Lack of transportation? No   Unable to get utilities (heat,electricity)? No            8/20/2024   Fall Risk   Reason Gait Speed Test Not Completed Unable to complete test, patient reported symptoms               8/16/2024    Activities of Daily Living- Home Safety   Needs help with the following daily activites None of the above   Safety concerns in the home None of the above            2024   Dental   Dentist two times every year? Yes            2024   Hearing Screening   Hearing concerns? (!) IT'S HARD TO FOLLOW A CONVERSATION IN A NOISY RESTAURANT OR CROWDED ROOM.            2024   Driving Risk Screening   Patient/family members have concerns about driving No            2024   General Alertness/Fatigue Screening   Have you been more tired than usual lately? No            2024   Urinary Incontinence Screening   Bothered by leaking urine in past 6 months Yes            2024   TB Screening   Were you born outside of the [place]? No          Today's PHQ-9 Score:       2024    12:49 PM   PHQ-9 SCORE   PHQ-9 Total Score MyChart 5 (Mild depression)   PHQ-9 Total Score 5         2024   Substance Use   Alcohol more than 3/day or more than 7/wk No   Do you have a current opioid prescription? No   How severe/bad is pain from 1 to 10? 1/10   Do you use any other substances recreationally? No        Social History     Tobacco Use    Smoking status: Former     Current packs/day: 0.00     Types: Cigarettes     Start date: 1965     Quit date: 1986     Years since quittin.8    Smokeless tobacco: Former   Vaping Use    Vaping status: Never Used   Substance Use Topics    Alcohol use: Yes     Comment: Occasional    Drug use: No           2023   LAST FHS-7 RESULTS   1st degree relative breast or ovarian cancer Yes   Any relative bilateral breast cancer No   Any male have breast cancer No   Any ONE woman have BOTH breast AND ovarian cancer No   Any woman with breast cancer before 50yrs Yes   2 or more relatives with breast AND/OR ovarian cancer Yes   2 or more relatives with breast AND/OR bowel cancer Yes           Mammogram Screening - Mammogram every 1-2 years updated in Health Maintenance based  on mutual decision making    ASCVD Risk   The 10-year ASCVD risk score (Lawrence DOMINIQUE, et al., 2019) is: 14%    Values used to calculate the score:      Age: 73 years      Sex: Female      Is Non- : No      Diabetic: No      Tobacco smoker: No      Systolic Blood Pressure: 116 mmHg      Is BP treated: Yes      HDL Cholesterol: 47 mg/dL      Total Cholesterol: 163 mg/dL          Reviewed and updated as needed this visit by Provider   Tobacco  Allergies  Meds  Problems  Med Hx  Surg Hx  Fam Hx            Past Medical History:   Diagnosis Date    Actinic keratosis     AK (actinic keratosis) 10/10/2012    Anxiety state, unspecified     Depressive disorder     Heart disease, unspecified 6/26/2024    High triglycerides     HTN     Mild major depression (H24)     PFO (patent foramen ovale)     Unspecified hypothyroidism      Past Surgical History:   Procedure Laterality Date    APPENDECTOMY  01/1992    With hyst    BACK SURGERY  05/19/2020    COLONOSCOPY  9/24/2020    COSMETIC SURGERY  9/20/21    Liposuction    HYSTERECTOMY, SEGUNDO      LASIK      IA SPINE/LUMBAR DISK SURGERY  05/2020    TONSILLECTOMY & ADENOIDECTOMY       Lab work is in process  Labs reviewed in EPIC  BP Readings from Last 3 Encounters:   08/20/24 116/65   06/26/24 139/62   11/08/23 119/70    Wt Readings from Last 3 Encounters:   08/20/24 81.6 kg (180 lb)   06/26/24 80.7 kg (178 lb)   11/08/23 81.2 kg (179 lb)                  Patient Active Problem List   Diagnosis    High triglycerides    Anxiety state    CARDIOVASCULAR SCREENING; LDL GOAL LESS THAN 130    Hyperhidrosis of face    BMI 33.0-33.9,adult    AK (actinic keratosis)    Hypothyroidism, unspecified type    Benign essential hypertension    High blood cholesterol    Major depressive disorder, recurrent episode, mild (H24)    Seborrheic keratoses    Idiopathic anaphylactic reaction, subsequent encounter    Seasonal allergic rhinitis due to pollen    Vasomotor  rhinitis    Other conjunctivitis of both eyes    Chronic bilateral low back pain with left-sided sciatica    Hyperglycemia    Female stress incontinence    Lumbar disc herniation with radiculopathy    Heart disease, unspecified     Past Surgical History:   Procedure Laterality Date    APPENDECTOMY  1992    With hyst    BACK SURGERY  2020    COLONOSCOPY  2020    COSMETIC SURGERY  21    Liposuction    HYSTERECTOMY, SEGUNDO      LASIK      SC SPINE/LUMBAR DISK SURGERY  2020    TONSILLECTOMY & ADENOIDECTOMY         Social History     Tobacco Use    Smoking status: Former     Current packs/day: 0.00     Types: Cigarettes     Start date: 1965     Quit date: 1986     Years since quittin.8    Smokeless tobacco: Former   Substance Use Topics    Alcohol use: Yes     Comment: Occasional     Family History   Problem Relation Age of Onset    Breast Cancer Mother         Age 37 radical mastectomy    Heart Disease Mother     Lipids Mother     Osteoporosis Mother         Scoliosis    Hypertension Mother     C.A.D. Mother     Cancer Mother         melanoma on the leg    Hyperlipidemia Mother     Heart Disease Father     Lipids Father     Thyroid Disease Father     Hypertension Father     C.A.D. Father     Cardiovascular Father     Cancer Father         arm skin cancer    Respiratory Father         pulmonary fibrosis - on O2    Hyperlipidemia Father     Multiple Sclerosis Sister     Diabetes Brother     Parkinsonism Brother     Diabetes Brother         Type 1    Breast Cancer Maternal Grandmother         Lumpectomy approx age 70    Alzheimer Disease Maternal Grandmother     Diabetes Maternal Grandmother     Heart Disease Maternal Grandfather     Cancer - colorectal Paternal Grandmother     Heart Disease Paternal Grandfather     Lipids Son     Colon Cancer Son     Lipids Son     Colon Cancer Son         [facility] [place]46         Current Outpatient Medications   Medication Sig Dispense Refill    aspirin  (ASA) 325 MG tablet Take 325 mg by mouth      citalopram (CELEXA) 20 MG tablet Take 1 tablet (20 mg) by mouth daily 90 tablet 1    latanoprost (XALATAN) 0.005 % ophthalmic solution Place 1 drop into both eyes at bedtime      levothyroxine (SYNTHROID/LEVOTHROID) 75 MCG tablet Take 1 tablet (75 mcg) by mouth daily 90 tablet 1    losartan (COZAAR) 50 MG tablet Take 1 tablet (50 mg) by mouth daily 90 tablet 1    metoprolol succinate ER (TOPROL XL) 50 MG 24 hr tablet TAKE 1 TABLET DAILY 90 tablet 1    Multiple Vitamins-Minerals (MULTIVITAMIN ADULTS) TABS Take 1 tablet by mouth daily      RESTASIS 0.05 % ophthalmic emulsion       rosuvastatin (CRESTOR) 10 MG tablet Take 1 tablet (10 mg) by mouth daily 90 tablet 1     No Known Allergies  Current providers sharing in care for this patient include:  Patient Care Team:  Gil Dumas PA-C as PCP - General (Family Practice)  Gil Dumas PA-C as Assigned PCP    The following health maintenance items are reviewed in Epic and correct as of today:  Health Maintenance   Topic Date Due    ANNUAL REVIEW OF HM ORDERS  Never done    RSV VACCINE (Pregnancy & 60+) (1 - 1-dose 60+ series) Never done    COVID-19 Vaccine (3 - 2023-24 season) 09/01/2023    BMP  05/08/2024    INFLUENZA VACCINE (1) 09/01/2024    LIPID  11/08/2024    TSH W/FREE T4 REFLEX  11/08/2024    PHQ-9  02/20/2025    MEDICARE ANNUAL WELLNESS VISIT  08/20/2025    FALL RISK ASSESSMENT  08/20/2025    COLORECTAL CANCER SCREENING  10/06/2025    MAMMO SCREENING  11/16/2025    GLUCOSE  11/08/2026    ADVANCE CARE PLANNING  08/20/2029    DTAP/TDAP/TD IMMUNIZATION (3 - Td or Tdap) 10/26/2031    DEXA  10/26/2033    HEPATITIS C SCREENING  Completed    DEPRESSION ACTION PLAN  Completed    Pneumococcal Vaccine: 65+ Years  Completed    ZOSTER IMMUNIZATION  Completed    IPV IMMUNIZATION  Aged Out    HPV IMMUNIZATION  Aged Out    MENINGITIS IMMUNIZATION  Aged Out    RSV MONOCLONAL ANTIBODY  Aged Out         Review of  "Systems  Constitutional, HEENT, cardiovascular, pulmonary, gi and gu systems are negative, except as otherwise noted.     Objective    Exam  /65   Pulse 71   Temp 97  F (36.1  C) (Tympanic)   Resp 16   Ht 1.6 m (5' 3\")   Wt 81.6 kg (180 lb)   SpO2 96%   BMI 31.89 kg/m     Estimated body mass index is 31.89 kg/m  as calculated from the following:    Height as of this encounter: 1.6 m (5' 3\").    Weight as of this encounter: 81.6 kg (180 lb).    Physical Exam  GENERAL: alert and no distress  EYES: Eyes grossly normal to inspection, PERRL and conjunctivae and sclerae normal  HENT: ear canals and TM's normal, nose and mouth without ulcers or lesions  NECK: no adenopathy, no asymmetry, masses, or scars  RESP: lungs clear to auscultation - no rales, rhonchi or wheezes  CV: regular rate and rhythm, normal S1 S2, no S3 or S4, no murmur, click or rub, no peripheral edema  ABDOMEN: soft, nontender, no hepatosplenomegaly, no masses and bowel sounds normal  MS: no gross musculoskeletal defects noted, no edema  SKIN: no suspicious lesions or rashes  NEURO: Normal strength and tone, mentation intact and speech normal  PSYCH: mentation appears normal, affect normal/bright  Bilateral wrists : Full range of motion 5-5  strength.  Mildly positive Tinel's bilaterally.  She has subjective numbness in the right greater than left long fingers.          8/20/2024   Mini Cog   Mini-Cog Not Completed (choose reason) Patient declines                 Signed Electronically by: Gil Dumas PA-C    .undefined[^^  Answers submitted by the patient for this visit:  Patient Health Questionnaire (Submitted on 8/20/2024)  If you checked off any problems, how difficult have these problems made it for you to do your work, take care of things at home, or get along with other people?: Somewhat difficult  PHQ9 TOTAL SCORE: 5    "

## 2024-08-21 LAB
ALBUMIN SERPL BCG-MCNC: 4.6 G/DL (ref 3.5–5.2)
ALP SERPL-CCNC: 79 U/L (ref 40–150)
ALT SERPL W P-5'-P-CCNC: 21 U/L (ref 0–50)
ANION GAP SERPL CALCULATED.3IONS-SCNC: 14 MMOL/L (ref 7–15)
AST SERPL W P-5'-P-CCNC: 26 U/L (ref 0–45)
BILIRUB SERPL-MCNC: 0.4 MG/DL
BUN SERPL-MCNC: 12.2 MG/DL (ref 8–23)
CALCIUM SERPL-MCNC: 9.7 MG/DL (ref 8.8–10.4)
CHLORIDE SERPL-SCNC: 98 MMOL/L (ref 98–107)
CHOLEST SERPL-MCNC: 144 MG/DL
CREAT SERPL-MCNC: 0.76 MG/DL (ref 0.51–0.95)
EGFRCR SERPLBLD CKD-EPI 2021: 82 ML/MIN/1.73M2
FASTING STATUS PATIENT QL REPORTED: NO
FASTING STATUS PATIENT QL REPORTED: NO
GLUCOSE SERPL-MCNC: 86 MG/DL (ref 70–99)
HCO3 SERPL-SCNC: 24 MMOL/L (ref 22–29)
HDLC SERPL-MCNC: 42 MG/DL
LDLC SERPL CALC-MCNC: 62 MG/DL
NONHDLC SERPL-MCNC: 102 MG/DL
POTASSIUM SERPL-SCNC: 3.9 MMOL/L (ref 3.4–5.3)
PROT SERPL-MCNC: 6.9 G/DL (ref 6.4–8.3)
SODIUM SERPL-SCNC: 136 MMOL/L (ref 135–145)
TRIGL SERPL-MCNC: 201 MG/DL
TSH SERPL DL<=0.005 MIU/L-ACNC: 2.84 UIU/ML (ref 0.3–4.2)
URATE SERPL-MCNC: 6.9 MG/DL (ref 2.4–5.7)

## 2024-08-21 NOTE — RESULT ENCOUNTER NOTE
Ms. Ospina,    All of your labs were normal/near normal for you.  Your uric acid is a little elevated. We will continue to  monitor this in the future.     Please contact the clinic if you have additional questions.  Thank you.    Sincerely,    Gil Dumas PA-C

## 2024-08-27 ENCOUNTER — THERAPY VISIT (OUTPATIENT)
Dept: OCCUPATIONAL THERAPY | Facility: CLINIC | Age: 74
End: 2024-08-27
Attending: PHYSICIAN ASSISTANT
Payer: COMMERCIAL

## 2024-08-27 DIAGNOSIS — R20.2 PARESTHESIA OF BOTH HANDS: Primary | ICD-10-CM

## 2024-08-27 DIAGNOSIS — G56.03 BILATERAL CARPAL TUNNEL SYNDROME: ICD-10-CM

## 2024-08-27 PROCEDURE — 97110 THERAPEUTIC EXERCISES: CPT | Mod: GO | Performed by: OCCUPATIONAL THERAPIST

## 2024-08-27 PROCEDURE — 97165 OT EVAL LOW COMPLEX 30 MIN: CPT | Mod: GO | Performed by: OCCUPATIONAL THERAPIST

## 2024-08-27 PROCEDURE — 97112 NEUROMUSCULAR REEDUCATION: CPT | Mod: GO | Performed by: OCCUPATIONAL THERAPIST

## 2024-08-27 NOTE — PROGRESS NOTES
OCCUPATIONAL THERAPY EVALUATION  Type of Visit: Evaluation        Fall Risk Screen:  Fall screen completed by: OT  Have you fallen 2 or more times in the past year?: No  Have you fallen and had an injury in the past year?: No  Is patient a fall risk?: No    Subjective      Presenting condition or subjective complaint: Carpal tunnel bilateral  Date of onset: 08/20/24    Relevant medical history:   none per pt report  Dates & types of surgery: tonsils, hyst, appy, lasic, cataracts, liposuction    Prior diagnostic imaging/testing results:     none  Prior therapy history for the same diagnosis, illness or injury: No      Prior Level of Function  ADL: Independent    Living Environment  Social support: With a significant other or spouse   Type of home: House; 1 level   Stairs to enter the home: Yes 2 Is there a railing: No     Ramp: No   Stairs inside the home: Yes 12 Is there a railing: Yes     Help at home: None    Employment: No    Hobbies/Interests: Reading, crocheting, TV    Patient goals for therapy: Relieve symptoms    Pain assessment: Pain present  See objective evaluation for additional pain details     ADDITIONAL HISTORY:  Right hand dominant  Patient reports symptoms of pain, numbness, and tingling  of bilateral hands right > left for the past 6 months  Transportation: drives    Functional Outcome Measure:   Functional Outcome Measure:  Upper Extremity Functional Index  SCORE:   Column Totals: 74/80  (A lower score indicates greater disability.)    Pain Level (Scale 0-10)   8/27/2024   At Rest 0   With Use 1     Pain Description  Date 8/27/2024   Location wrists   Pain Quality Aching   Frequency intermittent     Pain is worst  daytime   Exacerbated by  driving   Relieved by Rest, shake hand out   Progression unchanged     Posture  Forward Neck Posture and Rounded Forward Shoulders    Edema  None    Sensation  Decreased Median Nerve distribution per pt report    ROM   WNL hands and wrists. Mild thernar atrophy  noted on exam of right hand     Cervical Screen  Pain Report:  - none    + mild    ++ moderate    +++ severe      Active with Gentle Overpressure 8/27/2024   Flexion -   Extension -   Lateral Flexion Right -   Lateral Flexion Left -   Rotation Right -   Rotation Left -   Compression + increase Sx   Traction -     Special Tests   - none    + mild    ++ moderate    +++ severe         Right Left   Norma Test NT NT   Elbow Flexion Test + med n Sx + med n Sx   Tinels Cubital Tunnel - -   Tinels [place] - -   Median Nerve Compression at Pronator - -   Carpal-Compression Test - -   Andujar test for lumbrical incursion  (fist x 30 secs) - -   Tinels at Carpal Tunnel ++ ++   Phalens + 30 secs + 30 secs     Neural Tension Testing  MNT: Median Neurodynamic Test (based on WING Chavez's ULNT)  0-5 Scale 8/27/2024   Right 4+/5   Left 4+/5   0/5: Arm across abdomen in coronal plane  1/5: Depress shoulder, ER to neutral Abd shoulder to 45 degrees  2/5: ER shoulder to end range, keep elbow at 90 degrees  3/5: Extend elbow to 0 degrees  4/5: Fully supinate forearm  5/5: Extend wrist, fingers and thumb  Notes:  (+) indicates beyond grade level but less than correction to next level  (-) indicates over correction to level  S1 onset/change of patient's symptoms  S2 definite stop point based on patient's discomfort level    Strength   (Measured in pounds)  Pain Report: - none  + mild    ++ moderate    +++ severe    8/27/2024 8/27/2024   Trials Right Left   1  2  3 42-  37-  32- 30-  31-  26-   Average 37 29     Lat Pinch 8/27/2024 8/27/2024   Trials Right Left   1  2  3 10  11  10 8  9  8   Average 10 8     3 Pt Pinch 8/27/2024 8/27/2024   Trials Right Left   1  2  3 7+  8+  7+ 9-  10-  9-   Average 7 9     Assessment & Plan   CLINICAL IMPRESSIONS  Medical Diagnosis: B CTS    Treatment Diagnosis: B hand pain and paraesthesias    Impression/Assessment: Pt is a 73 year old female presenting to Occupational Therapy due to bilateral CTS.   Patient's limitations or Problem List includes: Pain, Weakness, Sensory disturbance, Decreased , and Tightness in musculature of bilateral hands which interferes with the patient's ability to perform Work Tasks, Sleep Patterns, Recreational Activities, Household Chores, and Driving  as compared to previous level of function.    Clinical Decision Making (Complexity):  Assessment of Occupational Performance: 5 or more Performance Deficits  Occupational Performance Limitations: driving and community mobility, home establishment and management, meal preparation and cleanup, work, and leisure activities  Clinical Decision Making (Complexity): Low complexity    PLAN OF CARE  Treatment Interventions:  Therapeutic Exercise:  AROM, PROM, Tendon Gliding, and Isotonics  Neuromuscular re-education:  Nerve Gliding and Posture  Manual Techniques:  Myofascial release  Orthotic Fabrication:  Static Forearm based  Self Care:  Self Care Tasks and Ergonomic Considerations    Long Term Goals   OT Goal 1  Goal Identifier: driving  Goal Description: Pt will report decreased paraesthesia to be able to drive without difficulty  Rationale: In order to maximize safety and independence with ADL/IADLs  Goal Progress: mild difficulty  Target Date: 11/24/24      Frequency of Treatment: 2 x per month  Duration of Treatment: 3 months     Recommended Referrals to Other Professionals:  NA  Education Assessment: Learner/Method: Patient;Demonstration;Pictures/Video  Education Comments: printed PTRx     Risks and benefits of evaluation/treatment have been explained.   Patient/Family/caregiver agrees with Plan of Care.     Evaluation Time:    OT Eval, Low Complexity Minutes (65416): 20   Present: Not applicable     Signing Clinician: Brittany Chino OT        [facility]                                                                                   OUTPATIENT OCCUPATIONAL THERAPY      PLAN OF TREATMENT FOR  OUTPATIENT REHABILITATION   Patient's Last Name, First Name, Molly Tipton YOB: 1950   Provider's Name   [facility]   Medical Record No.  [id number]     Onset Date: 08/20/24 Start of Care Date: 08/27/24     Medical Diagnosis:  B CTS      OT Treatment Diagnosis:  B hand pain and paraesthesias Plan of Treatment  Frequency/Duration:2 x per month/3 months    Certification date from 08/27/24   To 11/24/24        See note for plan of treatment details and functional goals     Brittany Chino OT                         I CERTIFY THE NEED FOR THESE SERVICES FURNISHED UNDER        THIS PLAN OF TREATMENT AND WHILE UNDER MY CARE     (Physician attestation of this document indicates review and certification of the therapy plan).              Referring Provider:  Gil Dumas    Initial Assessment  See Epic Evaluation- 08/27/24

## 2024-08-28 ENCOUNTER — MYC MEDICAL ADVICE (OUTPATIENT)
Dept: FAMILY MEDICINE | Facility: CLINIC | Age: 74
End: 2024-08-28
Payer: MEDICARE

## 2024-08-29 NOTE — TELEPHONE ENCOUNTER
Routing to provider - Pt was seen in office 8/20/24. Please see mychart message from pt and advise.     Thank you - Lolis Thakur, BSN, RN

## 2024-09-07 ENCOUNTER — MYC MEDICAL ADVICE (OUTPATIENT)
Dept: FAMILY MEDICINE | Facility: CLINIC | Age: 74
End: 2024-09-07
Payer: MEDICARE

## 2024-09-19 ENCOUNTER — THERAPY VISIT (OUTPATIENT)
Dept: OCCUPATIONAL THERAPY | Facility: CLINIC | Age: 74
End: 2024-09-19
Payer: MEDICARE

## 2024-09-19 DIAGNOSIS — R20.2 PARESTHESIA OF BOTH HANDS: ICD-10-CM

## 2024-09-19 DIAGNOSIS — G56.03 BILATERAL CARPAL TUNNEL SYNDROME: Primary | ICD-10-CM

## 2024-09-19 PROCEDURE — 97112 NEUROMUSCULAR REEDUCATION: CPT | Mod: GO | Performed by: OCCUPATIONAL THERAPIST

## 2024-09-19 PROCEDURE — 97110 THERAPEUTIC EXERCISES: CPT | Mod: GO | Performed by: OCCUPATIONAL THERAPIST

## 2024-10-01 ENCOUNTER — OFFICE VISIT (OUTPATIENT)
Dept: FAMILY MEDICINE | Facility: CLINIC | Age: 74
End: 2024-10-01
Payer: COMMERCIAL

## 2024-10-01 VITALS
DIASTOLIC BLOOD PRESSURE: 66 MMHG | RESPIRATION RATE: 16 BRPM | WEIGHT: 181 LBS | OXYGEN SATURATION: 97 % | BODY MASS INDEX: 32.06 KG/M2 | TEMPERATURE: 96 F | SYSTOLIC BLOOD PRESSURE: 150 MMHG | HEART RATE: 84 BPM

## 2024-10-01 DIAGNOSIS — H11.31 CONJUNCTIVAL HEMORRHAGE OF RIGHT EYE: ICD-10-CM

## 2024-10-01 DIAGNOSIS — G56.03 BILATERAL CARPAL TUNNEL SYNDROME: ICD-10-CM

## 2024-10-01 DIAGNOSIS — I10 BENIGN ESSENTIAL HYPERTENSION: Primary | ICD-10-CM

## 2024-10-01 PROCEDURE — G2211 COMPLEX E/M VISIT ADD ON: HCPCS | Performed by: PHYSICIAN ASSISTANT

## 2024-10-01 PROCEDURE — 99213 OFFICE O/P EST LOW 20 MIN: CPT | Performed by: PHYSICIAN ASSISTANT

## 2024-10-01 RX ORDER — LOSARTAN POTASSIUM 100 MG/1
100 TABLET ORAL DAILY
Qty: 90 TABLET | Refills: 1 | Status: SHIPPED | OUTPATIENT
Start: 2024-10-01

## 2024-10-01 ASSESSMENT — PAIN SCALES - GENERAL: PAINLEVEL: NO PAIN (0)

## 2024-10-01 NOTE — PROGRESS NOTES
Assessment & Plan       ICD-10-CM    1. Benign essential hypertension  I10 losartan (COZAAR) 100 MG tablet      2. Bilateral carpal tunnel syndrome  G56.03 Orthopedic  Referral      3. Conjunctival hemorrhage of right eye  H11.31        1. Discussed changing losartan tablet to 100mg and increasing metoprolol to 100mg. Discussed metoprolol can decrease pulse. If patient experiences dizziness or does not tolerate this dose, patient is to call clinic. Patient is to continue monitoring BP at home.  2. Recommended she wear brace during the day as tolerated. Will refer to specialist.   3. Reassured not worrisome. Will likely resolve on its own with time.     Patient understands and agrees. If condition worsens or does not improve, patient is to call clinic or seek emergent care if needed. All questions answered. Follow up in     [facility]        Subjective   Molly is a 73 year old, presenting for the following health issues:  Hypertension    History of Present Illness       Hypertension: She presents for follow up of hypertension.  She does check blood pressure  regularly outside of the clinic. Outside blood pressures have been over 140/90. She follows a low salt diet.     She eats 2-3 servings of fruits and vegetables daily.She consumes 0 sweetened beverage(s) daily.She exercises with enough effort to increase her heart rate 30 to 60 minutes per day.  She exercises with enough effort to increase her heart rate 3 or less days per week.   She is taking medications regularly.     Patient reports she has been getting high BP readings at home, around 160s-170s/70s-80s. She takes her blood pressure often. No longer has shortness of breath since discontinuing lisinopril. Confirms she felt a bit bloated when she switched to losartan which has resolved. Denies headache, dizziness, chest pain, shortness of breath.     Patient reports a bloody right eye that began one week ago. Denies pain, trauma, or vision  loss. Wonders if this is related to blood pressure.     Patient reports years of carpal tunnel.  has been more bothersome in the last couple months. . She wears her braces at night but still wakes up with numbness in median nerve distribution. Right is worse than left.     Review of Systems  Constitutional, HEENT, cardiovascular, pulmonary, gi and gu systems are negative, except as otherwise noted.      Objective    BP (!) 150/66   Pulse 84   Temp (!) 96  F (35.6  C) (Tympanic)   Resp 16   Wt 82.1 kg (181 lb)   SpO2 97%   BMI 32.06 kg/m    Body mass index is 32.06 kg/m .  Physical Exam   GENERAL: alert and no distress  EYES: Right eye positive for discrete erythema consistent with conjunctival hemorrhage, PERRL. EOMI. Left eye unremarkable.   HENT: ear canals and TM's normal, nose and mouth without ulcers or lesions  NECK: no adenopathy, no asymmetry, masses, or scars  RESP: lungs clear to auscultation - no rales, rhonchi or wheezes  CV: regular rate and rhythm, normal S1 S2, no S3 or S4, no murmur, click or rub, no peripheral edema  MS: no gross musculoskeletal defects noted, no edema. Tinel's test positive for right side. Phalen's test for carpal tunnel positive bilaterally.     Labs reviewed.         Signed Electronically by: Gil Dumas PA-C

## 2024-10-01 NOTE — PATIENT INSTRUCTIONS
Metoprolol 100mg daily ( 2 of your 50mg pills together)   Send me home blood pressure readings through [facility] [facility].

## 2024-10-02 ENCOUNTER — PATIENT OUTREACH (OUTPATIENT)
Dept: CARE COORDINATION | Facility: CLINIC | Age: 74
End: 2024-10-02
Payer: MEDICARE

## 2024-10-02 NOTE — PROGRESS NOTES
SUBJECTIVE:  Molly Ospina is a 73 year old female who is seen in consultation at the request of Dr. Dumas, for Bilateral hand problems x 6 months.  Right > left   Symptoms: driving numbness/tingling .  Has numbness and tingling in ulnar 3  digits.  Right 3rd finger lacks sensation.   Awakens with numb bilateral hands when she uses wrist braces.  Before that no night numbness/tingling   Drops things  Can't feel things when picking them up     Treatment: night braces, OT no help. .   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     Past Medical History:   Diagnosis Date    Actinic keratosis     AK (actinic keratosis) 10/10/2012    Anxiety state, unspecified     Depressive disorder     Heart disease, unspecified 6/26/2024    High triglycerides     HTN     Mild major depression (H)     PFO (patent foramen ovale)     Unspecified hypothyroidism       Past Surgical History:   Procedure Laterality Date    APPENDECTOMY  01/1992    With hyst    BACK SURGERY  05/19/2020    COLONOSCOPY  9/24/2020    COSMETIC SURGERY  9/20/21    Liposuction    HYSTERECTOMY, SEGUNDO      LASIK      [facility] SPINE/LUMBAR DISK SURGERY  05/2020    TONSILLECTOMY & ADENOIDECTOMY          REVIEW OF SYSTEMS:  CONSTITUTIONAL:  NEGATIVE for fever, chills, change in weight  INTEGUMENTARY/SKIN:  NEGATIVE for worrisome rashes, moles or lesions  EYES:  NEGATIVE for vision changes or irritation  ENT/MOUTH:  NEGATIVE for ear, mouth and throat problems  RESP:  NEGATIVE for significant cough or SOB  BREAST:  NEGATIVE for masses, tenderness or discharge  CV:  NEGATIVE for chest pain, palpitations or peripheral edema  GI:  NEGATIVE for nausea, abdominal pain, heartburn, or change in bowel habits  :  Negative   MUSCULOSKELETAL:  See HPI above  NEURO:  See HPI above  ENDOCRINE:  NEGATIVE for temperature intolerance, skin/hair changes  HEME/ALLERGY/IMMUNE:  NEGATIVE for bleeding problems  PSYCHIATRIC:  NEGATIVE for changes in mood or affect    EXAM:  GENERAL  APPEARANCE: healthy, alert and no distress   GAIT: NORMAL  PSYCH:  mentation appears normal and affect normal/bright  SKIN: no suspicious lesions or rashes  NEURO: reflexes normal in upper extremities.   Vascular: Good capp refill and pulses  RESP: No increased work of breathing  LYMPH:  No lymphedema    MSK/Neuro:   strength: normal,   positive  thenar fasciculations. Bilateral   Thenar atrophy:Mild right only  Sensation diminished in  RIGHT radial 3 digits,   Pain radial wrists     Range of Motion wrist, digits: All Normal  Special tests: Tinel's positive, Phalen's positive.  Ulnar nerve motor tests intact. Negative froments negative cubital tunnel tinels    EMG: none.    ASSESSMENT/PLAN  Likely right > left  Carpal tunnel syndrome     We talked about the options, which included  EMG, activity modification, night splinting, therapy, corticosteroid injection, medrol dose pack, and carpal tunnel release.      Because her symptoms and signs are classic and concordant, We decided to proceed with .     Carpal tunnel release:  she would like right carpal tunnel release, local anesthetic.  We discussed the procedure of open carpal tunnel release. We discussed the risks, which include but are not limited to incisional tenderness/pillar pain, infection, minor or major neurovascular injury, tendon laceration, finger stiffness and failure to relieve symptoms.  We also discussed the alternatives, including nonsurgical options, and alternatives surgical options such as minimally invasive carpal tunnel release.  We discussed the pros and cons of open carpal tunnel release versus minimally invasive carpal tunnel release.        ELMO Smith MD  Dept. Orthopedic Surgery  [facility]

## 2024-10-03 ENCOUNTER — OFFICE VISIT (OUTPATIENT)
Dept: ORTHOPEDICS | Facility: CLINIC | Age: 74
End: 2024-10-03
Attending: PHYSICIAN ASSISTANT
Payer: MEDICARE

## 2024-10-03 ENCOUNTER — PREP FOR PROCEDURE (OUTPATIENT)
Dept: ORTHOPEDICS | Facility: CLINIC | Age: 74
End: 2024-10-03

## 2024-10-03 ENCOUNTER — MEDICAL CORRESPONDENCE (OUTPATIENT)
Dept: HEALTH INFORMATION MANAGEMENT | Facility: CLINIC | Age: 74
End: 2024-10-03

## 2024-10-03 ENCOUNTER — TELEPHONE (OUTPATIENT)
Dept: ORTHOPEDICS | Facility: CLINIC | Age: 74
End: 2024-10-03

## 2024-10-03 VITALS
HEART RATE: 80 BPM | OXYGEN SATURATION: 98 % | HEIGHT: 63 IN | WEIGHT: 181 LBS | BODY MASS INDEX: 32.07 KG/M2 | SYSTOLIC BLOOD PRESSURE: 136 MMHG | DIASTOLIC BLOOD PRESSURE: 77 MMHG

## 2024-10-03 DIAGNOSIS — G56.02 CARPAL TUNNEL SYNDROME OF LEFT WRIST: Primary | ICD-10-CM

## 2024-10-03 DIAGNOSIS — G56.01 CARPAL TUNNEL SYNDROME ON RIGHT: Primary | ICD-10-CM

## 2024-10-03 DIAGNOSIS — G56.03 BILATERAL CARPAL TUNNEL SYNDROME: ICD-10-CM

## 2024-10-03 DIAGNOSIS — G56.01 RIGHT CARPAL TUNNEL SYNDROME: Primary | ICD-10-CM

## 2024-10-03 PROBLEM — R20.2 PARESTHESIA OF BOTH HANDS: Status: RESOLVED | Noted: 2024-08-27 | Resolved: 2024-10-03

## 2024-10-03 PROCEDURE — 99203 OFFICE O/P NEW LOW 30 MIN: CPT | Performed by: ORTHOPAEDIC SURGERY

## 2024-10-03 NOTE — TELEPHONE ENCOUNTER
Type of surgery: RELEASE, RIGHT CARPAL TUNNEL   Location of surgery: [facility]  Date and time of surgery: 11/8  Surgeon: beti  Pre-Op Appt Date: not needed-local  Post-Op Appt Date: 11/8   Packet sent out: Yes  Pre-cert/Authorization completed:  Not Applicable  Date:

## 2024-10-03 NOTE — LETTER
10/3/2024      Molly Ospina  [address] [place] [address]      Dear Colleague,    Thank you for referring your patient, Molly Ospina, to the [facility]. Please see a copy of my visit note below.    SUBJECTIVE:  Molly Ospina is a 73 year old female who is seen in consultation at the request of Dr. Dumas, for Bilateral hand problems x 6 months.  Right > left   Symptoms: driving numbness/tingling .  Has numbness and tingling in ulnar 3  digits.  Right 3rd finger lacks sensation.   Awakens with numb bilateral hands when she uses wrist braces.  Before that no night numbness/tingling   Drops things  Can't feel things when picking them up     Treatment: night braces, OT no help. .   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     Past Medical History:   Diagnosis Date     Actinic keratosis      AK (actinic keratosis) 10/10/2012     Anxiety state, unspecified      Depressive disorder      Heart disease, unspecified 6/26/2024     High triglycerides      HTN      Mild major depression (H)      PFO (patent foramen ovale)      Unspecified hypothyroidism       Past Surgical History:   Procedure Laterality Date     APPENDECTOMY  01/1992    With hyst     BACK SURGERY  05/19/2020     COLONOSCOPY  9/24/2020     COSMETIC SURGERY  9/20/21    Liposuction     HYSTERECTOMY, SEGUNDO       LASIK       [facility] SPINE/LUMBAR DISK SURGERY  05/2020     TONSILLECTOMY & ADENOIDECTOMY          REVIEW OF SYSTEMS:  CONSTITUTIONAL:  NEGATIVE for fever, chills, change in weight  INTEGUMENTARY/SKIN:  NEGATIVE for worrisome rashes, moles or lesions  EYES:  NEGATIVE for vision changes or irritation  ENT/MOUTH:  NEGATIVE for ear, mouth and throat problems  RESP:  NEGATIVE for significant cough or SOB  BREAST:  NEGATIVE for masses, tenderness or discharge  CV:  NEGATIVE for chest pain, palpitations or peripheral edema  GI:  NEGATIVE for nausea, abdominal pain, heartburn, or change in bowel habits  :   Negative   MUSCULOSKELETAL:  See HPI above  NEURO:  See HPI above  ENDOCRINE:  NEGATIVE for temperature intolerance, skin/hair changes  HEME/ALLERGY/IMMUNE:  NEGATIVE for bleeding problems  PSYCHIATRIC:  NEGATIVE for changes in mood or affect    EXAM:  GENERAL APPEARANCE: healthy, alert and no distress   GAIT: NORMAL  PSYCH:  mentation appears normal and affect normal/bright  SKIN: no suspicious lesions or rashes  NEURO: reflexes normal in upper extremities.   Vascular: Good capp refill and pulses  RESP: No increased work of breathing  LYMPH:  No lymphedema    MSK/Neuro:   strength: normal,   positive  thenar fasciculations. Bilateral   Thenar atrophy:Mild right only  Sensation diminished in  RIGHT radial 3 digits,   Pain radial wrists     Range of Motion wrist, digits: All Normal  Special tests: Tinel's positive, Phalen's positive.  Ulnar nerve motor tests intact. Negative froments negative cubital tunnel tinels    EMG: none.    ASSESSMENT/PLAN  Likely right > left  Carpal tunnel syndrome     We talked about the options, which included  EMG, activity modification, night splinting, therapy, corticosteroid injection, medrol dose pack, and carpal tunnel release.      Because her symptoms and signs are classic and concordant, We decided to proceed with .     Carpal tunnel release:  she would like right carpal tunnel release, local anesthetic.  We discussed the procedure of open carpal tunnel release. We discussed the risks, which include but are not limited to incisional tenderness/pillar pain, infection, minor or major neurovascular injury, tendon laceration, finger stiffness and failure to relieve symptoms.  We also discussed the alternatives, including nonsurgical options, and alternatives surgical options such as minimally invasive carpal tunnel release.  We discussed the pros and cons of open carpal tunnel release versus minimally invasive carpal tunnel release.        ELMO Smith MD  Dept. Orthopedic  Surgery  [facility]      Again, thank you for allowing me to participate in the care of your patient.        Sincerely,        Zuhair Smith MD

## 2024-10-04 PROBLEM — G56.01 CARPAL TUNNEL SYNDROME ON RIGHT: Status: ACTIVE | Noted: 2024-10-03

## 2024-10-07 ENCOUNTER — MYC REFILL (OUTPATIENT)
Dept: FAMILY MEDICINE | Facility: CLINIC | Age: 74
End: 2024-10-07
Payer: MEDICARE

## 2024-10-07 DIAGNOSIS — I10 BENIGN ESSENTIAL HYPERTENSION: ICD-10-CM

## 2024-10-07 RX ORDER — LOSARTAN POTASSIUM 100 MG/1
100 TABLET ORAL DAILY
Qty: 90 TABLET | Refills: 1 | OUTPATIENT
Start: 2024-10-07

## 2024-10-08 ENCOUNTER — TELEPHONE (OUTPATIENT)
Dept: FAMILY MEDICINE | Facility: CLINIC | Age: 74
End: 2024-10-08
Payer: MEDICARE

## 2024-10-08 DIAGNOSIS — I10 BENIGN ESSENTIAL HYPERTENSION: ICD-10-CM

## 2024-10-08 NOTE — TELEPHONE ENCOUNTER
Patient calling in re: recent losartan script sent in - states [facility] will not dispense without speaking to provider's office. States she isn't sure why this is, but she only has 1 week left of medication.     Reviewed chart and noted provider wanted patient to increase Losartan to 100 mg daily and metoprolol to 100 mg daily, however Losartan script sig is a bit confusing:        With chart review and provider wanting patient to start 100 mg daily, sig likely not updated with new instructions and old sig accidentally still in new prescription.     Writer did reach out to pharmacy to clarify sig, pharmacist verbalized understanding. Aware to dispense the 100 mg 1 tab daily prescription. No further questions or concerns.      Routing as FYI to provider, may want to adjust sig in chart when able for accuracy. Thank you!      Ying Bah, RN, BSN  [facility]

## 2024-10-09 RX ORDER — LOSARTAN POTASSIUM 100 MG/1
100 TABLET ORAL DAILY
Qty: 90 TABLET | Refills: 1 | Status: SHIPPED | OUTPATIENT
Start: 2024-10-09

## 2024-10-09 RX ORDER — LATANOPROST 50 UG/ML
1 SOLUTION/ DROPS OPHTHALMIC AT BEDTIME
OUTPATIENT
Start: 2024-10-09

## 2024-10-15 ENCOUNTER — MYC MEDICAL ADVICE (OUTPATIENT)
Dept: FAMILY MEDICINE | Facility: CLINIC | Age: 74
End: 2024-10-15
Payer: MEDICARE

## 2024-10-15 DIAGNOSIS — I10 BENIGN ESSENTIAL HYPERTENSION: Primary | ICD-10-CM

## 2024-10-24 RX ORDER — AMLODIPINE BESYLATE 5 MG/1
5 TABLET ORAL DAILY
Qty: 30 TABLET | Refills: 0 | Status: SHIPPED | OUTPATIENT
Start: 2024-10-24 | End: 2024-11-06

## 2024-11-03 ENCOUNTER — MYC MEDICAL ADVICE (OUTPATIENT)
Dept: FAMILY MEDICINE | Facility: CLINIC | Age: 74
End: 2024-11-03
Payer: MEDICARE

## 2024-11-06 DIAGNOSIS — I10 BENIGN ESSENTIAL HYPERTENSION: ICD-10-CM

## 2024-11-06 RX ORDER — AMLODIPINE BESYLATE 5 MG/1
5 TABLET ORAL DAILY
Qty: 90 TABLET | Refills: 2 | Status: SHIPPED | OUTPATIENT
Start: 2024-11-06

## 2024-11-07 ENCOUNTER — MYC REFILL (OUTPATIENT)
Dept: FAMILY MEDICINE | Facility: CLINIC | Age: 74
End: 2024-11-07
Payer: MEDICARE

## 2024-11-07 DIAGNOSIS — I10 BENIGN ESSENTIAL HYPERTENSION: ICD-10-CM

## 2024-11-07 RX ORDER — METOPROLOL SUCCINATE 100 MG/1
100 TABLET, EXTENDED RELEASE ORAL DAILY
Qty: 90 TABLET | Refills: 1 | Status: SHIPPED | OUTPATIENT
Start: 2024-11-07

## 2024-11-07 RX ORDER — METOPROLOL SUCCINATE 50 MG/1
50 TABLET, EXTENDED RELEASE ORAL DAILY
Qty: 90 TABLET | Refills: 1 | Status: CANCELLED | OUTPATIENT
Start: 2024-11-07

## 2024-11-08 ENCOUNTER — HOSPITAL ENCOUNTER (OUTPATIENT)
Facility: AMBULATORY SURGERY CENTER | Age: 74
Discharge: HOME | End: 2024-11-08
Attending: ORTHOPAEDIC SURGERY | Admitting: ORTHOPAEDIC SURGERY
Payer: MEDICARE

## 2024-11-08 VITALS
DIASTOLIC BLOOD PRESSURE: 45 MMHG | SYSTOLIC BLOOD PRESSURE: 128 MMHG | HEART RATE: 65 BPM | RESPIRATION RATE: 16 BRPM | TEMPERATURE: 97.1 F | WEIGHT: 181 LBS | BODY MASS INDEX: 32.06 KG/M2 | OXYGEN SATURATION: 97 %

## 2024-11-08 DIAGNOSIS — Z98.890 S/P CARPAL TUNNEL RELEASE: Primary | ICD-10-CM

## 2024-11-08 PROCEDURE — 64721 CARPAL TUNNEL SURGERY: CPT

## 2024-11-08 PROCEDURE — G8918 PT W/O PREOP ORDER IV AB PRO: HCPCS

## 2024-11-08 PROCEDURE — G8907 PT DOC NO EVENTS ON DISCHARG: HCPCS

## 2024-11-08 PROCEDURE — 64721 CARPAL TUNNEL SURGERY: CPT | Mod: RT | Performed by: ORTHOPAEDIC SURGERY

## 2024-11-08 RX ORDER — AMOXICILLIN 250 MG
1-2 CAPSULE ORAL 2 TIMES DAILY
Qty: 30 TABLET | Refills: 0 | Status: SHIPPED | OUTPATIENT
Start: 2024-11-08

## 2024-11-08 RX ORDER — ACETAMINOPHEN 325 MG/1
650 TABLET ORAL
Status: DISCONTINUED | OUTPATIENT
Start: 2024-11-08 | End: 2024-11-09 | Stop reason: HOSPADM

## 2024-11-08 RX ORDER — CELECOXIB 200 MG/1
400 CAPSULE ORAL ONCE
Status: COMPLETED | OUTPATIENT
Start: 2024-11-08 | End: 2024-11-08

## 2024-11-08 RX ORDER — ACETAMINOPHEN 325 MG/1
975 TABLET ORAL ONCE
Status: COMPLETED | OUTPATIENT
Start: 2024-11-08 | End: 2024-11-08

## 2024-11-08 RX ORDER — HYDROCODONE BITARTRATE AND ACETAMINOPHEN 5; 325 MG/1; MG/1
1 TABLET ORAL
Status: DISCONTINUED | OUTPATIENT
Start: 2024-11-08 | End: 2024-11-09 | Stop reason: HOSPADM

## 2024-11-08 RX ORDER — HYDROCODONE BITARTRATE AND ACETAMINOPHEN 5; 325 MG/1; MG/1
1-2 TABLET ORAL EVERY 4 HOURS PRN
Qty: 10 TABLET | Refills: 0 | Status: SHIPPED | OUTPATIENT
Start: 2024-11-08

## 2024-11-08 RX ORDER — NAPROXEN 250 MG/1
250 TABLET ORAL EVERY 12 HOURS PRN
Qty: 10 TABLET | Refills: 0 | Status: SHIPPED | OUTPATIENT
Start: 2024-11-08

## 2024-11-08 RX ORDER — ACETAMINOPHEN 325 MG/1
650 TABLET ORAL EVERY 4 HOURS PRN
Qty: 50 TABLET | Refills: 0 | Status: SHIPPED | OUTPATIENT
Start: 2024-11-08

## 2024-11-08 RX ADMIN — CELECOXIB 400 MG: 200 CAPSULE ORAL at 09:29

## 2024-11-08 RX ADMIN — ACETAMINOPHEN 975 MG: 325 TABLET ORAL at 09:28

## 2024-11-08 NOTE — OP NOTE
OPERATIVE REPORT    DATE OF SERVICE:  November 8, 2024       PREOPERATIVE DIAGNOSIS  Right carpal tunnel syndrome.      POSTOPERATIVE DIAGNOSIS  Right carpal tunnel syndrome.      NAME OF OPERATION  Right carpal tunnel release.     SURGEON  Zuhair Smith MD     FIRST ASSISTANT  RODRIGO Musa.     ANESTHESIA: Local    ESTIMATED BLOOD LOSS: none    Complications: none    Specimen: none    INDICATIONS  The patient is a 73 year old female with right carpal tunnel syndrome based on clinical presentation. The symptoms have been quite bothersome.  Conservative treatment has failed.  Informed consent was obtained for the procedure.       PROCEDURE IN DETAIL  The patient was taken to the operating room and placed on the operating table in the supine position.  Tourniquet was placed around the proximal forearm. Pause for site confirmation performed. The area of the expected incision was injected with local anesthetic using a combination of 0.25% Marcaine and 1% lidocaine with epinephrine. The limb was prepped and draped in the usual sterile fashion. Pause for site confirmation was performed a 2nd time.       A longitudinal incision was made in the usual location at the base of the palm just ulnar to midline.  The incision was taken down through the skin and subcutaneous tissue carefully to the level of the palmar fascia.  The palmar fascia was then carefully incised, and the transverse carpal ligament was then exposed. The transverse carpal ligament was incised from distal to proximal under direct vision while protecting underlying structures with an elevator.  The TCL was quite tight and quite thick.  Proximally, we protected the underlying structures using a clamp, and then completed the ligament incision using a tenotomy scissors.  Once I felt that the transverse carpal ligament was released completely, we spread the clamp to make sure that it was in fact completely released.  The wound was irrigated.  Minor  bleeders were encountered and bovied.  Then, 4-0 nylon sutures placed in a vertical mattress fashion were used to close the skin, and a sterile dressing was applied followed by a volar splint. The patient was then transferred to the hospital cart and to the recovery area in stable condition.    ELMO Smith MD  Dept. Orthopedic Surgery  [facility]

## 2024-11-08 NOTE — DISCHARGE INSTRUCTIONS
Tylenol 975 mg was given at 9:30.  Your next dose is due at 3:30 PM  You should not take more then 4,000 mg of tylenol/acetaminophen in a 24 hour period.     Celebrex 400 mg given @ 9:30 am   Do not take NSAIDS until 9:30 PM

## 2024-11-21 ENCOUNTER — OFFICE VISIT (OUTPATIENT)
Dept: FAMILY MEDICINE | Facility: CLINIC | Age: 74
End: 2024-11-21
Payer: COMMERCIAL

## 2024-11-21 ENCOUNTER — OFFICE VISIT (OUTPATIENT)
Dept: ORTHOPEDICS | Facility: CLINIC | Age: 74
End: 2024-11-21
Payer: MEDICARE

## 2024-11-21 VITALS
OXYGEN SATURATION: 96 % | RESPIRATION RATE: 16 BRPM | DIASTOLIC BLOOD PRESSURE: 80 MMHG | WEIGHT: 183 LBS | TEMPERATURE: 96 F | BODY MASS INDEX: 32.42 KG/M2 | SYSTOLIC BLOOD PRESSURE: 144 MMHG | HEART RATE: 71 BPM

## 2024-11-21 VITALS — DIASTOLIC BLOOD PRESSURE: 71 MMHG | HEART RATE: 124 BPM | OXYGEN SATURATION: 98 % | SYSTOLIC BLOOD PRESSURE: 123 MMHG

## 2024-11-21 DIAGNOSIS — I10 BENIGN ESSENTIAL HYPERTENSION: ICD-10-CM

## 2024-11-21 DIAGNOSIS — I10 BENIGN ESSENTIAL HYPERTENSION: Primary | ICD-10-CM

## 2024-11-21 DIAGNOSIS — Z98.890 S/P CARPAL TUNNEL RELEASE: Primary | ICD-10-CM

## 2024-11-21 RX ORDER — AMLODIPINE BESYLATE 5 MG/1
5 TABLET ORAL DAILY
Qty: 90 TABLET | Refills: 1 | Status: SHIPPED | OUTPATIENT
Start: 2024-11-21

## 2024-11-21 ASSESSMENT — PAIN SCALES - GENERAL
PAINLEVEL_OUTOF10: NO PAIN (0)
PAINLEVEL_OUTOF10: NO PAIN (0)

## 2024-11-21 NOTE — PATIENT INSTRUCTIONS
Patient Education   Carpal Tunnel Release: What to Expect at Home  Your Recovery  Carpal tunnel reduces the pressure on a nerve in the wrist. Your doctor cut a ligament that presses on the nerve. This lets the nerve pass freely through the tunnel without being squeezed.  Your hand will hurt and may feel weak with some numbness. This usually goes away in a few days, but it may take several months. Your doctor may remove the large bandage, or the doctor will tell you when and how to remove it yourself. In some cases, you may have a splint. If you have one, you will wear it for about 2 weeks.  Your doctor will take out your stitches in 1 to 2 weeks. Your hand and wrist may feel worse than they used to feel. But the pain should start to go away. It usually takes 3 to 4 months to recover and up to 1 year before hand strength returns. How much strength returns will vary.  The timing of your return to work depends on the type of surgery you had, whether the surgery was on your dominant hand (the hand you use most), and your work activities.  If you had open surgery on your dominant hand and you do repeated actions at work, you may be able to go back to work in 6 to 8 weeks. Repeated motions include typing or assembly-line work. If the surgery was on the other hand and you don't do repeated actions at work, you may be able to return to work in 7 to 14 days.  If you had endoscopic surgery, you may be able to go back to work sooner than with open surgery.  This care sheet gives you a general idea about how long it will take for you to recover. But each person recovers at a different pace. Follow the steps below to get better as quickly as possible.  How can you care for yourself at home?  Activity    Rest when you feel tired. Getting enough sleep will help you recover.     Try to walk each day. Start by walking a little more than you did the day before. Bit by bit, increase the amount you walk.     For up to 2 weeks after  surgery, avoid lifting things heavier than 1 to 2 pounds and using your hand. This includes doing repeated arm or hand movements, such as typing or using a computer mouse, washing windows, vacuuming, or chopping food. Do not use power tools, and avoid activities that cause vibration.     You may do heavier tasks about 4 weeks after surgery. These include vacuuming, mowing the lawn, and gardening.     You may shower 24 to 48 hours after surgery, if your doctor okays it. Keep your bandage dry by taping a sheet of plastic to cover it. If you have a splint, keep it dry. Your doctor will tell you if you can remove it when you shower. Be careful not to put the splint on too tight. Do not take a bath until the incision heals, or until your doctor tells you it is okay.     You may drive when you are fully able to use your hand.   Diet    You can eat your normal diet. If your stomach is upset, try bland, low-fat foods like plain rice, broiled chicken, toast, and yogurt.   Medicines    Your doctor will tell you if and when you can restart your medicines. The doctor will also give you instructions about taking any new medicines.     If you stopped taking aspirin or some other blood thinner, your doctor will tell you when to start taking it again.     Take pain medicines exactly as directed.  If the doctor gave you a prescription medicine for pain, take it as prescribed.  If you are not taking a prescription pain medicine, take an over-the-counter medicine such as acetaminophen (Tylenol), ibuprofen (Advil, Motrin), or naproxen (Aleve). Read and follow all instructions on the label.  Do not take two or more pain medicines at the same time unless the doctor told you to. Many pain medicines have acetaminophen, which is Tylenol. Too much acetaminophen (Tylenol) can be harmful.     If you think your pain medicine is making you sick to your stomach:  Take your medicine after meals (unless your doctor has told you not to).  Ask your  doctor for a different pain medicine.     If your doctor prescribed antibiotics, take them as directed. Do not stop taking them just because you feel better. You need to take the full course of antibiotics.   Incision and splint care    Keep your bandage dry. If it gets dirty, you may change it.     If you have a splint, talk to your doctor about when you should wear it.   Exercise    You may need wrist and hand rehabilitation. This is a series of exercises you do after your surgery. This helps you get back your wrist's and hand's range of motion, strength, and . You will work with your doctor and physical or occupational therapist to plan this exercise program. To get the best results, you need to do the exercises correctly and as often and as long as your doctor tells you.   Ice and elevation    Put ice or a cold pack on your wrist for 10 to 20 minutes at a time. Try to do this every 1 to 2 hours for the next 3 days (when you are awake) or until the swelling goes down. Put a thin cloth between the ice and your skin.     Prop up the sore wrist on a pillow when you ice it or anytime you sit or lie down during the next 3 days. Try to keep it above the level of your heart. This will help reduce swelling.   Other instructions    Avoid letting your hand hang down. This can cause swelling.   Follow-up care is a key part of your treatment and safety. Be sure to make and go to all appointments, and call your doctor if you are having problems. It's also a good idea to know your test results and keep a list of the medicines you take.  When should you call for help?   Call 911 anytime you think you may need emergency care. For example, call if:    You passed out (lost consciousness).   Call your doctor now or seek immediate medical care if:    You have pain that does not get better after you take pain medicine.     Your hand is cool or pale or changes color.     You have a cast or splint and it feels too tight.     You  "have new or worse tingling, weakness, or numbness in your hand or fingers.     You are sick to your stomach or cannot drink fluids.     You have loose stitches, or your incision comes open.     You have signs of infection, such as:  Increased pain, swelling, warmth, or redness.  Red streaks leading from the incision.  Pus draining from the incision.  A fever.     Bright red blood has soaked through the bandage over your incision.   Watch closely for any changes in your health, and be sure to contact your doctor if:    You have a problem with your cast or splint.     You do not get better as expected.   Where can you learn more?  Go to https://www.[employer].net/patiented  Enter N388 in the search box to learn more about \"Carpal Tunnel Release: What to Expect at Home.\"  Current as of: July 17, 2023  Content Version: 14.2 2024 [facility] [employer].   Care instructions adapted under license by your healthcare professional. If you have questions about a medical condition or this instruction, always ask your healthcare professional. [employer] disclaims any warranty or liability for your use of this information.       " no known allergies

## 2024-11-21 NOTE — PROGRESS NOTES
Molly Ospina is here for follow up after right carpal tunnel release on 11/8.    Numbness and tingling have decreased since surgery, but still has some numbness/tingling in the 3rd finger..    Pain: mild.   She is much improved compared with preop.     Exam:  Wound looks good, no evidence of infection.  Tender over the incision site.  Able to make a full fist    Assessment:  Doing well status post right carpal tunnel release     Plan:  Sutures were removed today.  Taught scar management techniques.  Discussed splint wear.  Therapy: ordered  Can return to repetitive use at 4-6 weeks.  Return to clinic as needed       ELMO Smith MD  Dept. Orthopedic Surgery  [facility]

## 2024-11-21 NOTE — PROGRESS NOTES
"  Assessment & Plan       ICD-10-CM    1. Benign essential hypertension  I10           medical conditions are stable. meds refilled.  Work on Healthy diet and exercise. Getting heart rate elevated for 30 mins most days of week.  Recheck 6 months.      Subjective   Molly is a 74 year old, presenting for the following health issues:  Hypertension    History of Present Illness       Hypertension: She presents for follow up of hypertension.  She does check blood pressure  regularly outside of the clinic. Outside blood pressures have been over 140/90. She follows a low salt diet.     She eats 0-1 servings of fruits and vegetables daily.She consumes 0 sweetened beverage(s) daily.She exercises with enough effort to increase her heart rate 20 to 29 minutes per day.  She exercises with enough effort to increase her heart rate 3 or less days per week.   She is taking medications regularly.   No chest pain or short of breath. No headache or dizziness.     Hypertension Follow-up    Do you check your blood pressure regularly outside of the clinic? Yes   Are you following a low salt diet? No \"I like my potato chips\"  Are your blood pressures ever more than 140 on the top number (systolic) OR more   than 90 on the bottom number (diastolic), for example 140/90? Yes  History of gout with no recent flares.     Review of Systems  Constitutional, HEENT, cardiovascular, pulmonary, gi and gu systems are negative, except as otherwise noted.      Objective    BP (!) 144/80   Pulse 71   Temp (!) 96  F (35.6  C) (Tympanic)   Resp 16   Wt 83 kg (183 lb)   SpO2 96%   BMI 32.42 kg/m    Body mass index is 32.42 kg/m .  Physical Exam   GENERAL: alert and no distress  RESP: lungs clear to auscultation - no rales, rhonchi or wheezes  CV: regular rate and rhythm, normal S1 S2, no S3 or S4, no murmur, click or rub, no peripheral edema  MS: no gross musculoskeletal defects noted, no edema          Signed Electronically by: Gil Dumas, " SALAS

## 2024-11-21 NOTE — LETTER
11/21/2024      Molly Ospina  [address] [place] [address]      Dear Colleague,    Thank you for referring your patient, Molly Ospina, to the [facility]. Please see a copy of my visit note below.    Molly Ospina is here for follow up after right carpal tunnel release on 11/8.    Numbness and tingling have decreased since surgery, but still has some numbness/tingling in the 3rd finger..    Pain: mild.   She is much improved compared with preop.     Exam:  Wound looks good, no evidence of infection.  Tender over the incision site.  Able to make a full fist    Assessment:  Doing well status post right carpal tunnel release     Plan:  Sutures were removed today.  Taught scar management techniques.  Discussed splint wear.  Therapy: ordered  Can return to repetitive use at 4-6 weeks.  Return to clinic as needed       ELMO Smith MD  Dept. Orthopedic Surgery  [facility]       Again, thank you for allowing me to participate in the care of your patient.        Sincerely,        Zuhair Smith MD

## 2025-01-29 DIAGNOSIS — E03.9 HYPOTHYROIDISM, UNSPECIFIED TYPE: ICD-10-CM

## 2025-01-29 DIAGNOSIS — F33.0 MAJOR DEPRESSIVE DISORDER, RECURRENT EPISODE, MILD: ICD-10-CM

## 2025-01-29 DIAGNOSIS — E78.1 HIGH TRIGLYCERIDES: ICD-10-CM

## 2025-01-30 RX ORDER — CITALOPRAM HYDROBROMIDE 20 MG/1
20 TABLET ORAL DAILY
Qty: 90 TABLET | Refills: 1 | Status: SHIPPED | OUTPATIENT
Start: 2025-01-30

## 2025-01-30 RX ORDER — ROSUVASTATIN CALCIUM 10 MG/1
10 TABLET, COATED ORAL DAILY
Qty: 90 TABLET | Refills: 0 | Status: SHIPPED | OUTPATIENT
Start: 2025-01-30

## 2025-01-30 RX ORDER — LEVOTHYROXINE SODIUM 75 UG/1
75 TABLET ORAL DAILY
Qty: 90 TABLET | Refills: 1 | Status: SHIPPED | OUTPATIENT
Start: 2025-01-30

## 2025-03-18 ENCOUNTER — OFFICE VISIT (OUTPATIENT)
Dept: FAMILY MEDICINE | Facility: CLINIC | Age: 75
End: 2025-03-18
Payer: MEDICARE

## 2025-03-18 VITALS
BODY MASS INDEX: 32.24 KG/M2 | OXYGEN SATURATION: 98 % | HEART RATE: 77 BPM | SYSTOLIC BLOOD PRESSURE: 127 MMHG | WEIGHT: 182 LBS | RESPIRATION RATE: 16 BRPM | DIASTOLIC BLOOD PRESSURE: 68 MMHG | TEMPERATURE: 96 F

## 2025-03-18 DIAGNOSIS — I10 BENIGN ESSENTIAL HYPERTENSION: Primary | ICD-10-CM

## 2025-03-18 DIAGNOSIS — R60.9 EDEMA, UNSPECIFIED TYPE: ICD-10-CM

## 2025-03-18 PROCEDURE — 3074F SYST BP LT 130 MM HG: CPT | Performed by: PHYSICIAN ASSISTANT

## 2025-03-18 PROCEDURE — 36415 COLL VENOUS BLD VENIPUNCTURE: CPT | Performed by: PHYSICIAN ASSISTANT

## 2025-03-18 PROCEDURE — 3078F DIAST BP <80 MM HG: CPT | Performed by: PHYSICIAN ASSISTANT

## 2025-03-18 PROCEDURE — 1126F AMNT PAIN NOTED NONE PRSNT: CPT | Performed by: PHYSICIAN ASSISTANT

## 2025-03-18 PROCEDURE — G2211 COMPLEX E/M VISIT ADD ON: HCPCS | Performed by: PHYSICIAN ASSISTANT

## 2025-03-18 PROCEDURE — 99213 OFFICE O/P EST LOW 20 MIN: CPT | Performed by: PHYSICIAN ASSISTANT

## 2025-03-18 ASSESSMENT — PATIENT HEALTH QUESTIONNAIRE - PHQ9
SUM OF ALL RESPONSES TO PHQ QUESTIONS 1-9: 0
10. IF YOU CHECKED OFF ANY PROBLEMS, HOW DIFFICULT HAVE THESE PROBLEMS MADE IT FOR YOU TO DO YOUR WORK, TAKE CARE OF THINGS AT HOME, OR GET ALONG WITH OTHER PEOPLE: NOT DIFFICULT AT ALL
SUM OF ALL RESPONSES TO PHQ QUESTIONS 1-9: 0

## 2025-03-18 ASSESSMENT — PAIN SCALES - GENERAL: PAINLEVEL_OUTOF10: NO PAIN (0)

## 2025-03-18 NOTE — PROGRESS NOTES
"  Assessment & Plan       ICD-10-CM    1. Benign essential hypertension  I10 BASIC METABOLIC PANEL     BASIC METABOLIC PANEL      2. Edema, unspecified type  R60.9       Talk to patient about her concerns.  Labs are pending.  Follow-up depend on lab results.  She states the ankle swelling does not bother her enough that she wants to change her medications at this time.  We talked about diet and exercise.  We talked about low-salt diet.  We talked about compression stocking use and keeping active throughout the day.  Warning signs were discussed if they should occur we could consider stopping her amlodipine and trying a low-dose of hydrochlorothiazide.  If gout symptoms occur again then we would stop that.    BMI  Estimated body mass index is 32.24 kg/m  as calculated from the following:    Height as of 10/3/24: 1.6 m (5' 3\").    Weight as of this encounter: 82.6 kg (182 lb).   Weight management plan: Discussed healthy diet and exercise guidelines  The longitudinal plan of care for the diagnosis(es)/condition(s) as documented were addressed during this visit. Due to the added complexity in care, I will continue to support Molly in the subsequent management and with ongoing continuity of care.    Subjective   Molly is a 74 year old, presenting for the following health issues:  Hypertension        3/18/2025    10:35 AM   Additional Questions   Roomed by tae   Accompanied by self         3/18/2025    10:35 AM   Patient Reported Additional Medications   Patient reports taking the following new medications no     History of Present Illness       Reason for visit:  Swelling legs ? related to amlodipine?    She eats 2-3 servings of fruits and vegetables daily.She consumes 0 sweetened beverage(s) daily.She exercises with enough effort to increase her heart rate 20 to 29 minutes per day.  She exercises with enough effort to increase her heart rate 3 or less days per week.   She is taking medications regularly.    "       Hypertension Follow-up    Do you check your blood pressure regularly outside of the clinic? Yes   Are you following a low salt diet? Yes  Are your blood pressures ever more than 140 on the top number (systolic) OR more   than 90 on the bottom number (diastolic), for example 140/90? No  Patient is here for follow-up of hypertension.  She has recently been placed on amlodipine.  She had been switched from hydrochlorothiazide to amlodipine due to having gout flares.  She has not had a gout flare in many months.  She has gradually noticed some mild swelling around her ankles worse by the end of the day.  She denies any chest pain or shortness of breath or any headaches or dizziness.  She states the amlodipine is helping her blood pressure overall.    Review of Systems  Constitutional, HEENT, cardiovascular, pulmonary, gi and gu systems are negative, except as otherwise noted.      Objective    /68   Pulse 77   Temp (!) 96  F (35.6  C) (Tympanic)   Resp 16   Wt 82.6 kg (182 lb)   SpO2 98%   BMI 32.24 kg/m    Body mass index is 32.24 kg/m .  Physical Exam   GENERAL: alert and no distress  RESP: lungs clear to auscultation - no rales, rhonchi or wheezes  CV: regular rate and rhythm, normal S1 S2, no S3 or S4, no murmur, click or rub, no peripheral edema  MS: no gross musculoskeletal defects noted, mild bilateral ankle edema  SKIN: no suspicious lesions or rashes  NEURO: Normal strength and tone, mentation intact and speech normal  PSYCH: mentation appears normal, affect normal/bright    Labs pending        Signed Electronically by: Gil Dumas PA-C

## 2025-03-19 LAB
ANION GAP SERPL CALCULATED.3IONS-SCNC: 10 MMOL/L (ref 7–15)
BUN SERPL-MCNC: 11.9 MG/DL (ref 8–23)
CALCIUM SERPL-MCNC: 9.2 MG/DL (ref 8.8–10.4)
CHLORIDE SERPL-SCNC: 101 MMOL/L (ref 98–107)
CREAT SERPL-MCNC: 0.69 MG/DL (ref 0.51–0.95)
EGFRCR SERPLBLD CKD-EPI 2021: >90 ML/MIN/1.73M2
GLUCOSE SERPL-MCNC: 104 MG/DL (ref 70–99)
HCO3 SERPL-SCNC: 25 MMOL/L (ref 22–29)
POTASSIUM SERPL-SCNC: 4.1 MMOL/L (ref 3.4–5.3)
SODIUM SERPL-SCNC: 136 MMOL/L (ref 135–145)

## 2025-04-17 ENCOUNTER — ANCILLARY PROCEDURE (OUTPATIENT)
Dept: MAMMOGRAPHY | Facility: CLINIC | Age: 75
End: 2025-04-17
Attending: PHYSICIAN ASSISTANT
Payer: MEDICARE

## 2025-04-17 DIAGNOSIS — Z12.31 ENCOUNTER FOR SCREENING MAMMOGRAM FOR BREAST CANCER: ICD-10-CM

## 2025-05-20 ENCOUNTER — MYC MEDICAL ADVICE (OUTPATIENT)
Dept: FAMILY MEDICINE | Facility: CLINIC | Age: 75
End: 2025-05-20
Payer: MEDICARE

## 2025-05-21 ENCOUNTER — MYC MEDICAL ADVICE (OUTPATIENT)
Dept: FAMILY MEDICINE | Facility: CLINIC | Age: 75
End: 2025-05-21
Payer: MEDICARE

## 2025-05-30 DIAGNOSIS — I10 BENIGN ESSENTIAL HYPERTENSION: ICD-10-CM

## 2025-06-02 RX ORDER — LOSARTAN POTASSIUM 100 MG/1
100 TABLET ORAL DAILY
Qty: 90 TABLET | Refills: 2 | Status: SHIPPED | OUTPATIENT
Start: 2025-06-02

## 2025-06-26 ENCOUNTER — ANCILLARY PROCEDURE (OUTPATIENT)
Dept: GENERAL RADIOLOGY | Facility: CLINIC | Age: 75
End: 2025-06-26
Attending: PHYSICIAN ASSISTANT
Payer: COMMERCIAL

## 2025-06-26 ENCOUNTER — OFFICE VISIT (OUTPATIENT)
Dept: FAMILY MEDICINE | Facility: CLINIC | Age: 75
End: 2025-06-26
Payer: MEDICARE

## 2025-06-26 VITALS
TEMPERATURE: 98.4 F | RESPIRATION RATE: 16 BRPM | OXYGEN SATURATION: 95 % | BODY MASS INDEX: 32.59 KG/M2 | WEIGHT: 184 LBS | SYSTOLIC BLOOD PRESSURE: 86 MMHG | DIASTOLIC BLOOD PRESSURE: 49 MMHG | HEART RATE: 69 BPM

## 2025-06-26 DIAGNOSIS — R20.2 PARESTHESIA: ICD-10-CM

## 2025-06-26 DIAGNOSIS — E03.9 HYPOTHYROIDISM, UNSPECIFIED TYPE: ICD-10-CM

## 2025-06-26 DIAGNOSIS — R42 DIZZINESS: Primary | ICD-10-CM

## 2025-06-26 DIAGNOSIS — M54.2 NECK PAIN: ICD-10-CM

## 2025-06-26 DIAGNOSIS — I10 BENIGN ESSENTIAL HYPERTENSION: ICD-10-CM

## 2025-06-26 LAB
BASOPHILS # BLD AUTO: 0.1 10E3/UL (ref 0–0.2)
BASOPHILS NFR BLD AUTO: 1 %
EOSINOPHIL # BLD AUTO: 0.2 10E3/UL (ref 0–0.7)
EOSINOPHIL NFR BLD AUTO: 2 %
ERYTHROCYTE [DISTWIDTH] IN BLOOD BY AUTOMATED COUNT: 13 % (ref 10–15)
HCT VFR BLD AUTO: 41.5 % (ref 35–47)
HGB BLD-MCNC: 14 G/DL (ref 11.7–15.7)
IMM GRANULOCYTES # BLD: 0 10E3/UL
IMM GRANULOCYTES NFR BLD: 0 %
LYMPHOCYTES # BLD AUTO: 2 10E3/UL (ref 0.8–5.3)
LYMPHOCYTES NFR BLD AUTO: 18 %
MCH RBC QN AUTO: 32 PG (ref 26.5–33)
MCHC RBC AUTO-ENTMCNC: 33.7 G/DL (ref 31.5–36.5)
MCV RBC AUTO: 95 FL (ref 78–100)
MONOCYTES # BLD AUTO: 1 10E3/UL (ref 0–1.3)
MONOCYTES NFR BLD AUTO: 9 %
NEUTROPHILS # BLD AUTO: 7.7 10E3/UL (ref 1.6–8.3)
NEUTROPHILS NFR BLD AUTO: 70 %
PLATELET # BLD AUTO: 299 10E3/UL (ref 150–450)
RBC # BLD AUTO: 4.38 10E6/UL (ref 3.8–5.2)
WBC # BLD AUTO: 11 10E3/UL (ref 4–11)

## 2025-06-26 ASSESSMENT — PAIN SCALES - GENERAL: PAINLEVEL_OUTOF10: NO PAIN (0)
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
## 2025-06-30 ENCOUNTER — RESULTS FOLLOW-UP (OUTPATIENT)
Dept: FAMILY MEDICINE | Facility: CLINIC | Age: 75
End: 2025-06-30

## 2025-07-02 ENCOUNTER — MYC MEDICAL ADVICE (OUTPATIENT)
Dept: FAMILY MEDICINE | Facility: CLINIC | Age: 75
End: 2025-07-02
Payer: MEDICARE

## 2025-07-21 ENCOUNTER — PATIENT OUTREACH (OUTPATIENT)
Dept: CARE COORDINATION | Facility: CLINIC | Age: 75
End: 2025-07-21
Payer: MEDICARE

## 2025-07-22 ENCOUNTER — OFFICE VISIT (OUTPATIENT)
Dept: FAMILY MEDICINE | Facility: CLINIC | Age: 75
End: 2025-07-22
Payer: MEDICARE

## 2025-07-22 VITALS
DIASTOLIC BLOOD PRESSURE: 70 MMHG | WEIGHT: 182 LBS | OXYGEN SATURATION: 97 % | BODY MASS INDEX: 32.24 KG/M2 | HEART RATE: 67 BPM | SYSTOLIC BLOOD PRESSURE: 138 MMHG | TEMPERATURE: 98.3 F | RESPIRATION RATE: 16 BRPM

## 2025-07-22 DIAGNOSIS — F33.0 MAJOR DEPRESSIVE DISORDER, RECURRENT EPISODE, MILD: ICD-10-CM

## 2025-07-22 DIAGNOSIS — E03.9 HYPOTHYROIDISM, UNSPECIFIED TYPE: ICD-10-CM

## 2025-07-22 DIAGNOSIS — I10 BENIGN ESSENTIAL HYPERTENSION: Primary | ICD-10-CM

## 2025-07-22 PROCEDURE — G2211 COMPLEX E/M VISIT ADD ON: HCPCS | Performed by: PHYSICIAN ASSISTANT

## 2025-07-22 PROCEDURE — 99213 OFFICE O/P EST LOW 20 MIN: CPT | Performed by: PHYSICIAN ASSISTANT

## 2025-07-22 PROCEDURE — 3078F DIAST BP <80 MM HG: CPT | Performed by: PHYSICIAN ASSISTANT

## 2025-07-22 PROCEDURE — 3075F SYST BP GE 130 - 139MM HG: CPT | Performed by: PHYSICIAN ASSISTANT

## 2025-07-22 PROCEDURE — 1126F AMNT PAIN NOTED NONE PRSNT: CPT | Performed by: PHYSICIAN ASSISTANT

## 2025-07-22 RX ORDER — CITALOPRAM HYDROBROMIDE 20 MG/1
20 TABLET ORAL DAILY
Qty: 90 TABLET | Refills: 1 | Status: SHIPPED | OUTPATIENT
Start: 2025-07-22

## 2025-07-22 RX ORDER — LEVOTHYROXINE SODIUM 75 UG/1
75 TABLET ORAL DAILY
Qty: 90 TABLET | Refills: 1 | Status: SHIPPED | OUTPATIENT
Start: 2025-07-22

## 2025-07-22 ASSESSMENT — PAIN SCALES - GENERAL: PAINLEVEL_OUTOF10: NO PAIN (0)

## 2025-07-22 NOTE — PROGRESS NOTES
Assessment & Plan       ICD-10-CM    1. Benign essential hypertension  I10       2. Major depressive disorder, recurrent episode, mild  F33.0 citalopram (CELEXA) 20 MG tablet      3. Hypothyroidism, unspecified type  E03.9 levothyroxine (SYNTHROID/LEVOTHROID) 75 MCG tablet      medical conditions are stable. meds refilled.  Work on Healthy diet and exercise. Getting heart rate elevated for 30 mins most days of week.  Recheck 6 months.       Subjective   Molly is a 74 year old with hypertension, depression, hypothyroid, presenting for the following health issues:  Hypertension        7/22/2025    11:01 AM   Additional Questions   Roomed by tae   Accompanied by self         7/22/2025    11:01 AM   Patient Reported Additional Medications   Patient reports taking the following new medications no     History of Present Illness       Hypertension: She presents for follow up of hypertension.  She does check blood pressure  regularly outside of the clinic. Outside blood pressures have been over 140/90. She follows a low salt diet.     She eats 2-3 servings of fruits and vegetables daily.She consumes 0 sweetened beverage(s) daily.She exercises with enough effort to increase her heart rate 20 to 29 minutes per day.  She exercises with enough effort to increase her heart rate 3 or less days per week.   She is taking medications regularly.      Patient is here for follow-up of stopping her amlodipine due to orthostatic hypotension causing dizziness.  She states she is doing much better minimal dizzy spells.  She has noticed that the swelling around her ankles have subsided also.  She has a chest pain or shortness of breath.    She is due for refills for her depression she states that the Celexa is working well she has no concerns and not wanting to make any adjustments.  She is on levothyroxine for hypothyroid.  Her last labs showed a slight elevation in her TSH she is asymptomatic.  Her following lab test have been in the  normal range.        Review of Systems  Constitutional, HEENT, cardiovascular, pulmonary, gi and gu systems are negative, except as otherwise noted.      Objective    /70   Pulse 67   Temp 98.3  F (36.8  C) (Tympanic)   Resp 16   Wt 82.6 kg (182 lb)   SpO2 97%   BMI 32.24 kg/m    Body mass index is 32.24 kg/m .  Physical Exam   GENERAL: alert and no distress  NECK: no adenopathy, no asymmetry, masses, or scars  RESP: lungs clear to auscultation - no rales, rhonchi or wheezes  CV: regular rate and rhythm, normal S1 S2, no S3 or S4, no murmur, click or rub, no peripheral edema  ABDOMEN: soft, nontender, no hepatosplenomegaly, no masses and bowel sounds normal  MS: no gross musculoskeletal defects noted, no edema    Labs reviewd        Signed Electronically by: Gil Dumas PA-C

## 2025-07-24 ENCOUNTER — PATIENT OUTREACH (OUTPATIENT)
Dept: CARE COORDINATION | Facility: CLINIC | Age: 75
End: 2025-07-24
Payer: MEDICARE

## 2025-08-04 ENCOUNTER — PATIENT OUTREACH (OUTPATIENT)
Dept: CARE COORDINATION | Facility: CLINIC | Age: 75
End: 2025-08-04
Payer: MEDICARE

## 2025-08-07 ENCOUNTER — OFFICE VISIT (OUTPATIENT)
Dept: PHYSICAL MEDICINE AND REHAB | Facility: CLINIC | Age: 75
End: 2025-08-07
Attending: PHYSICIAN ASSISTANT
Payer: COMMERCIAL

## 2025-08-07 VITALS
DIASTOLIC BLOOD PRESSURE: 65 MMHG | WEIGHT: 182 LBS | HEART RATE: 68 BPM | BODY MASS INDEX: 32.25 KG/M2 | SYSTOLIC BLOOD PRESSURE: 174 MMHG | HEIGHT: 63 IN

## 2025-08-07 DIAGNOSIS — M54.12 LEFT CERVICAL RADICULOPATHY: Primary | ICD-10-CM

## 2025-08-07 DIAGNOSIS — M48.02 CERVICAL STENOSIS OF SPINAL CANAL: ICD-10-CM

## 2025-08-07 DIAGNOSIS — M50.30 DDD (DEGENERATIVE DISC DISEASE), CERVICAL: ICD-10-CM

## 2025-08-07 DIAGNOSIS — M48.02 FORAMINAL STENOSIS OF CERVICAL REGION: ICD-10-CM

## 2025-08-07 DIAGNOSIS — M43.12 SPONDYLOLISTHESIS, CERVICAL REGION: ICD-10-CM

## 2025-08-07 RX ORDER — GABAPENTIN 100 MG/1
100 CAPSULE ORAL 3 TIMES DAILY
Qty: 90 CAPSULE | Refills: 3 | Status: SHIPPED | OUTPATIENT
Start: 2025-08-07

## 2025-08-07 ASSESSMENT — PAIN SCALES - GENERAL: PAINLEVEL_OUTOF10: MILD PAIN (2)

## 2025-08-23 ENCOUNTER — THERAPY VISIT (OUTPATIENT)
Dept: PHYSICAL THERAPY | Facility: CLINIC | Age: 75
End: 2025-08-23
Attending: NURSE PRACTITIONER
Payer: MEDICARE

## 2025-08-23 DIAGNOSIS — M48.02 FORAMINAL STENOSIS OF CERVICAL REGION: ICD-10-CM

## 2025-08-23 DIAGNOSIS — M54.12 LEFT CERVICAL RADICULOPATHY: ICD-10-CM

## 2025-08-23 DIAGNOSIS — M48.02 CERVICAL STENOSIS OF SPINAL CANAL: ICD-10-CM

## 2025-08-23 DIAGNOSIS — M50.30 DDD (DEGENERATIVE DISC DISEASE), CERVICAL: ICD-10-CM

## 2025-08-23 DIAGNOSIS — M43.12 SPONDYLOLISTHESIS, CERVICAL REGION: ICD-10-CM

## 2025-08-23 PROCEDURE — 97161 PT EVAL LOW COMPLEX 20 MIN: CPT | Mod: GP | Performed by: PHYSICAL THERAPIST

## 2025-08-23 PROCEDURE — 97110 THERAPEUTIC EXERCISES: CPT | Mod: GP | Performed by: PHYSICAL THERAPIST

## 2025-08-28 DIAGNOSIS — M54.12 LEFT CERVICAL RADICULOPATHY: ICD-10-CM

## 2025-09-02 RX ORDER — GABAPENTIN 100 MG/1
100 CAPSULE ORAL 3 TIMES DAILY
Qty: 90 CAPSULE | Refills: 4 | Status: SHIPPED | OUTPATIENT
Start: 2025-09-02

## (undated) DEVICE — SU ETHILON 4-0 FS-2 18" 662H

## (undated) DEVICE — ESU ELEC NDL 1" COATED/INSULATED E1465

## (undated) DEVICE — CAST PLASTER SPLINT 4X15" 7394

## (undated) DEVICE — CAST PADDING 4" UNSTERILE 9044

## (undated) DEVICE — GLOVE BIOGEL PI ULTRATOUCH G SZ 7.5 42175

## (undated) DEVICE — GLOVE BIOGEL PI ULTRATOUCH G SZ 8.0 42180

## (undated) DEVICE — BNDG ELASTIC 3"X5YDS UNSTERILE 6611-30

## (undated) DEVICE — PREP CHLORAPREP 26ML TINTED ORANGE  260815

## (undated) DEVICE — DRAPE STERI TOWEL SM 1000

## (undated) DEVICE — NDL 19GA 1.5"

## (undated) DEVICE — PREP POVIDONE IODINE SWABS X3

## (undated) DEVICE — SOL WATER IRRIG 1000ML BOTTLE 07139-09

## (undated) DEVICE — PACK HAND WRIST SOP15HWFSP

## (undated) DEVICE — SOL PRP PVP IOD .75OZ PCH PKT CNTNR STRL DYNDA2232A

## (undated) DEVICE — GLOVE BIOGEL PI MICRO INDICATOR UNDERGLOVE SZ 7.5 48975

## (undated) RX ORDER — ACETAMINOPHEN 325 MG/1
TABLET ORAL
Status: DISPENSED
Start: 2024-11-08

## (undated) RX ORDER — LIDOCAINE HYDROCHLORIDE AND EPINEPHRINE 10; 10 MG/ML; UG/ML
INJECTION, SOLUTION INFILTRATION; PERINEURAL
Status: DISPENSED
Start: 2024-11-08

## (undated) RX ORDER — BUPIVACAINE HYDROCHLORIDE 2.5 MG/ML
INJECTION, SOLUTION EPIDURAL; INFILTRATION; INTRACAUDAL
Status: DISPENSED
Start: 2024-11-08

## (undated) RX ORDER — CELECOXIB 200 MG/1
CAPSULE ORAL
Status: DISPENSED
Start: 2024-11-08